# Patient Record
Sex: FEMALE | Race: WHITE | NOT HISPANIC OR LATINO | Employment: OTHER | URBAN - METROPOLITAN AREA
[De-identification: names, ages, dates, MRNs, and addresses within clinical notes are randomized per-mention and may not be internally consistent; named-entity substitution may affect disease eponyms.]

---

## 2017-01-10 ENCOUNTER — APPOINTMENT (OUTPATIENT)
Dept: OTHER | Facility: IMAGING CENTER | Age: 77
End: 2017-01-10

## 2017-01-10 PROCEDURE — 97530 THERAPEUTIC ACTIVITIES: CPT | Performed by: FAMILY MEDICINE

## 2017-01-25 ENCOUNTER — APPOINTMENT (OUTPATIENT)
Dept: OTHER | Facility: IMAGING CENTER | Age: 77
End: 2017-01-25

## 2017-01-25 PROCEDURE — 97530 THERAPEUTIC ACTIVITIES: CPT | Performed by: FAMILY MEDICINE

## 2017-02-16 ENCOUNTER — APPOINTMENT (OUTPATIENT)
Dept: OTHER | Facility: IMAGING CENTER | Age: 77
End: 2017-02-16

## 2017-02-16 PROCEDURE — 97530 THERAPEUTIC ACTIVITIES: CPT | Performed by: FAMILY MEDICINE

## 2017-03-03 ENCOUNTER — APPOINTMENT (OUTPATIENT)
Dept: OTHER | Facility: IMAGING CENTER | Age: 77
End: 2017-03-03

## 2017-03-03 PROCEDURE — 97530 THERAPEUTIC ACTIVITIES: CPT | Performed by: FAMILY MEDICINE

## 2017-03-17 ENCOUNTER — APPOINTMENT (OUTPATIENT)
Dept: OTHER | Facility: IMAGING CENTER | Age: 77
End: 2017-03-17

## 2017-03-17 PROCEDURE — 97530 THERAPEUTIC ACTIVITIES: CPT | Performed by: FAMILY MEDICINE

## 2017-08-18 ENCOUNTER — HOSPITAL ENCOUNTER (OUTPATIENT)
Dept: LAB | Facility: MEDICAL CENTER | Age: 77
End: 2017-08-18
Attending: INTERNAL MEDICINE
Payer: MEDICARE

## 2017-08-18 LAB
C3 SERPL-MCNC: 108 MG/DL (ref 87–200)
C4 SERPL-MCNC: 31 MG/DL (ref 19–52)

## 2017-08-18 PROCEDURE — 86160 COMPLEMENT ANTIGEN: CPT

## 2017-08-18 PROCEDURE — 86161 COMPLEMENT/FUNCTION ACTIVITY: CPT

## 2017-08-18 PROCEDURE — 86160 COMPLEMENT ANTIGEN: CPT | Mod: 91

## 2017-08-18 PROCEDURE — 36415 COLL VENOUS BLD VENIPUNCTURE: CPT

## 2017-08-20 LAB
C1INH FUNCTIONAL/C1INH TOTAL MFR SERPL: 99 %
C1INH SERPL-MCNC: 27 MG/DL (ref 21–39)

## 2017-08-28 LAB
MISCELLANEOUS LAB RESULT MISCLAB: NORMAL
MISCELLANEOUS LAB RESULT MISCLAB: NORMAL

## 2017-09-14 ENCOUNTER — APPOINTMENT (OUTPATIENT)
Dept: OTHER | Facility: IMAGING CENTER | Age: 77
End: 2017-09-14

## 2017-09-14 PROCEDURE — 97530 THERAPEUTIC ACTIVITIES: CPT | Performed by: FAMILY MEDICINE

## 2017-09-19 ENCOUNTER — APPOINTMENT (OUTPATIENT)
Dept: OTHER | Facility: IMAGING CENTER | Age: 77
End: 2017-09-19

## 2017-09-19 PROCEDURE — 97530 THERAPEUTIC ACTIVITIES: CPT | Performed by: FAMILY MEDICINE

## 2017-10-03 ENCOUNTER — APPOINTMENT (OUTPATIENT)
Dept: OTHER | Facility: IMAGING CENTER | Age: 77
End: 2017-10-03

## 2017-10-03 PROCEDURE — 97530 THERAPEUTIC ACTIVITIES: CPT | Performed by: FAMILY MEDICINE

## 2017-10-26 ENCOUNTER — APPOINTMENT (OUTPATIENT)
Dept: OTHER | Facility: IMAGING CENTER | Age: 77
End: 2017-10-26

## 2017-10-26 PROCEDURE — 97530 THERAPEUTIC ACTIVITIES: CPT | Performed by: FAMILY MEDICINE

## 2017-11-09 ENCOUNTER — APPOINTMENT (OUTPATIENT)
Dept: OTHER | Facility: IMAGING CENTER | Age: 77
End: 2017-11-09

## 2017-11-09 PROCEDURE — 97530 THERAPEUTIC ACTIVITIES: CPT | Performed by: FAMILY MEDICINE

## 2017-11-28 ENCOUNTER — APPOINTMENT (OUTPATIENT)
Dept: OTHER | Facility: IMAGING CENTER | Age: 77
End: 2017-11-28

## 2017-11-28 PROCEDURE — 97530 THERAPEUTIC ACTIVITIES: CPT | Performed by: FAMILY MEDICINE

## 2018-02-02 ENCOUNTER — APPOINTMENT (OUTPATIENT)
Dept: OTHER | Facility: IMAGING CENTER | Age: 78
End: 2018-02-02

## 2018-02-09 ENCOUNTER — APPOINTMENT (OUTPATIENT)
Dept: OTHER | Facility: IMAGING CENTER | Age: 78
End: 2018-02-09

## 2018-02-21 ENCOUNTER — APPOINTMENT (OUTPATIENT)
Dept: OTHER | Facility: IMAGING CENTER | Age: 78
End: 2018-02-21

## 2018-03-27 ENCOUNTER — APPOINTMENT (OUTPATIENT)
Dept: OTHER | Facility: IMAGING CENTER | Age: 78
End: 2018-03-27

## 2018-04-04 ENCOUNTER — APPOINTMENT (OUTPATIENT)
Dept: OTHER | Facility: IMAGING CENTER | Age: 78
End: 2018-04-04

## 2018-04-23 ENCOUNTER — APPOINTMENT (OUTPATIENT)
Dept: OTHER | Facility: IMAGING CENTER | Age: 78
End: 2018-04-23

## 2018-05-07 ENCOUNTER — APPOINTMENT (OUTPATIENT)
Dept: OTHER | Facility: IMAGING CENTER | Age: 78
End: 2018-05-07

## 2018-10-04 ENCOUNTER — APPOINTMENT (OUTPATIENT)
Dept: OTHER | Facility: IMAGING CENTER | Age: 78
End: 2018-10-04

## 2018-10-09 ENCOUNTER — HOSPITAL ENCOUNTER (OUTPATIENT)
Dept: LAB | Facility: MEDICAL CENTER | Age: 78
End: 2018-10-09
Attending: ORTHOPAEDIC SURGERY
Payer: MEDICARE

## 2018-11-28 ENCOUNTER — HOSPITAL ENCOUNTER (OUTPATIENT)
Facility: MEDICAL CENTER | Age: 78
End: 2018-11-28
Attending: ORTHOPAEDIC SURGERY
Payer: MEDICARE

## 2018-11-28 DIAGNOSIS — E83.49 OTHER DISORDERS OF MAGNESIUM METABOLISM: ICD-10-CM

## 2018-11-28 DIAGNOSIS — M81.0 OSTEOPOROSIS, POST-MENOPAUSAL: ICD-10-CM

## 2018-11-28 DIAGNOSIS — Z51.81 MEDICATION MONITORING ENCOUNTER: ICD-10-CM

## 2018-11-28 PROCEDURE — 83735 ASSAY OF MAGNESIUM: CPT

## 2018-11-28 PROCEDURE — 82340 ASSAY OF CALCIUM IN URINE: CPT

## 2018-11-30 LAB
CALCIUM 24H UR-MCNC: 9.3 MG/DL
CALCIUM 24H UR-MRATE: 184 MG/D
CALCIUM/CREAT 24H UR: 266 MG/G (ref 20–300)
COLLECT DURATION TIME SPEC: 24 HRS
CREAT 24H UR-MCNC: 35 MG/DL
CREAT 24H UR-MRATE: 691 MG/D (ref 500–1400)
MAGNESIUM 24H UR-MCNC: 5 MG/DL
MAGNESIUM 24H UR-MRATE: 99 MG/D (ref 12–199)
SPECIMEN VOL ?TM UR: 1975 ML

## 2019-02-25 ENCOUNTER — HOSPITAL ENCOUNTER (OUTPATIENT)
Dept: LAB | Facility: MEDICAL CENTER | Age: 79
End: 2019-02-25
Attending: INTERNAL MEDICINE
Payer: MEDICARE

## 2019-02-25 LAB — URATE SERPL-MCNC: 4.1 MG/DL (ref 1.9–8.2)

## 2019-02-25 PROCEDURE — 84550 ASSAY OF BLOOD/URIC ACID: CPT

## 2019-02-25 PROCEDURE — 36415 COLL VENOUS BLD VENIPUNCTURE: CPT

## 2019-11-08 ENCOUNTER — APPOINTMENT (OUTPATIENT)
Dept: MEDICAL GROUP | Facility: IMAGING CENTER | Age: 79
End: 2019-11-08

## 2019-11-11 ENCOUNTER — APPOINTMENT (OUTPATIENT)
Dept: MEDICAL GROUP | Facility: IMAGING CENTER | Age: 79
End: 2019-11-11

## 2019-11-14 ENCOUNTER — APPOINTMENT (OUTPATIENT)
Dept: MEDICAL GROUP | Facility: IMAGING CENTER | Age: 79
End: 2019-11-14

## 2019-11-18 ENCOUNTER — APPOINTMENT (OUTPATIENT)
Dept: MEDICAL GROUP | Facility: IMAGING CENTER | Age: 79
End: 2019-11-18

## 2019-11-21 ENCOUNTER — APPOINTMENT (OUTPATIENT)
Dept: MEDICAL GROUP | Facility: IMAGING CENTER | Age: 79
End: 2019-11-21

## 2019-11-27 ENCOUNTER — APPOINTMENT (OUTPATIENT)
Dept: MEDICAL GROUP | Facility: IMAGING CENTER | Age: 79
End: 2019-11-27

## 2020-01-03 ENCOUNTER — APPOINTMENT (OUTPATIENT)
Dept: MEDICAL GROUP | Facility: IMAGING CENTER | Age: 80
End: 2020-01-03

## 2020-01-09 ENCOUNTER — APPOINTMENT (OUTPATIENT)
Dept: MEDICAL GROUP | Facility: IMAGING CENTER | Age: 80
End: 2020-01-09

## 2020-07-26 ENCOUNTER — APPOINTMENT (OUTPATIENT)
Dept: RADIOLOGY | Facility: MEDICAL CENTER | Age: 80
End: 2020-07-26
Attending: EMERGENCY MEDICINE
Payer: MEDICARE

## 2020-07-26 ENCOUNTER — HOSPITAL ENCOUNTER (EMERGENCY)
Facility: MEDICAL CENTER | Age: 80
End: 2020-07-26
Attending: EMERGENCY MEDICINE
Payer: MEDICARE

## 2020-07-26 VITALS
RESPIRATION RATE: 18 BRPM | WEIGHT: 114.64 LBS | TEMPERATURE: 99.1 F | BODY MASS INDEX: 20.31 KG/M2 | DIASTOLIC BLOOD PRESSURE: 77 MMHG | HEIGHT: 63 IN | HEART RATE: 69 BPM | SYSTOLIC BLOOD PRESSURE: 135 MMHG | OXYGEN SATURATION: 94 %

## 2020-07-26 DIAGNOSIS — R53.1 WEAKNESS: ICD-10-CM

## 2020-07-26 LAB
ALBUMIN SERPL BCP-MCNC: 3.7 G/DL (ref 3.2–4.9)
ALBUMIN/GLOB SERPL: 1.4 G/DL
ALP SERPL-CCNC: 56 U/L (ref 30–99)
ALT SERPL-CCNC: 13 U/L (ref 2–50)
ANION GAP SERPL CALC-SCNC: 10 MMOL/L (ref 7–16)
APPEARANCE UR: CLEAR
AST SERPL-CCNC: 20 U/L (ref 12–45)
BASOPHILS # BLD AUTO: 0.3 % (ref 0–1.8)
BASOPHILS # BLD: 0.03 K/UL (ref 0–0.12)
BILIRUB SERPL-MCNC: 0.6 MG/DL (ref 0.1–1.5)
BILIRUB UR QL STRIP.AUTO: NEGATIVE
BUN SERPL-MCNC: 18 MG/DL (ref 8–22)
CALCIUM SERPL-MCNC: 8.9 MG/DL (ref 8.4–10.2)
CHLORIDE SERPL-SCNC: 105 MMOL/L (ref 96–112)
CO2 SERPL-SCNC: 24 MMOL/L (ref 20–33)
COLOR UR: YELLOW
COVID ORDER STATUS COVID19: NORMAL
CREAT SERPL-MCNC: 0.67 MG/DL (ref 0.5–1.4)
DIGOXIN SERPL-MCNC: 1.1 NG/ML (ref 0.8–2)
EKG IMPRESSION: NORMAL
EOSINOPHIL # BLD AUTO: 0.02 K/UL (ref 0–0.51)
EOSINOPHIL NFR BLD: 0.2 % (ref 0–6.9)
ERYTHROCYTE [DISTWIDTH] IN BLOOD BY AUTOMATED COUNT: 39.2 FL (ref 35.9–50)
GLOBULIN SER CALC-MCNC: 2.7 G/DL (ref 1.9–3.5)
GLUCOSE SERPL-MCNC: 105 MG/DL (ref 65–99)
GLUCOSE UR STRIP.AUTO-MCNC: NEGATIVE MG/DL
HCT VFR BLD AUTO: 42.4 % (ref 37–47)
HGB BLD-MCNC: 14.3 G/DL (ref 12–16)
IMM GRANULOCYTES # BLD AUTO: 0.03 K/UL (ref 0–0.11)
IMM GRANULOCYTES NFR BLD AUTO: 0.3 % (ref 0–0.9)
KETONES UR STRIP.AUTO-MCNC: NEGATIVE MG/DL
LEUKOCYTE ESTERASE UR QL STRIP.AUTO: NEGATIVE
LYMPHOCYTES # BLD AUTO: 0.86 K/UL (ref 1–4.8)
LYMPHOCYTES NFR BLD: 8.6 % (ref 22–41)
MCH RBC QN AUTO: 31.2 PG (ref 27–33)
MCHC RBC AUTO-ENTMCNC: 33.7 G/DL (ref 33.6–35)
MCV RBC AUTO: 92.4 FL (ref 81.4–97.8)
MICRO URNS: NORMAL
MONOCYTES # BLD AUTO: 0.89 K/UL (ref 0–0.85)
MONOCYTES NFR BLD AUTO: 8.9 % (ref 0–13.4)
NEUTROPHILS # BLD AUTO: 8.12 K/UL (ref 2–7.15)
NEUTROPHILS NFR BLD: 81.7 % (ref 44–72)
NITRITE UR QL STRIP.AUTO: NEGATIVE
NRBC # BLD AUTO: 0 K/UL
NRBC BLD-RTO: 0 /100 WBC
PH UR STRIP.AUTO: 6.5 [PH] (ref 5–8)
PLATELET # BLD AUTO: 214 K/UL (ref 164–446)
PMV BLD AUTO: 10.4 FL (ref 9–12.9)
POTASSIUM SERPL-SCNC: 4.7 MMOL/L (ref 3.6–5.5)
PROT SERPL-MCNC: 6.4 G/DL (ref 6–8.2)
PROT UR QL STRIP: NEGATIVE MG/DL
RBC # BLD AUTO: 4.59 M/UL (ref 4.2–5.4)
RBC UR QL AUTO: NEGATIVE
SODIUM SERPL-SCNC: 139 MMOL/L (ref 135–145)
SP GR UR STRIP.AUTO: 1.01
T4 FREE SERPL-MCNC: 1.21 NG/DL (ref 0.93–1.7)
TROPONIN T SERPL-MCNC: <6 NG/L (ref 6–19)
TSH SERPL DL<=0.005 MIU/L-ACNC: 0.77 UIU/ML (ref 0.38–5.33)
WBC # BLD AUTO: 10 K/UL (ref 4.8–10.8)

## 2020-07-26 PROCEDURE — 84484 ASSAY OF TROPONIN QUANT: CPT

## 2020-07-26 PROCEDURE — C9803 HOPD COVID-19 SPEC COLLECT: HCPCS | Performed by: EMERGENCY MEDICINE

## 2020-07-26 PROCEDURE — 85025 COMPLETE CBC W/AUTO DIFF WBC: CPT

## 2020-07-26 PROCEDURE — 93005 ELECTROCARDIOGRAM TRACING: CPT | Performed by: EMERGENCY MEDICINE

## 2020-07-26 PROCEDURE — 84443 ASSAY THYROID STIM HORMONE: CPT

## 2020-07-26 PROCEDURE — U0003 INFECTIOUS AGENT DETECTION BY NUCLEIC ACID (DNA OR RNA); SEVERE ACUTE RESPIRATORY SYNDROME CORONAVIRUS 2 (SARS-COV-2) (CORONAVIRUS DISEASE [COVID-19]), AMPLIFIED PROBE TECHNIQUE, MAKING USE OF HIGH THROUGHPUT TECHNOLOGIES AS DESCRIBED BY CMS-2020-01-R: HCPCS

## 2020-07-26 PROCEDURE — 36415 COLL VENOUS BLD VENIPUNCTURE: CPT

## 2020-07-26 PROCEDURE — 71045 X-RAY EXAM CHEST 1 VIEW: CPT

## 2020-07-26 PROCEDURE — 84439 ASSAY OF FREE THYROXINE: CPT

## 2020-07-26 PROCEDURE — 80162 ASSAY OF DIGOXIN TOTAL: CPT

## 2020-07-26 PROCEDURE — 81003 URINALYSIS AUTO W/O SCOPE: CPT

## 2020-07-26 PROCEDURE — 99284 EMERGENCY DEPT VISIT MOD MDM: CPT

## 2020-07-26 PROCEDURE — 80053 COMPREHEN METABOLIC PANEL: CPT

## 2020-07-26 RX ORDER — DILTIAZEM HYDROCHLORIDE 120 MG/1
120 CAPSULE, COATED, EXTENDED RELEASE ORAL EVERY EVENING
Status: SHIPPED | COMMUNITY
End: 2022-03-18

## 2020-07-26 RX ORDER — MESALAMINE 1.2 G/1
1.2 TABLET, DELAYED RELEASE ORAL
Status: ON HOLD | COMMUNITY
End: 2024-03-11

## 2020-07-26 RX ORDER — ALENDRONATE SODIUM 70 MG/1
70 TABLET ORAL
Status: SHIPPED | COMMUNITY
End: 2023-10-23

## 2020-07-26 RX ORDER — FOLIC ACID 0.8 MG
500 TABLET ORAL DAILY
Status: SHIPPED | COMMUNITY
End: 2021-11-22

## 2020-07-26 RX ORDER — CYANOCOBALAMIN (VITAMIN B-12) 1000 MCG
1000 TABLET ORAL DAILY
Status: ON HOLD | COMMUNITY
End: 2024-03-11

## 2020-07-26 RX ORDER — DIGOXIN 125 MCG
125 TABLET ORAL DAILY
Status: SHIPPED | COMMUNITY
End: 2022-03-18

## 2020-07-26 NOTE — ED NOTES
Med rec updated and complete  Allergies reviewed  Interviewed pt with  at bedside with permission from pt.  Pts  had a list of medications at bedside, went over list of medications and returned list of medications back to pts  at bedside.  Pt reports no antibiotics in the last 2 weeks

## 2020-07-26 NOTE — ED PROVIDER NOTES
ED Provider Note    CHIEF COMPLAINT  Chief Complaint   Patient presents with   • Tired     woke up today feeling not well    • Weakness     started today   • Chills     this afternoon    • Congestion     can feel it in her throat       HPI  Indy Patel is a 79 y.o. female who presents with generalized feelings of not feeling well.  She states that she woke up tired and not wanting to walk her dog which is unusual for her.  Has had a felling of generalized weakness.  She notes that she has a sore throat however reports that she had a recent endoscopy procedure for esophageal strictures.  This was done last Thursday, just a few days ago.  She has been able to swallow/eat/drink without difficulty following the procedure.  She does note negative COVID testing preoperatively.  No known fevers but has chills. No chest pain or trouble breathing.  No rashes or skin changes. No nausea, vomiting, diarrhea.  No dysuria or hematuria.  She notes starting digoxin for the first time a few weeks ago and changing dose of diltiazem by cardiology.      REVIEW OF SYSTEMS  See HPI for further details. All other systems are negative.     PAST MEDICAL HISTORY   has a past medical history of CATARACT, Colonic polyps, GERD (gastroesophageal reflux disease), Glucose intolerance, Heart burn, History of fundoplication (2001), Indigestion, IPMN (intraductal papillary mucinous neoplasm) (2007), Osteopenia, Other specified disorder of intestines, Pancreatitis chronic, Personal history of venous thrombosis and embolism (2001, 1991), Reactive hypoglycemia, S/P appendectomy (1991), S/P cholecystectomy (2004), S/P hysterectomy with oophorectomy (1990), and Superficial phlebitis of leg.    SOCIAL HISTORY  Social History     Tobacco Use   • Smoking status: Never Smoker   • Smokeless tobacco: Never Used   Substance and Sexual Activity   • Alcohol use: No     Comment: once a month   • Drug use: No   • Sexual activity: Not on file       SURGICAL  "HISTORY   has a past surgical history that includes gastroscopy with biopsy (4/25/08); egd w/endoscopic ultrasound (4/25/08); other orthopedic surgery; other abdominal surgery; gyn surgery; appendectomy (1991); egd w/endoscopic ultrasound (10/21/2009); gastroscopy with biopsy (10/21/2009); gastroscopy with biopsy (11/18/2010); egd w/endoscopic ultrasound (11/18/2010); cataract extraction with iol (11/2010); nissen fundoplication laparoscopic (2002); nissen fundoplication laparoscopic (2003); ercp w/sphincterotomy/papill. (6/7/2011); gastroscopy with biopsy (1/23/2012); and egd w/endoscopic ultrasound (1/23/2012).    CURRENT MEDICATIONS  Home Medications    **Home medications have not yet been reviewed for this encounter**         ALLERGIES  Allergies   Allergen Reactions   • Boniva [Ibandronate Sodium] Unspecified     Severe pain   • Ciprofloxacin    • Fosamax [Alendronate Sodium]    • Prolia [Denosumab]    • Reglan [Metoclopramide Hcl]    • Vicodin [Hydrocodone-Acetaminophen]        PHYSICAL EXAM  VITAL SIGNS: /78   Pulse 72   Temp 37.3 °C (99.1 °F) (Temporal)   Resp 18   Ht 1.6 m (5' 3\")   Wt 52 kg (114 lb 10.2 oz)   SpO2 94%   BMI 20.31 kg/m²   Pulse ox interpretation: I interpret this pulse ox as normal.  Constitutional: Alert in no apparent distress.  HENT: No signs of trauma, Bilateral external ears normal, Nose normal.   Eyes: Pupils are equal and reactive, Conjunctiva normal, Non-icteric.   Neck: Normal range of motion, No tenderness, Supple, No stridor.    Cardiovascular: Regular rate and rhythm.   Thorax & Lungs: Normal breath sounds, No respiratory distress, No wheezing, No chest tenderness.   Abdomen: Bowel sounds normal, Soft, No tenderness, No masses, No pulsatile masses. No peritoneal signs.  Skin: Warm, Dry, No erythema, No rash.   Extremities: Intact distal pulses, No edema, No tenderness, No cyanosis  Musculoskeletal: Good range of motion in all major joints. No tenderness to palpation " or major deformities noted.   Neurologic: Alert, Normal motor function and gait, Normal sensory function, No focal deficits noted.       DIAGNOSTIC STUDIES / PROCEDURES    EKG - Physician interpretation  Results for orders placed or performed during the hospital encounter of 20   EKG   Result Value Ref Range    Report       [facility] Emergency Dept.    Test Date:  2020  Pt Name:    EVER ROYAL               Department: [facility]  MRN:        [id number]                      [facility]  Gender:     Female                       Technician: GT  :        [id number]                   Requested By:ESPERANZA CARVAJAL  Order #:    [id number]                    Reading MD: ESPERANZA CARVAJAL MD    Measurements  Intervals                                Axis  Rate:       64                           P:          42  ME:         152                          QRS:        -48  QRSD:       176                          T:          44  QT:         412  QTc:        425    Interpretive Statements  SINUS RHYTHM  NONSPECIFIC IVCD WITH LAD  LEFT VENTRICULAR HYPERTROPHY  Compared to ECG 10/01/2013 04:15:38  Intraventricular conduction delay now present  Left ventricular hypertrophy now present  Left-axis deviation no longer present  Electronically Signed On 2020 17:19:20 PDT by ESPERANZA CARVAJAL MD           LABS  Labs Reviewed   CBC WITH DIFFERENTIAL - Abnormal; Notable for the following components:       Result Value    Neutrophils-Polys 81.70 (*)     Lymphocytes 8.60 (*)     Neutrophils (Absolute) 8.12 (*)     Lymphs (Absolute) 0.86 (*)     Monos (Absolute) 0.89 (*)     All other components within normal limits   COMP METABOLIC PANEL - Abnormal; Notable for the following components:    Glucose 105 (*)     All other components within normal limits   TROPONIN   URINALYSIS,CULTURE IF INDICATED   DIGOXIN   TSH   FREE THYROXINE   ESTIMATED GFR   COVID/SARS COV-2    Narrative:     Is patient being  admitted?->No  Expected turn around time?->Routine (In-House PCR up to 24  hours)   SARS-COV-2, PCR (IN-HOUSE)    Narrative:     Is patient being admitted?->No  Expected turn around time?->Routine (In-House PCR up to 24  hours)         RADIOLOGY  DX-CHEST-PORTABLE (1 VIEW)   Final Result      1.  There is no acute cardiopulmonary process.            COURSE & MEDICAL DECISION MAKING    Medications - No data to display    Pertinent Labs & Imaging studies reviewed. (See chart for details)  79 y.o. female presenting with generalized weakness since this morning.  No chest pain or trouble breathing.  No cough or fever.  Does have chills and a sore throat.  I do suspect however that the patient's sore throat is related to recent endoscopy procedure with balloon dilatation for esophageal strictures.  Patient is tolerating oral intake without difficulty.  No difficulty with speaking or breathing.  Normal voice.    Laboratory studies are unremarkable.  No leukocytosis.  No signs of sepsis or underlying infectious etiology.  Chest x-ray is unremarkable.  No urinary tract infection.  Normal thyroid function.  Digoxin is not supratherapeutic.  Negative troponin.  No signs of acute ischemia or arrhythmia.  All results were reviewed with patient.    No tachycardia.  No hypoxia or respiratory distress.  No chest pain or trouble breathing.  No evidence of anemia or metabolic abnormality to explain the patient's vague symptoms at this time.  Patient appears stable for discharge at this time.    Unclear etiology for patient's vague generalized weakness symptoms.  Has normal vital signs and is in no distress.  Recommending continued outpatient management with a primary care physician and strict return precautions for any worsening of her symptoms or development of any other concerning signs or symptoms.    The patient was instructed to follow-up with primary care physician for further management.  To return immediately for any  "worsening symptoms or development of any other concerning signs or symptoms. The patient verbalizes understanding in their own words.    /77   Pulse 69   Temp 37.3 °C (99.1 °F) (Temporal)   Resp 18   Ht 1.6 m (5' 3\")   Wt 52 kg (114 lb 10.2 oz)   SpO2 94%   BMI 20.31 kg/m²     The patient was referred to primary care where they will receive further BP management.      J Timothy Lombard, M.D.  [address] [facility] [id number] [phone]    Schedule an appointment as soon as possible for a visit       [facility], Emergency Dept  [address]  [facility] [address] [phone]    As needed, If symptoms worsen      FINAL IMPRESSION  1. Weakness            Electronically signed by: Keaton Lang M.D., 7/26/2020 3:57 PM    "

## 2020-07-26 NOTE — ED TRIAGE NOTES
Pt comes in c/o not feeling well today   Has congestion in there throat  Fever and chills, weakness and slight cough  Felt fine yesterday  Unable to do her usual things today due to not feeling well   Per pt her normal temp 96.8  Today its 99.1

## 2020-07-27 LAB
SARS-COV-2 RNA RESP QL NAA+PROBE: NOTDETECTED
SPECIMEN SOURCE: NORMAL

## 2020-07-27 NOTE — ED NOTES
Patient verbalized understanding to plan of care and discharge information, including information on how to care for COVID. Pt understands when and if to return to ER. Patient in stable condition. No signs of distress. Patient pain free. Patient ambulatory out of ED to personal vehicle with stable gait with family.

## 2020-07-27 NOTE — ED NOTES
Patient swabbed for covid and sent to lab. Patient reports light sensitivity. Lights turned down for comfort. Patient resting in bed. Patient occasionally self adjusts in bed. Bed is at lowest position and locked. Call light and preferred belongings in reach. Will continue to monitor.

## 2021-01-07 ENCOUNTER — APPOINTMENT (OUTPATIENT)
Dept: MEDICAL GROUP | Facility: IMAGING CENTER | Age: 81
End: 2021-01-07

## 2021-01-12 ENCOUNTER — APPOINTMENT (OUTPATIENT)
Dept: MEDICAL GROUP | Facility: IMAGING CENTER | Age: 81
End: 2021-01-12

## 2021-01-12 DIAGNOSIS — Z23 NEED FOR VACCINATION: ICD-10-CM

## 2021-01-15 ENCOUNTER — IMMUNIZATION (OUTPATIENT)
Dept: FAMILY PLANNING/WOMEN'S HEALTH CLINIC | Facility: IMMUNIZATION CENTER | Age: 81
End: 2021-01-15
Payer: MEDICARE

## 2021-01-15 ENCOUNTER — APPOINTMENT (OUTPATIENT)
Dept: MEDICAL GROUP | Facility: IMAGING CENTER | Age: 81
End: 2021-01-15

## 2021-01-15 DIAGNOSIS — Z23 ENCOUNTER FOR VACCINATION: Primary | ICD-10-CM

## 2021-01-15 PROCEDURE — 0001A PFIZER SARS-COV-2 VACCINE: CPT

## 2021-01-15 PROCEDURE — 91300 PFIZER SARS-COV-2 VACCINE: CPT

## 2021-01-18 ENCOUNTER — APPOINTMENT (OUTPATIENT)
Dept: MEDICAL GROUP | Facility: IMAGING CENTER | Age: 81
End: 2021-01-18

## 2021-02-04 ENCOUNTER — IMMUNIZATION (OUTPATIENT)
Dept: FAMILY PLANNING/WOMEN'S HEALTH CLINIC | Facility: IMMUNIZATION CENTER | Age: 81
End: 2021-02-04
Attending: INTERNAL MEDICINE
Payer: MEDICARE

## 2021-02-04 DIAGNOSIS — Z23 ENCOUNTER FOR VACCINATION: Primary | ICD-10-CM

## 2021-02-04 PROCEDURE — 0002A PFIZER SARS-COV-2 VACCINE: CPT

## 2021-02-04 PROCEDURE — 91300 PFIZER SARS-COV-2 VACCINE: CPT

## 2021-02-09 ENCOUNTER — APPOINTMENT (OUTPATIENT)
Dept: MEDICAL GROUP | Facility: IMAGING CENTER | Age: 81
End: 2021-02-09
Payer: MEDICARE

## 2021-02-11 ENCOUNTER — APPOINTMENT (OUTPATIENT)
Dept: MEDICAL GROUP | Facility: IMAGING CENTER | Age: 81
End: 2021-02-11

## 2021-02-18 ENCOUNTER — APPOINTMENT (OUTPATIENT)
Dept: MEDICAL GROUP | Facility: IMAGING CENTER | Age: 81
End: 2021-02-18

## 2021-02-19 ENCOUNTER — APPOINTMENT (OUTPATIENT)
Dept: MEDICAL GROUP | Facility: IMAGING CENTER | Age: 81
End: 2021-02-19

## 2021-02-22 ENCOUNTER — APPOINTMENT (OUTPATIENT)
Dept: MEDICAL GROUP | Facility: IMAGING CENTER | Age: 81
End: 2021-02-22

## 2021-02-25 ENCOUNTER — APPOINTMENT (OUTPATIENT)
Dept: MEDICAL GROUP | Facility: IMAGING CENTER | Age: 81
End: 2021-02-25

## 2021-03-04 ENCOUNTER — APPOINTMENT (OUTPATIENT)
Dept: MEDICAL GROUP | Facility: IMAGING CENTER | Age: 81
End: 2021-03-04

## 2021-03-26 ENCOUNTER — APPOINTMENT (OUTPATIENT)
Dept: MEDICAL GROUP | Facility: IMAGING CENTER | Age: 81
End: 2021-03-26

## 2021-04-12 ENCOUNTER — APPOINTMENT (OUTPATIENT)
Dept: MEDICAL GROUP | Facility: IMAGING CENTER | Age: 81
End: 2021-04-12

## 2021-04-16 ENCOUNTER — TELEPHONE (OUTPATIENT)
Dept: OPHTHALMOLOGY | Facility: MEDICAL CENTER | Age: 81
End: 2021-04-16

## 2021-06-03 ENCOUNTER — OFFICE VISIT (OUTPATIENT)
Dept: OPHTHALMOLOGY | Facility: MEDICAL CENTER | Age: 81
End: 2021-06-03
Payer: MEDICARE

## 2021-06-03 DIAGNOSIS — G45.9 TIA (TRANSIENT ISCHEMIC ATTACK): ICD-10-CM

## 2021-06-03 DIAGNOSIS — H53.9 TRANSIENT VISION DISTURBANCE OF BOTH EYES: ICD-10-CM

## 2021-06-03 DIAGNOSIS — Z96.1 PSEUDOPHAKIA OF BOTH EYES: ICD-10-CM

## 2021-06-03 PROCEDURE — 92250 FUNDUS PHOTOGRAPHY W/I&R: CPT | Performed by: OPHTHALMOLOGY

## 2021-06-03 PROCEDURE — 99205 OFFICE O/P NEW HI 60 MIN: CPT | Mod: 25 | Performed by: OPHTHALMOLOGY

## 2021-06-03 PROCEDURE — 92083 EXTENDED VISUAL FIELD XM: CPT | Performed by: OPHTHALMOLOGY

## 2021-06-03 RX ORDER — VERAPAMIL HYDROCHLORIDE 120 MG/1
120 TABLET, FILM COATED ORAL 2 TIMES DAILY
COMMUNITY
End: 2023-10-23

## 2021-06-03 ASSESSMENT — VISUAL ACUITY
OS_CC: J1+
OS_SC: 20/20
OD_CC: J1+
METHOD: SNELLEN - LINEAR
OD_SC: 20/20-2

## 2021-06-03 ASSESSMENT — REFRACTION
OD_AXIS: 179
OS_SPHERE: +0.25
OD_CYLINDER: +0.75
OD_SPHERE: -0.75
OS_CYLINDER: +0.25
OS_AXIS: 175

## 2021-06-03 ASSESSMENT — CUP TO DISC RATIO
OS_RATIO: 0.2
OD_RATIO: 0.3

## 2021-06-03 ASSESSMENT — EXTERNAL EXAM - RIGHT EYE: OD_EXAM: NORMAL

## 2021-06-03 ASSESSMENT — REFRACTION_WEARINGRX
OS_SPHERE: +2.50
SPECS_TYPE: SVL
OS_CYLINDER: SPHERE
OD_SPHERE: +2.50
OD_CYLINDER: SPHERE

## 2021-06-03 ASSESSMENT — REFRACTION_MANIFEST
OS_CYLINDER: +0.50
OD_CYLINDER: +0.75
OS_SPHERE: +0.25
OS_AXIS: 173
METHOD_AUTOREFRACTION: 1
OD_AXIS: 001
OD_SPHERE: -0.75

## 2021-06-03 ASSESSMENT — TONOMETRY
OD_IOP_MMHG: 15
OS_IOP_MMHG: 18

## 2021-06-03 ASSESSMENT — EXTERNAL EXAM - LEFT EYE: OS_EXAM: NORMAL

## 2021-06-03 ASSESSMENT — CONF VISUAL FIELD
OS_NORMAL: 1
OD_NORMAL: 1

## 2021-06-03 ASSESSMENT — SLIT LAMP EXAM - LIDS
COMMENTS: NORMAL
COMMENTS: NORMAL

## 2021-06-03 NOTE — PROCEDURES
Discs sharp, no segmental atrophy. Artifact seen off of [employer] OS, most david shadow from PVD

## 2021-06-03 NOTE — PROGRESS NOTES
"Peds/Neuro Ophthalmology:   Zachary Spangler M.D.    Date & Time note created:    6/3/2021   10:55 AM     Referring MD / APRN:  J Timothy Lombard, M.D., No att. providers found    Patient ID:  Name:             Indy Patel   YOB: 1940  Age:                 80 y.o.  female   MRN:               [id number]    Chief Complaint/Reason for Visit:     Other (spots in vision)      History of Present Illness:    Indy Patel is a 80 y.o. female   Pt referred for TIA but C/O black spots with orange rings lasting a few seconds a couple of months ago but gone now.Pt thought she was having hypoglycemia attack.No headaches or double vision.Pt feels vision really good near and far and only wears glasses to read. Few months ago saw solid black circles with orange surround lasted a few seconds. Three episodes. Three episodes was in both eyes. Lasted a few seconds. First two times walking dog then came into the house. Third time was out in the backyard. The third episode felt was hypoglycemic. Gets weak and hungry and cant think. Takes verapamil, takes digoxin. Three episode of Afib, heart beating fast with pressure in the neck. Last episode about a month ago. Takes 81 mg ASA now for two months. Dr Lombard in [place] cardiologist and primary care. Last two week test saw episodes and last was 13 seconds. No scans of the head.       Review of Systems:  Review of Systems   Eyes:        Spots in vision   All other systems reviewed and are negative.      Past Medical History:   Past Medical History:   Diagnosis Date   • CATARACT     surgically corrected left   • Colonic polyps    • GERD (gastroesophageal reflux disease)    • Glucose intolerance     \"reactive hypoglycemia\"   • Heart burn     gerd   • Heart murmur    • History of fundoplication 2001   • Indigestion    • IPMN (intraductal papillary mucinous neoplasm) 2007    dx @ [facility] / Dr. Palmer doubts   • Osteopenia    • Other specified " disorder of intestines     diarrhea, steatorrhea   • Pancreatitis chronic     ?   • Personal history of venous thrombosis and embolism 2001, 1991    leg   • Reactive hypoglycemia    • S/P appendectomy 1991   • S/P cholecystectomy 2004   • S/P hysterectomy with oophorectomy 1990   • Superficial phlebitis of leg        Past Surgical History:  Past Surgical History:   Procedure Laterality Date   • GASTROSCOPY WITH BIOPSY  1/23/2012    Performed by [facility] K[facility] at [facility]   • EGD W/ENDOSCOPIC ULTRASOUND  1/23/2012    Performed by KIM KIM at [facility]   • ERCP W/SPHINCTEROTOMY/PAPILL.  6/7/2011    Performed by KIM KIM at [facility]   • GASTROSCOPY WITH BIOPSY  11/18/2010    Performed by KIM KIM at [facility]   • EGD W/ENDOSCOPIC ULTRASOUND  11/18/2010    Performed by KIM KIM at [facility]   • CATARACT EXTRACTION WITH IOL  11/2010    left   • EGD W/ENDOSCOPIC ULTRASOUND  10/21/2009    Performed by KIM KIM at [facility]   • GASTROSCOPY WITH BIOPSY  10/21/2009    Performed by KIM KIM at [facility]   • GASTROSCOPY WITH BIOPSY  4/25/08    Performed by KIM KIM at [facility]   • EGD W/ENDOSCOPIC ULTRASOUND  4/25/08    Performed by KIM KIM at [facility]   • NISSEN FUNDOPLICATION LAPAROSCOPIC  2003    redo   • NISSEN FUNDOPLICATION LAPAROSCOPIC  2002   • APPENDECTOMY  1991   • GYN SURGERY      hysterectomy, bilateral salpingo-oophorectomies   • OTHER ABDOMINAL SURGERY      cholecystectomy, Nissen fundoplication with Toupet reoperation, appendectomy   • OTHER ORTHOPEDIC SURGERY      ankle surgery x3       Current Outpatient Medications:  Current Outpatient Medications   Medication Sig Dispense Refill   • verapamil (ISOPTIN) 120 MG Tab Take 120 mg by mouth 3 times a day.     • alendronate (FOSAMAX) 70 MG Tab Take 70 mg by mouth every 7  days. On Monday     • digoxin (LANOXIN) 125 MCG Tab Take 125 mcg by mouth every day.     • mesalamine (LIALDA) 1.2 GM Tablet Delayed Response Take 1.2 g by mouth every morning with breakfast.     • Cholecalciferol (VITAMIN D3) 125 MCG (5000 UT) Cap Take 5,000 Units by mouth every day. (Patient not taking: Reported on 6/3/2021)     • Magnesium 500 MG Cap Take 500 mg by mouth every day. (Patient not taking: Reported on 6/3/2021)     • Vitamin E 180 MG Cap Take 180 mg by mouth every day. (Patient not taking: Reported on 6/3/2021)     • DILTIAZem CD (CARDIZEM CD) 120 MG CAPSULE SR 24 HR Take 120 mg by mouth every evening. (Patient not taking: Reported on 6/3/2021)     • Cyanocobalamin (B-12) 1000 MCG Tab Take 1,000 mcg by mouth every day. (Patient not taking: Reported on 6/3/2021)     • Probiotic Product (PROBIOTIC DAILY PO) Take 1 Cap by mouth every day. (Patient not taking: Reported on 6/3/2021)       No current facility-administered medications for this visit.       Allergies:  Allergies   Allergen Reactions   • Boniva [Ibandronate Sodium] Unspecified     Severe pain   • Ciprofloxacin Hives   • Prolia [Denosumab]      Severe pain   • Reglan [Metoclopramide Hcl] Shortness of Breath   • Vicodin [Hydrocodone-Acetaminophen] Vomiting and Nausea       Family History:  Family History   Problem Relation Age of Onset   • Diabetes Other    • Heart Disease Other    • Hypertension Other    • Cancer Other    • Glasses Mother    • Glasses Father    • Heart Disease Father        Social History:  Social History     Socioeconomic History   • Marital status:      Spouse name: Not on file   • Number of children: Not on file   • Years of education: Not on file   • Highest education level: Not on file   Occupational History   • Not on file   Tobacco Use   • Smoking status: Never Smoker   • Smokeless tobacco: Never Used   Substance and Sexual Activity   • Alcohol use: No     Comment: once a month   • Drug use: No   • Sexual  activity: Not on file   Other Topics Concern   • Not on file   Social History Narrative    retired     Social Determinants of Health     Financial Resource Strain:    • Difficulty of Paying Living Expenses:    Food Insecurity:    • Worried About Running Out of Food in the Last Year:    • Ran Out of Food in the Last Year:    Transportation Needs:    • Lack of Transportation (Medical):    • Lack of Transportation (Non-Medical):    Physical Activity:    • Days of Exercise per Week:    • Minutes of Exercise per Session:    Stress:    • Feeling of Stress :    Social Connections:    • Frequency of Communication with Friends and Family:    • Frequency of Social Gatherings with Friends and Family:    • Attends Buddhist Services:    • Active Member of Clubs or Organizations:    • Attends Club or Organization Meetings:    • Marital Status:    Intimate Partner Violence:    • Fear of Current or Ex-Partner:    • Emotionally Abused:    • Physically Abused:    • Sexually Abused:           Physical Exam:  Physical Exam    Oriented x 3  Weight/BMI: There is no height or weight on file to calculate BMI.  There were no vitals taken for this visit.    Base Eye Exam     Visual Acuity (Snellen - Linear)       Right Left    Dist sc 20/20-2 20/20    Near cc J1+ J1+          Tonometry ( care, 7:48 AM)       Right Left    Pressure 15 18          Pupils       Pupils    Right PERRL    Left PERRL          Visual Fields       Right Left     Full Full          Extraocular Movement       Right Left     Full, Ortho Full, Ortho          Neuro/Psych     Oriented x3: Yes    Mood/Affect: Normal          Dilation     Both eyes: Tropicamide (MYDRIACYL) 1% ophthalmic solution, Phenylephrine (NEOSYNEPHRINE) ophthalmic solution 2.5%, Cyclopentolate (CYCLOGYL) 1% ophthalmic solution @ 10:46 AM            Additional Tests     Color       Right Left    Ishihara 9/9 9/9          Stereo     Fly: +    Animals: 2/3    Circles: 3/9            Slit Lamp and Fundus  Exam     External Exam       Right Left    External Normal Normal          Slit Lamp Exam       Right Left    Lids/Lashes Normal Normal    Conjunctiva/Sclera White and quiet White and quiet    Cornea Clear Clear    Anterior Chamber Deep and quiet Deep and quiet    Iris Round and reactive Round and reactive    Lens Posterior chamber intraocular lens Posterior chamber intraocular lens    Vitreous Normal Normal          Fundus Exam       Right Left    Disc Normal Normal    C/D Ratio 0.3 0.2    Macula Normal Normal    Vessels Normal Normal    Periphery Normal Normal            Refraction     Wearing Rx       Sphere Cylinder    Right +2.50 Sphere    Left +2.50 Sphere    Age: 1yr    Type: SVL          Manifest Refraction (Auto)       Sphere Cylinder Axis    Right -0.75 +0.75 001    Left +0.25 +0.50 173          Cycloplegic Refraction (Auto)       Sphere Cylinder Axis    Right -0.75 +0.75 179    Left +0.25 +0.25 175                Pertinent Lab/Test/Imaging Review:      Assessment and Plan:     Transient vision disturbance of both eyes  6/3/2021 - Episode of transient vision change described as 3 circular black balls with coloration lasting only seconds. One episode associated with hypoglycemia. Although these could be presyncopal episodes, concern would be for a posterior circulation TIA. Has a history of A-Fib, but more recent Holter no significant episodes and according to patient aslo had carotid studies that were unremarkable. Started an ASA per day and no episodes since. Thus to be complete recommend that Dr Lombard precede with Cardiac Echo to rule out valvular disease or right to left shunt. Recommended continuing on the ASA and if episodes return would need to consider MRI/ MRA looking for other changes or posterior circulation disease and refer to stroke neurologist.     Pseudophakia of both eyes  6/3/2021 - IOL in place, no significant PCO    Greater than 60 minute were spent examining the patient, reviewing  records from referring providers including MRI images if available and records within the EPIC system, counselling the patient and family regarding the examination findings, diagnostic testing preformed, recommendations for management, prognosis, further testing and referrals as appropriate and follow up. This in addition to time spent interpreting separate billable tests done during the visit.       Zachary Spangler M.D.

## 2021-06-29 ENCOUNTER — DOCUMENTATION (OUTPATIENT)
Dept: OPHTHALMOLOGY | Facility: MEDICAL CENTER | Age: 81
End: 2021-06-29

## 2021-06-29 DIAGNOSIS — H53.9 TRANSIENT VISION DISTURBANCE OF BOTH EYES: ICD-10-CM

## 2021-06-29 NOTE — ASSESSMENT & PLAN NOTE
6/3/2021 - Episode of transient vision change described as 3 circular black balls with coloration lasting only seconds. One episode associated with hypoglycemia. Although these could be presyncopal episodes, concern would be for a posterior circulation TIA. Has a history of A-Fib, but more recent Holter no significant episodes and according to patient aslo had carotid studies that were unremarkable. Started an ASA per day and no episodes since. Thus to be complete recommend that Dr Lombard precede with Cardiac Echo to rule out valvular disease or right to left shunt. Recommended continuing on the ASA and if episodes return would need to consider MRI/ MRA looking for other changes or posterior circulation disease and refer to stroke neurologist.   6/29/2021 - She called this morning stating that she had been outside, and when she went back into the house, she had an episode with the dark spots, she tested her blood sugar, and it was low (58).  She took 2 glucose tabs and it resolved the issue.  Advised to let her primary care physician know.

## 2021-07-09 ENCOUNTER — OFFICE VISIT (OUTPATIENT)
Dept: MEDICAL GROUP | Facility: IMAGING CENTER | Age: 81
End: 2021-07-09

## 2021-07-20 ENCOUNTER — HOSPITAL ENCOUNTER (EMERGENCY)
Facility: MEDICAL CENTER | Age: 81
End: 2021-07-21
Attending: EMERGENCY MEDICINE
Payer: MEDICARE

## 2021-07-20 DIAGNOSIS — R19.7 DIARRHEA, UNSPECIFIED TYPE: ICD-10-CM

## 2021-07-20 DIAGNOSIS — F43.21 SITUATIONAL DEPRESSION: ICD-10-CM

## 2021-07-20 DIAGNOSIS — R42 VERTIGO: ICD-10-CM

## 2021-07-20 LAB
ALBUMIN SERPL BCP-MCNC: 4 G/DL (ref 3.2–4.9)
ALBUMIN/GLOB SERPL: 1.7 G/DL
ALP SERPL-CCNC: 51 U/L (ref 30–99)
ALT SERPL-CCNC: 15 U/L (ref 2–50)
ANION GAP SERPL CALC-SCNC: 10 MMOL/L (ref 7–16)
AST SERPL-CCNC: 20 U/L (ref 12–45)
BASOPHILS # BLD AUTO: 0.4 % (ref 0–1.8)
BASOPHILS # BLD: 0.03 K/UL (ref 0–0.12)
BILIRUB SERPL-MCNC: 0.6 MG/DL (ref 0.1–1.5)
BUN SERPL-MCNC: 16 MG/DL (ref 8–22)
CALCIUM SERPL-MCNC: 8.6 MG/DL (ref 8.4–10.2)
CHLORIDE SERPL-SCNC: 96 MMOL/L (ref 96–112)
CO2 SERPL-SCNC: 25 MMOL/L (ref 20–33)
CREAT SERPL-MCNC: 0.61 MG/DL (ref 0.5–1.4)
EOSINOPHIL # BLD AUTO: 0.02 K/UL (ref 0–0.51)
EOSINOPHIL NFR BLD: 0.3 % (ref 0–6.9)
ERYTHROCYTE [DISTWIDTH] IN BLOOD BY AUTOMATED COUNT: 37.7 FL (ref 35.9–50)
GLOBULIN SER CALC-MCNC: 2.3 G/DL (ref 1.9–3.5)
GLUCOSE SERPL-MCNC: 127 MG/DL (ref 65–99)
HCT VFR BLD AUTO: 39.3 % (ref 37–47)
HGB BLD-MCNC: 13.8 G/DL (ref 12–16)
IMM GRANULOCYTES # BLD AUTO: 0.03 K/UL (ref 0–0.11)
IMM GRANULOCYTES NFR BLD AUTO: 0.4 % (ref 0–0.9)
LIPASE SERPL-CCNC: 129 U/L (ref 7–58)
LYMPHOCYTES # BLD AUTO: 0.63 K/UL (ref 1–4.8)
LYMPHOCYTES NFR BLD: 9.3 % (ref 22–41)
MCH RBC QN AUTO: 32.5 PG (ref 27–33)
MCHC RBC AUTO-ENTMCNC: 35.1 G/DL (ref 33.6–35)
MCV RBC AUTO: 92.5 FL (ref 81.4–97.8)
MONOCYTES # BLD AUTO: 0.3 K/UL (ref 0–0.85)
MONOCYTES NFR BLD AUTO: 4.4 % (ref 0–13.4)
NEUTROPHILS # BLD AUTO: 5.78 K/UL (ref 2–7.15)
NEUTROPHILS NFR BLD: 85.2 % (ref 44–72)
NRBC # BLD AUTO: 0 K/UL
NRBC BLD-RTO: 0 /100 WBC
PLATELET # BLD AUTO: 207 K/UL (ref 164–446)
PMV BLD AUTO: 10.2 FL (ref 9–12.9)
POTASSIUM SERPL-SCNC: 4.3 MMOL/L (ref 3.6–5.5)
PROT SERPL-MCNC: 6.3 G/DL (ref 6–8.2)
RBC # BLD AUTO: 4.25 M/UL (ref 4.2–5.4)
SODIUM SERPL-SCNC: 131 MMOL/L (ref 135–145)
WBC # BLD AUTO: 6.8 K/UL (ref 4.8–10.8)

## 2021-07-20 PROCEDURE — 99285 EMERGENCY DEPT VISIT HI MDM: CPT

## 2021-07-20 PROCEDURE — 85025 COMPLETE CBC W/AUTO DIFF WBC: CPT

## 2021-07-20 PROCEDURE — 700111 HCHG RX REV CODE 636 W/ 250 OVERRIDE (IP): Performed by: EMERGENCY MEDICINE

## 2021-07-20 PROCEDURE — 700102 HCHG RX REV CODE 250 W/ 637 OVERRIDE(OP): Performed by: EMERGENCY MEDICINE

## 2021-07-20 PROCEDURE — 96374 THER/PROPH/DIAG INJ IV PUSH: CPT

## 2021-07-20 PROCEDURE — 83690 ASSAY OF LIPASE: CPT

## 2021-07-20 PROCEDURE — 96375 TX/PRO/DX INJ NEW DRUG ADDON: CPT

## 2021-07-20 PROCEDURE — A9270 NON-COVERED ITEM OR SERVICE: HCPCS | Performed by: EMERGENCY MEDICINE

## 2021-07-20 PROCEDURE — 36415 COLL VENOUS BLD VENIPUNCTURE: CPT

## 2021-07-20 PROCEDURE — 80053 COMPREHEN METABOLIC PANEL: CPT

## 2021-07-20 PROCEDURE — 700105 HCHG RX REV CODE 258: Performed by: EMERGENCY MEDICINE

## 2021-07-20 RX ORDER — LORAZEPAM 2 MG/ML
0.5 INJECTION INTRAMUSCULAR ONCE
Status: COMPLETED | OUTPATIENT
Start: 2021-07-20 | End: 2021-07-20

## 2021-07-20 RX ORDER — SODIUM CHLORIDE 9 MG/ML
1000 INJECTION, SOLUTION INTRAVENOUS ONCE
Status: COMPLETED | OUTPATIENT
Start: 2021-07-20 | End: 2021-07-21

## 2021-07-20 RX ORDER — MECLIZINE HYDROCHLORIDE 25 MG/1
50 TABLET ORAL ONCE
Status: COMPLETED | OUTPATIENT
Start: 2021-07-20 | End: 2021-07-20

## 2021-07-20 RX ORDER — ONDANSETRON 2 MG/ML
4 INJECTION INTRAMUSCULAR; INTRAVENOUS ONCE
Status: COMPLETED | OUTPATIENT
Start: 2021-07-20 | End: 2021-07-20

## 2021-07-20 RX ADMIN — LORAZEPAM 0.5 MG: 2 INJECTION INTRAMUSCULAR; INTRAVENOUS at 22:45

## 2021-07-20 RX ADMIN — MECLIZINE HYDROCHLORIDE 50 MG: 25 TABLET ORAL at 22:44

## 2021-07-20 RX ADMIN — ONDANSETRON 4 MG: 2 INJECTION INTRAMUSCULAR; INTRAVENOUS at 21:39

## 2021-07-20 RX ADMIN — SODIUM CHLORIDE 1000 ML: 9 INJECTION, SOLUTION INTRAVENOUS at 21:39

## 2021-07-20 ASSESSMENT — FIBROSIS 4 INDEX: FIB4 SCORE: 2.1

## 2021-07-21 VITALS
OXYGEN SATURATION: 90 % | TEMPERATURE: 98.1 F | DIASTOLIC BLOOD PRESSURE: 82 MMHG | RESPIRATION RATE: 55 BRPM | HEART RATE: 68 BPM | WEIGHT: 106.7 LBS | SYSTOLIC BLOOD PRESSURE: 144 MMHG | HEIGHT: 63 IN | BODY MASS INDEX: 18.91 KG/M2

## 2021-07-21 RX ORDER — MECLIZINE HYDROCHLORIDE 25 MG/1
25 TABLET ORAL 4 TIMES DAILY
Qty: 40 TABLET | Refills: 0 | Status: SHIPPED | OUTPATIENT
Start: 2021-07-21 | End: 2021-11-22

## 2021-07-21 NOTE — DISCHARGE INSTRUCTIONS
Make sure you are drinking plenty of fluids and getting sleep.  Follow-up with your primary care provider this week for recheck.  Take the Antivert as needed for the dizziness.  Return to the ER if any problems or worsening.  Otherwise follow-up with your primary care provider this week for recheck.

## 2021-07-21 NOTE — ED NOTES
Pt provided with discharge paper work and follow up education. Pt educated on new prescription. Pt ambulated out of ER without complication.

## 2021-07-21 NOTE — ED PROVIDER NOTES
"CHIEF COMPLAINT  Chief Complaint   Patient presents with   • Diarrhea     one episode of diarrhea yesterday after breakfast and then again this morning.   • Dizziness     after diarrhea episode this morning   • Nausea     all day       HPI  Indy Patel is a 80 y.o. female who presents tonight with her daughter with a chief complaint of nausea and extreme dizziness since this morning.  She is also had 2 episodes of diarrhea since yesterday.  She denies any abdominal pain.  She has had no melena, hematochezia, fever, chills, productive cough or chest pain.  She has been depressed for her daughter secondary to the loss of her  to months ago after 62 years of marriage.  She states every time she attempts to get up she is very dizzy.    REVIEW OF SYSTEMS  See HPI for further details. All other system reviews are negative.    PAST MEDICAL HISTORY  Past Medical History:   Diagnosis Date   • CATARACT     surgically corrected left   • Colonic polyps    • GERD (gastroesophageal reflux disease)    • Glucose intolerance     \"reactive hypoglycemia\"   • Heart burn     gerd   • Heart murmur    • History of fundoplication 2001   • Indigestion    • IPMN (intraductal papillary mucinous neoplasm) 2007    dx @ [facility] / Dr. Palmer doubts   • Osteopenia    • Other specified disorder of intestines     diarrhea, steatorrhea   • Pancreatitis chronic     ?   • Personal history of venous thrombosis and embolism 2001, 1991    leg   • Reactive hypoglycemia    • S/P appendectomy 1991   • S/P cholecystectomy 2004   • S/P hysterectomy with oophorectomy 1990   • Superficial phlebitis of leg        FAMILY HISTORY  Family History   Problem Relation Age of Onset   • Diabetes Other    • Heart Disease Other    • Hypertension Other    • Cancer Other    • Glasses Mother    • Glasses Father    • Heart Disease Father        SOCIAL HISTORY  Social History     Socioeconomic History   • Marital status:      Spouse name: Not on file "   • Number of children: Not on file   • Years of education: Not on file   • Highest education level: Not on file   Occupational History   • Not on file   Tobacco Use   • Smoking status: Never Smoker   • Smokeless tobacco: Never Used   Substance and Sexual Activity   • Alcohol use: No     Comment: once a month   • Drug use: No   • Sexual activity: Not on file   Other Topics Concern   • Not on file   Social History Narrative    retired     Social Determinants of Health     Financial Resource Strain:    • Difficulty of Paying Living Expenses:    Food Insecurity:    • Worried About Running Out of Food in the Last Year:    • Ran Out of Food in the Last Year:    Transportation Needs:    • Lack of Transportation (Medical):    • Lack of Transportation (Non-Medical):    Physical Activity:    • Days of Exercise per Week:    • Minutes of Exercise per Session:    Stress:    • Feeling of Stress :    Social Connections:    • Frequency of Communication with Friends and Family:    • Frequency of Social Gatherings with Friends and Family:    • Attends [employer] Services:    • Active Member of Clubs or Organizations:    • Attends Club or Organization Meetings:    • Marital Status:    Intimate Partner Violence:    • Fear of Current or Ex-Partner:    • Emotionally Abused:    • Physically Abused:    • Sexually Abused:        SURGICAL HISTORY  Past Surgical History:   Procedure Laterality Date   • GASTROSCOPY WITH BIOPSY  1/23/2012    Performed by KIM KIM at [facility]   • EGD W/ENDOSCOPIC ULTRASOUND  1/23/2012    Performed by KIM KIM at [facility]   • ERCP W/SPHINCTEROTOMY/PAPILL.  6/7/2011    Performed by KIM KIM at [facility]   • GASTROSCOPY WITH BIOPSY  11/18/2010    Performed by KIM KIM at [facility]   • EGD W/ENDOSCOPIC ULTRASOUND  11/18/2010    Performed by KIM KIM at [facility]   • CATARACT EXTRACTION WITH IOL  11/2010     "left   • EGD W/ENDOSCOPIC ULTRASOUND  10/21/2009    Performed by KIM KIM at SUR[facility]ERY [facility]   • GASTROSCOPY WITH BIOPSY  10/21/2009    Performed by KIM KIM at SURGERY [facility]   • GASTROSCOPY WITH BIOPSY  4/25/08    Performed by KIM KIM at SURGERY [facility]   • EGD W/ENDOSCOPIC ULTRASOUND  4/25/08    Performed by KIM KIM at [facility]   • NISSEN FUNDOPLICATION LAPAROSCOPIC  2003    redo   • NISSEN FUNDOPLICATION LAPAROSCOPIC  2002   • APPENDECTOMY  1991   • GYN SURGERY      hysterectomy, bilateral salpingo-oophorectomies   • OTHER ABDOMINAL SURGERY      cholecystectomy, Nissen fundoplication with Toupet reoperation, appendectomy   • OTHER ORTHOPEDIC SURGERY      ankle surgery x3       CURRENT MEDICATIONS  See nurses notes    ALLERGIES  Allergies   Allergen Reactions   • Boniva [Ibandronate Sodium] Unspecified     Severe pain   • Ciprofloxacin Hives   • Prolia [Denosumab]      Severe pain   • Reglan [Metoclopramide Hcl] Shortness of Breath   • Vicodin [Hydrocodone-Acetaminophen] Vomiting and Nausea       PHYSICAL EXAM  VITAL SIGNS: /73   Pulse 64   Temp 36.7 °C (98.1 °F) (Temporal)   Resp 17   Ht 1.6 m (5' 3\")   Wt 48.4 kg (106 lb 11.2 oz)   SpO2 94%   BMI 18.90 kg/m²     Constitutional: Patient is a thin pale elderly female in mild distress from her vertigo.  HENT: Normocephalic. Oropharynx moist without erythema or exudates, nose normal with no mucosal edema or drainage.   Eyes: PERRL, EOMI  Neck: Supple    Cardiovascular: Normal heart rate and rhythm. No murmur  Thorax & Lungs: Clear and equal breath sounds with good excursion. No respiratory distress, no rhonchi, wheezing or rales.  Abdomen: Bowel sounds normal in all four quadrants. Soft,nontender, no rebound , guarding, palpable masses.   Skin: Warm, Dry, Pale  Back: No cervical, thoracic, or lumbosacral tenderness. No CVA tenderness.   Extremities: Peripheral pulses 4/4 No " edema, No tenderness  Musculoskeletal: Normal range of motion in all major joints. No tenderness to palpation or major deformities noted.   Neurologic: Alert & oriented x 3, Normal motor function, Normal sensory function, No lateralizing or focal deficits noted. DTR's 4/4 bilaterally.  Psychiatric: Affect flat, mildly depressed..     EKG  Results for orders placed or performed during the hospital encounter of 07/20/21   CBC WITH DIFFERENTIAL   Result Value Ref Range    WBC 6.8 4.8 - 10.8 K/uL    RBC 4.25 4.20 - 5.40 M/uL    Hemoglobin 13.8 12.0 - 16.0 g/dL    Hematocrit 39.3 37.0 - 47.0 %    MCV 92.5 81.4 - 97.8 fL    MCH 32.5 27.0 - 33.0 pg    MCHC 35.1 (H) 33.6 - 35.0 g/dL    RDW 37.7 35.9 - 50.0 fL    Platelet Count 207 164 - 446 K/uL    MPV 10.2 9.0 - 12.9 fL    Neutrophils-Polys 85.20 (H) 44.00 - 72.00 %    Lymphocytes 9.30 (L) 22.00 - 41.00 %    Monocytes 4.40 0.00 - 13.40 %    Eosinophils 0.30 0.00 - 6.90 %    Basophils 0.40 0.00 - 1.80 %    Immature Granulocytes 0.40 0.00 - 0.90 %    Nucleated RBC 0.00 /100 WBC    Neutrophils (Absolute) 5.78 2.00 - 7.15 K/uL    Lymphs (Absolute) 0.63 (L) 1.00 - 4.80 K/uL    Monos (Absolute) 0.30 0.00 - 0.85 K/uL    Eos (Absolute) 0.02 0.00 - 0.51 K/uL    Baso (Absolute) 0.03 0.00 - 0.12 K/uL    Immature Granulocytes (abs) 0.03 0.00 - 0.11 K/uL    NRBC (Absolute) 0.00 K/uL   COMP METABOLIC PANEL   Result Value Ref Range    Sodium 131 (L) 135 - 145 mmol/L    Potassium 4.3 3.6 - 5.5 mmol/L    Chloride 96 96 - 112 mmol/L    Co2 25 20 - 33 mmol/L    Anion Gap 10.0 7.0 - 16.0    Glucose 127 (H) 65 - 99 mg/dL    Bun 16 8 - 22 mg/dL    Creatinine 0.61 0.50 - 1.40 mg/dL    Calcium 8.6 8.4 - 10.2 mg/dL    AST(SGOT) 20 12 - 45 U/L    ALT(SGPT) 15 2 - 50 U/L    Alkaline Phosphatase 51 30 - 99 U/L    Total Bilirubin 0.6 0.1 - 1.5 mg/dL    Albumin 4.0 3.2 - 4.9 g/dL    Total Protein 6.3 6.0 - 8.2 g/dL    Globulin 2.3 1.9 - 3.5 g/dL    A-G Ratio 1.7 g/dL   LIPASE   Result Value Ref Range     Lipase 129 (H) 7 - 58 U/L   ESTIMATED GFR   Result Value Ref Range    GFR If African American >60 >60 mL/min/1.73 m 2    GFR If Non African American >60 >60 mL/min/1.73 m 2       RADIOLOGY/PROCEDURES  No orders to display         COURSE & MEDICAL DECISION MAKING  Pertinent Labs & Imaging studies reviewed. (See chart for details)  Patient received an IV with saline, p.o. Antivert was given and she was markedly improved upon recheck.  Laboratories were unremarkable.  Twelve-lead EKG shows sinus rhythm with no acute changes.  Chest x-ray shows no acute cardiopulmonary disease.  We attempted to ambulate the patient as she stated she was feeling better but she became somewhat shaky.  I am giving her some tea and crackers to see if this will make her feel better since she did not have dinner tonight.  Sooner she is feeling better she will be discharged.  She was given a prescription for Antivert to take 4 times daily as needed.  She is to follow-up with her primary care provider this week for recheck.  Return if any problems or worsening.  She is currently on trazodone that she takes at night to sleep and I have suggested that she continue to follow-up with her primary care doctor regarding her situational depression secondary to her recent loss of her .  She is discharged in the care of her daughter.        FINAL IMPRESSION  1.  Acute vertigo  2.  Diarrhea   3.  Situational depression         Electronically signed by: Tosha Orellana D.O., 7/21/2021 12:24 JENNIFER Provider Note

## 2021-09-28 ENCOUNTER — OFFICE VISIT (OUTPATIENT)
Dept: OPHTHALMOLOGY | Facility: MEDICAL CENTER | Age: 81
End: 2021-09-28
Payer: MEDICARE

## 2021-09-28 DIAGNOSIS — Z96.1 PSEUDOPHAKIA OF BOTH EYES: ICD-10-CM

## 2021-09-28 DIAGNOSIS — H53.9 TRANSIENT VISION DISTURBANCE OF BOTH EYES: ICD-10-CM

## 2021-09-28 DIAGNOSIS — H52.213 IRREGULAR ASTIGMATISM OF BOTH EYES: ICD-10-CM

## 2021-09-28 PROCEDURE — 92250 FUNDUS PHOTOGRAPHY W/I&R: CPT | Performed by: OPHTHALMOLOGY

## 2021-09-28 PROCEDURE — 92015 DETERMINE REFRACTIVE STATE: CPT | Performed by: OPHTHALMOLOGY

## 2021-09-28 PROCEDURE — 99214 OFFICE O/P EST MOD 30 MIN: CPT | Mod: 25 | Performed by: OPHTHALMOLOGY

## 2021-09-28 RX ORDER — LORAZEPAM 0.5 MG/1
0.5 TABLET ORAL
COMMUNITY
Start: 2021-05-22 | End: 2021-11-22

## 2021-09-28 RX ORDER — TRAZODONE HYDROCHLORIDE 50 MG/1
50 TABLET ORAL
COMMUNITY
Start: 2021-07-23 | End: 2021-11-22

## 2021-09-28 ASSESSMENT — REFRACTION_MANIFEST
OS_AXIS: 004
OD_CYLINDER: +1.00
OD_AXIS: 180
OS_SPHERE: +0.25
OS_CYLINDER: +0.50
OD_SPHERE: -0.75

## 2021-09-28 ASSESSMENT — VISUAL ACUITY
OD_PH_CC: 20/25
OS_SC: 20/40
OS_PH_CC: 20/20
OD_SC: 20/25
OD_SC+: -2
OS_PH_CC+: -2
METHOD: SNELLEN - LINEAR

## 2021-09-28 ASSESSMENT — REFRACTION
OD_AXIS: 002
OS_CYLINDER: +0.50
OD_CYLINDER: +1.00
OS_SPHERE: +0.25
OD_SPHERE: -0.75
OS_AXIS: 180

## 2021-09-28 ASSESSMENT — SLIT LAMP EXAM - LIDS
COMMENTS: NORMAL
COMMENTS: NORMAL

## 2021-09-28 ASSESSMENT — EXTERNAL EXAM - LEFT EYE: OS_EXAM: NORMAL

## 2021-09-28 ASSESSMENT — CONF VISUAL FIELD
OS_NORMAL: 1
OD_NORMAL: 1

## 2021-09-28 ASSESSMENT — TONOMETRY
IOP_METHOD: I-CARE
OD_IOP_MMHG: 12
OS_IOP_MMHG: 13

## 2021-09-28 ASSESSMENT — EXTERNAL EXAM - RIGHT EYE: OD_EXAM: NORMAL

## 2021-09-28 ASSESSMENT — CUP TO DISC RATIO
OS_RATIO: 0.2
OD_RATIO: 0.3

## 2021-09-28 NOTE — PROGRESS NOTES
"Peds/Neuro Ophthalmology:   Zachary Spangler M.D.    Date & Time note created:    9/28/2021   12:33 PM     Referring MD / APRN:  J Timothy Lombard, M.D., No att. providers found    Patient ID:  Name:             Indy Patel   YOB: 1940  Age:                 81 y.o.  female   MRN:               [id number]    Chief Complaint/Reason for Visit:     Other (3 month F/u for Transient vision disturbance of both eyes)      History of Present Illness:    Indy Patel is a 81 y.o. female   Pt is here for a 3 month F/u for Transient vision disturbance of both eyes. Pt states vision is stable OU. Pt denies pain or discomfort OU.       Review of Systems:  Review of Systems   Eyes:        Transient vision disturbance of both eyes   All other systems reviewed and are negative.      Past Medical History:   Past Medical History:   Diagnosis Date   • CATARACT     surgically corrected left   • Colonic polyps    • GERD (gastroesophageal reflux disease)    • Glucose intolerance     \"reactive hypoglycemia\"   • Heart burn     gerd   • Heart murmur    • History of fundoplication 2001   • Indigestion    • IPMN (intraductal papillary mucinous neoplasm) 2007    dx @ [facility] / Dr. Kim doubts   • Osteopenia    • Other specified disorder of intestines     diarrhea, steatorrhea   • Pancreatitis chronic     ?   • Personal history of venous thrombosis and embolism 2001, 1991    leg   • Reactive hypoglycemia    • S/P appendectomy 1991   • S/P cholecystectomy 2004   • S/P hysterectomy with oophorectomy 1990   • Superficial phlebitis of leg        Past Surgical History:  Past Surgical History:   Procedure Laterality Date   • GASTROSCOPY WITH BIOPSY  1/23/2012    Performed by KIM KIM at SURGERY [facility]   • EGD W/ENDOSCOPIC ULTRASOUND  1/23/2012    Performed by KIM KIM at [facility]   • ERCP W/SPHINCTEROTOMY/PAPILL.  6/7/2011    Performed by KIM KIM at SURGERY " [facility]S   • GASTROSCOPY WITH BIOPSY  11/18/2010    Performed by KIM KIM at SURGERY [facility]   • EGD W/ENDOSCOPIC ULTRASOUND  11/18/2010    Performed by KIM KIM at SURGERY [facility]   • CATARACT EXTRACTION WITH IOL  11/2010    left   • EGD W/ENDOSCOPIC ULTRASOUND  10/21/2009    Performed by KIM KIM at SURGERY [facility]   • GASTROSCOPY WITH BIOPSY  10/21/2009    Performed by KIM KIM at [facility]   • GASTROSCOPY WITH BIOPSY  4/25/08    Performed by KIM KIM at SURGERY [facility]   • EGD W/ENDOSCOPIC ULTRASOUND  4/25/08    Performed by KIM KIM at SURGERY [facility]   • NISSEN FUNDOPLICATION LAPAROSCOPIC  2003    redo   • NISSEN FUNDOPLICATION LAPAROSCOPIC  2002   • APPENDECTOMY  1991   • GYN SURGERY      hysterectomy, bilateral salpingo-oophorectomies   • OTHER ABDOMINAL SURGERY      cholecystectomy, Nissen fundoplication with Toupet reoperation, appendectomy   • OTHER ORTHOPEDIC SURGERY      ankle surgery x3       Current Outpatient Medications:  Current Outpatient Medications   Medication Sig Dispense Refill   • Calcium Carbonate-Vitamin D (CALCIUM-VITAMIN D) 600-125 MG-UNIT Tab Take  by mouth.     • LORazepam (ATIVAN) 0.5 MG Tab Take 0.5 mg by mouth.     • traZODone (DESYREL) 50 MG Tab Take 50 mg by mouth.     • verapamil (ISOPTIN) 120 MG Tab Take 120 mg by mouth 3 times a day.     • alendronate (FOSAMAX) 70 MG Tab Take 70 mg by mouth every 7 days. On Monday     • digoxin (LANOXIN) 125 MCG Tab Take 125 mcg by mouth every day.     • Vitamin E 180 MG Cap Take 180 mg by mouth every day.     • mesalamine (LIALDA) 1.2 GM Tablet Delayed Response Take 1.2 g by mouth every morning with breakfast.     • Cyanocobalamin (B-12) 1000 MCG Tab Take 1,000 mcg by mouth every day.     • Probiotic Product (PROBIOTIC DAILY PO) Take 1 Capsule by mouth every day.     • meclizine (ANTIVERT) 25 MG Tab Take 1 tablet by mouth 4 times a  day. As needed for dizziness. (Patient not taking: Reported on 9/28/2021) 40 tablet 0   • Magnesium 500 MG Cap Take 500 mg by mouth every day. (Patient not taking: Reported on 6/3/2021)     • DILTIAZem CD (CARDIZEM CD) 120 MG CAPSULE SR 24 HR Take 120 mg by mouth every evening. (Patient not taking: Reported on 6/3/2021)       No current facility-administered medications for this visit.       Allergies:  Allergies   Allergen Reactions   • Amitriptyline Unspecified     Dry mouth tremor  Imipramine     Dry mouth tremor  Imipramine     Dry mouth tremor  Imipramine        • Boniva [Ibandronate Sodium] Unspecified     Severe pain   • Carvedilol Vomiting   • Ciprofloxacin Hives   • Gabapentin Unspecified     ??? Side effect  ??? Side effect     • Gluten Meal Unspecified   • Metformin Unspecified     Blood glucose dropped lower  Blood glucose dropped lower  Blood glucose dropped lower     • Metoclopramide Hcl Unspecified     Reglan  Reglan     • Metoprolol Vomiting   • Ondansetron Unspecified     Possible atrial fibrillation   • Oxycodone-Acetaminophen Unspecified and Vomiting   • Paroxetine Hcl Unspecified     Nervous insomnia  Nervous insomnia     • Prolia [Denosumab]      Severe pain   • Reglan [Metoclopramide Hcl] Shortness of Breath   • Vicodin [Hydrocodone-Acetaminophen] Vomiting and Nausea       Family History:  Family History   Problem Relation Age of Onset   • Diabetes Other    • Heart Disease Other    • Hypertension Other    • Cancer Other    • Glasses Mother    • Glasses Father    • Heart Disease Father        Social History:  Social History     Socioeconomic History   • Marital status:      Spouse name: Not on file   • Number of children: Not on file   • Years of education: Not on file   • Highest education level: Not on file   Occupational History   • Not on file   Tobacco Use   • Smoking status: Never Smoker   • Smokeless tobacco: Never Used   Substance and Sexual Activity   • Alcohol use: No      Comment: once a month   • Drug use: No   • Sexual activity: Not on file   Other Topics Concern   • Not on file   Social History Narrative    Retired     Social Determinants of Health     Financial Resource Strain:    • Difficulty of Paying Living Expenses:    Food Insecurity:    • Worried About Running Out of Food in the Last Year:    • Ran Out of Food in the Last Year:    Transportation Needs:    • Lack of Transportation (Medical):    • Lack of Transportation (Non-Medical):    Physical Activity:    • Days of Exercise per Week:    • Minutes of Exercise per Session:    Stress:    • Feeling of Stress :    Social Connections:    • Frequency of Communication with Friends and Family:    • Frequency of Social Gatherings with Friends and Family:    • Attends [employer] Services:    • Active Member of Clubs or Organizations:    • Attends Club or Organization Meetings:    • Marital Status:    Intimate Partner Violence:    • Fear of Current or Ex-Partner:    • Emotionally Abused:    • Physically Abused:    • Sexually Abused:           Physical Exam:  Physical Exam    Oriented x 3  Weight/BMI: There is no height or weight on file to calculate BMI.  There were no vitals taken for this visit.    Base Eye Exam     Visual Acuity (Snellen - Linear)       Right Left    Dist sc 20/25 -2 20/40    Dist ph cc 20/25 20/20 -2          Tonometry (I-care, 10:15 AM)       Right Left    Pressure 12 13          Pupils       Pupils    Right PERRL    Left PERRL          Visual Fields       Right Left     Full Full          Neuro/Psych     Oriented x3: Yes    Mood/Affect: Normal            Slit Lamp and Fundus Exam     External Exam       Right Left    External Normal Normal          Slit Lamp Exam       Right Left    Lids/Lashes Normal Normal    Conjunctiva/Sclera White and quiet White and quiet    Cornea Clear Clear    Anterior Chamber Deep and quiet Deep and quiet    Iris Round and reactive Round and reactive    Lens Posterior chamber  intraocular lens Posterior chamber intraocular lens, Posterior capsular opacification    Vitreous Normal Normal          Fundus Exam       Right Left    Disc Normal Normal    C/D Ratio 0.3 0.2    Macula Normal Normal    Vessels Normal Normal    Periphery Normal Normal            Refraction     Manifest Refraction       Sphere Cylinder Axis    Right -0.75 +1.00 180    Left +0.25 +0.50 004          Cycloplegic Refraction (Auto)       Sphere Cylinder Axis    Right -0.75 +1.00 002    Left +0.25 +0.50 180          Final Rx       Sphere Cylinder Axis Add    Right -0.75 +1.00 002 +3.00    Left +0.25 +0.50 180 +3.00                Pertinent Lab/Test/Imaging Review:      Assessment and Plan:     Transient vision disturbance of both eyes  6/3/2021 - Episode of transient vision change described as 3 circular black balls with coloration lasting only seconds. One episode associated with hypoglycemia. Although these could be presyncopal episodes, concern would be for a posterior circulation TIA. Has a history of A-Fib, but more recent Holter no significant episodes and according to patient aslo had carotid studies that were unremarkable. Started an ASA per day and no episodes since. Thus to be complete recommend that Dr Lombard precede with Cardiac Echo to rule out valvular disease or right to left shunt. Recommended continuing on the ASA and if episodes return would need to consider MRI/ MRA looking for other changes or posterior circulation disease and refer to stroke neurologist.   6/29/2021 - She called this morning stating that she had been outside, and when she went back into the house, she had an episode with the dark spots, she tested her blood sugar, and it was low (58).  She took 2 glucose tabs and it resolved the issue.  Advised to let her primary care physician know.   9/28/2021 - no episodes of vision spots since last visit. According to patint no tests since June. Retinal vasculature normal, no areas of old plaques.      Pseudophakia of both eyes  6/3/2021 - IOL in place, no significant PCO  9/28/2021 - IOL in place. Developing some PCO temporally in OS, but not in central area at this time.     Irregular astigmatism of both eyes  9/28/2021 - Some residual astigmatism post CE. Will give glasses rx        Zachary Spangler M.D.

## 2021-09-28 NOTE — ASSESSMENT & PLAN NOTE
6/3/2021 - Episode of transient vision change described as 3 circular black balls with coloration lasting only seconds. One episode associated with hypoglycemia. Although these could be presyncopal episodes, concern would be for a posterior circulation TIA. Has a history of A-Fib, but more recent Holter no significant episodes and according to patient aslo had carotid studies that were unremarkable. Started an ASA per day and no episodes since. Thus to be complete recommend that Dr Lombard precede with Cardiac Echo to rule out valvular disease or right to left shunt. Recommended continuing on the ASA and if episodes return would need to consider MRI/ MRA looking for other changes or posterior circulation disease and refer to stroke neurologist.   6/29/2021 - She called this morning stating that she had been outside, and when she went back into the house, she had an episode with the dark spots, she tested her blood sugar, and it was low (58).  She took 2 glucose tabs and it resolved the issue.  Advised to let her primary care physician know.   9/28/2021 - no episodes of vision spots since last visit. According to patint no tests since June. Retinal vasculature normal, no areas of old plaques.

## 2021-09-28 NOTE — ASSESSMENT & PLAN NOTE
6/3/2021 - IOL in place, no significant PCO  9/28/2021 - IOL in place. Developing some PCO temporally in OS, but not in central area at this time.

## 2021-11-22 ENCOUNTER — HOSPITAL ENCOUNTER (EMERGENCY)
Facility: MEDICAL CENTER | Age: 81
End: 2021-11-22
Attending: EMERGENCY MEDICINE
Payer: MEDICARE

## 2021-11-22 ENCOUNTER — APPOINTMENT (OUTPATIENT)
Dept: RADIOLOGY | Facility: MEDICAL CENTER | Age: 81
End: 2021-11-22
Attending: EMERGENCY MEDICINE
Payer: MEDICARE

## 2021-11-22 VITALS
WEIGHT: 103.17 LBS | OXYGEN SATURATION: 97 % | HEIGHT: 63 IN | SYSTOLIC BLOOD PRESSURE: 141 MMHG | DIASTOLIC BLOOD PRESSURE: 89 MMHG | RESPIRATION RATE: 16 BRPM | TEMPERATURE: 97.3 F | BODY MASS INDEX: 18.28 KG/M2 | HEART RATE: 65 BPM

## 2021-11-22 DIAGNOSIS — M25.512 ACUTE PAIN OF LEFT SHOULDER: ICD-10-CM

## 2021-11-22 LAB
ALBUMIN SERPL BCP-MCNC: 4.1 G/DL (ref 3.2–4.9)
ALBUMIN/GLOB SERPL: 1.5 G/DL
ALP SERPL-CCNC: 57 U/L (ref 30–99)
ALT SERPL-CCNC: 20 U/L (ref 2–50)
ANION GAP SERPL CALC-SCNC: 8 MMOL/L (ref 7–16)
AST SERPL-CCNC: 23 U/L (ref 12–45)
BASOPHILS # BLD AUTO: 0.8 % (ref 0–1.8)
BASOPHILS # BLD: 0.04 K/UL (ref 0–0.12)
BILIRUB SERPL-MCNC: 0.4 MG/DL (ref 0.1–1.5)
BUN SERPL-MCNC: 17 MG/DL (ref 8–22)
CALCIUM SERPL-MCNC: 9.1 MG/DL (ref 8.4–10.2)
CHLORIDE SERPL-SCNC: 102 MMOL/L (ref 96–112)
CO2 SERPL-SCNC: 30 MMOL/L (ref 20–33)
CREAT SERPL-MCNC: 0.66 MG/DL (ref 0.5–1.4)
EKG IMPRESSION: NORMAL
EOSINOPHIL # BLD AUTO: 0.06 K/UL (ref 0–0.51)
EOSINOPHIL NFR BLD: 1.2 % (ref 0–6.9)
ERYTHROCYTE [DISTWIDTH] IN BLOOD BY AUTOMATED COUNT: 39.1 FL (ref 35.9–50)
GLOBULIN SER CALC-MCNC: 2.8 G/DL (ref 1.9–3.5)
GLUCOSE SERPL-MCNC: 92 MG/DL (ref 65–99)
HCT VFR BLD AUTO: 43.4 % (ref 37–47)
HGB BLD-MCNC: 14.8 G/DL (ref 12–16)
IMM GRANULOCYTES # BLD AUTO: 0.01 K/UL (ref 0–0.11)
IMM GRANULOCYTES NFR BLD AUTO: 0.2 % (ref 0–0.9)
LYMPHOCYTES # BLD AUTO: 0.88 K/UL (ref 1–4.8)
LYMPHOCYTES NFR BLD: 17.3 % (ref 22–41)
MCH RBC QN AUTO: 32.2 PG (ref 27–33)
MCHC RBC AUTO-ENTMCNC: 34.1 G/DL (ref 33.6–35)
MCV RBC AUTO: 94.3 FL (ref 81.4–97.8)
MONOCYTES # BLD AUTO: 0.38 K/UL (ref 0–0.85)
MONOCYTES NFR BLD AUTO: 7.5 % (ref 0–13.4)
NEUTROPHILS # BLD AUTO: 3.73 K/UL (ref 2–7.15)
NEUTROPHILS NFR BLD: 73 % (ref 44–72)
NRBC # BLD AUTO: 0 K/UL
NRBC BLD-RTO: 0 /100 WBC
PLATELET # BLD AUTO: 233 K/UL (ref 164–446)
PMV BLD AUTO: 9.9 FL (ref 9–12.9)
POTASSIUM SERPL-SCNC: 4 MMOL/L (ref 3.6–5.5)
PROT SERPL-MCNC: 6.9 G/DL (ref 6–8.2)
RBC # BLD AUTO: 4.6 M/UL (ref 4.2–5.4)
SODIUM SERPL-SCNC: 140 MMOL/L (ref 135–145)
TROPONIN T SERPL-MCNC: <6 NG/L (ref 6–19)
TSH SERPL DL<=0.005 MIU/L-ACNC: 1.2 UIU/ML (ref 0.38–5.33)
WBC # BLD AUTO: 5.1 K/UL (ref 4.8–10.8)

## 2021-11-22 PROCEDURE — 93005 ELECTROCARDIOGRAM TRACING: CPT

## 2021-11-22 PROCEDURE — 80053 COMPREHEN METABOLIC PANEL: CPT

## 2021-11-22 PROCEDURE — 99284 EMERGENCY DEPT VISIT MOD MDM: CPT

## 2021-11-22 PROCEDURE — 71046 X-RAY EXAM CHEST 2 VIEWS: CPT

## 2021-11-22 PROCEDURE — 84484 ASSAY OF TROPONIN QUANT: CPT

## 2021-11-22 PROCEDURE — 84443 ASSAY THYROID STIM HORMONE: CPT

## 2021-11-22 PROCEDURE — 93005 ELECTROCARDIOGRAM TRACING: CPT | Performed by: EMERGENCY MEDICINE

## 2021-11-22 PROCEDURE — 85025 COMPLETE CBC W/AUTO DIFF WBC: CPT

## 2021-11-22 RX ORDER — TRAMADOL HYDROCHLORIDE 50 MG/1
50 TABLET ORAL EVERY 4 HOURS PRN
Status: ON HOLD | COMMUNITY
End: 2024-03-11

## 2021-11-22 ASSESSMENT — FIBROSIS 4 INDEX: FIB4 SCORE: 2.05

## 2021-11-22 NOTE — ED TRIAGE NOTES
"81 yr old female to triage  Chief Complaint   Patient presents with   • Shoulder Pain     Patient report of left shoulder pain on Saturday night and Sunday morning then notice her blood pressure was high yesterday.  Patient denies having any pain, does not feel lighthead or dizzy.      /89   Pulse 65   Temp 36.3 °C (97.3 °F) (Temporal)   Resp 16   Ht 1.6 m (5' 3\")   Wt 46.8 kg (103 lb 2.8 oz)   SpO2 97%   BMI 18.28 kg/m²     Has this patient been vaccinated for COVID Yes Pfizer  If not, would they like to be vaccinated while in the ER if eligible?  N/A  Would the patient like to speak with the ERP about the possibility of receiving the COVID vaccine today before making a decision? N/A    "

## 2021-11-23 NOTE — ED PROVIDER NOTES
ED Provider Note    CHIEF COMPLAINT  Chief Complaint   Patient presents with   • Shoulder Pain     Patient report of left shoulder pain on Saturday night and Sunday morning then notice her blood pressure was high yesterday.  Patient denies having any pain, does not feel lighthead or dizzy.        HPI  Indy Patel is a 81 y.o. female who presents with pain to the left upper chest in the supraclavicular area near the sternoclavicular joint region. She awoke with this pain over the past 2 days on Saturday and Sunday.  It lasted minutes at a time. No diaphoresis or vomiting.  No cough or trouble breathing. Denies prior history of similar symptoms in the past.    She states that she contacted her primary care provider who instructed her to come to the emergency department for further evaluation. She is currently asymptomatic. She states that she may have had some slight discomfort while in the waiting room. No active discomfort at this time.    The put patient is on verapamil and digoxin chronically. Notes that she had A. fib in the past. She is not on chronic anticoagulation. Denies known cardiovascular disease history.     Incidentally, she notes that she has had unintentional weight loss over the past 6 or 7 months after the  death of her .      REVIEW OF SYSTEMS  See HPI for further details. All other systems are negative.     PAST MEDICAL HISTORY   has a past medical history of CATARACT, Colonic polyps, GERD (gastroesophageal reflux disease), Glucose intolerance, Heart burn, Heart murmur, History of fundoplication (2001), Indigestion, IPMN (intraductal papillary mucinous neoplasm) (2007), Osteopenia, Other specified disorder of intestines, Pancreatitis chronic, Personal history of venous thrombosis and embolism (2001, 1991), Reactive hypoglycemia, S/P appendectomy (1991), S/P cholecystectomy (2004), S/P hysterectomy with oophorectomy (1990), and Superficial phlebitis of leg.    SOCIAL  HISTORY  Social History     Tobacco Use   • Smoking status: Never Smoker   • Smokeless tobacco: Never Used   Vaping Use   • Vaping Use: Not on file   Substance and Sexual Activity   • Alcohol use: No     Comment: once a month   • Drug use: No   • Sexual activity: Not on file       SURGICAL HISTORY   has a past surgical history that includes gastroscopy with biopsy (4/25/08); egd w/endoscopic ultrasound (4/25/08); other orthopedic surgery; other abdominal surgery; gyn surgery; appendectomy (1991); egd w/endoscopic ultrasound (10/21/2009); gastroscopy with biopsy (10/21/2009); gastroscopy with biopsy (11/18/2010); egd w/endoscopic ultrasound (11/18/2010); cataract extraction with iol (11/2010); nissen fundoplication laparoscopic (2002); nissen fundoplication laparoscopic (2003); ercp w/sphincterotomy/papill. (6/7/2011); gastroscopy with biopsy (1/23/2012); and egd w/endoscopic ultrasound (1/23/2012).    CURRENT MEDICATIONS  Home Medications     Reviewed by Laura Griffith R.N. (Registered Nurse) on 11/22/21 at 1450  Med List Status: Partial   Medication Last Dose Status   alendronate (FOSAMAX) 70 MG Tab 11/22/2021 Active   Calcium Carbonate-Vitamin D (CALCIUM-VITAMIN D) 600-125 MG-UNIT Tab 11/22/2021 Active   Cyanocobalamin (B-12) 1000 MCG Tab 11/22/2021 Active   digoxin (LANOXIN) 125 MCG Tab 11/21/2021 Active   DILTIAZem CD (CARDIZEM CD) 120 MG CAPSULE SR 24 HR  Active   mesalamine (LIALDA) 1.2 GM Tablet Delayed Response 11/21/2021 Active   Probiotic Product (PROBIOTIC DAILY PO) 11/22/2021 Active   traMADol (ULTRAM) 50 MG Tab  Active   verapamil (ISOPTIN) 120 MG Tab 11/22/2021 Active   Vitamin E 180 MG Cap 11/22/2021 Active                ALLERGIES  Allergies   Allergen Reactions   • Amitriptyline Unspecified     Dry mouth tremor  Imipramine     Dry mouth tremor  Imipramine     Dry mouth tremor  Imipramine        • Boniva [Ibandronate Sodium] Unspecified     Severe pain   • Carvedilol Vomiting   • Ciprofloxacin Hives  "  • Gabapentin Unspecified     ??? Side effect  ??? Side effect     • Gluten Meal Unspecified   • Metformin Unspecified     Blood glucose dropped lower  Blood glucose dropped lower  Blood glucose dropped lower     • Metoclopramide Hcl Unspecified     Reglan  Reglan     • Metoprolol Vomiting   • Ondansetron Unspecified     Possible atrial fibrillation   • Oxycodone-Acetaminophen Unspecified and Vomiting   • Paroxetine Hcl Unspecified     Nervous insomnia  Nervous insomnia     • Prolia [Denosumab]      Severe pain   • Reglan [Metoclopramide Hcl] Shortness of Breath   • Vicodin [Hydrocodone-Acetaminophen] Vomiting and Nausea       PHYSICAL EXAM  VITAL SIGNS: /89   Pulse 65   Temp 36.3 °C (97.3 °F) (Temporal)   Resp 16   Ht 1.6 m (5' 3\")   Wt 46.8 kg (103 lb 2.8 oz)   SpO2 97%   BMI 18.28 kg/m²   Pulse ox interpretation: I interpret this pulse ox as normal.  Constitutional: Alert in no apparent distress.  HENT: No signs of trauma, Bilateral external ears normal, Nose normal.   Eyes: Pupils are equal and reactive, Conjunctiva normal, Non-icteric.   Neck: Normal range of motion, Supple, No stridor.   Lymphatic: No lymphadenopathy noted.   Cardiovascular: Regular rate and rhythm.   Thorax & Lungs: Normal breath sounds, No respiratory distress, No wheezing, No chest tenderness.   Abdomen: Soft, No tenderness  Skin: Warm, Dry, No erythema, No rash.   Extremities: Intact distal pulses, No edema, No cyanosis  Musculoskeletal: Good range of motion in all major joints. No major deformities noted.   Neurologic: Alert, No focal deficits noted.       DIAGNOSTIC STUDIES / PROCEDURES    EKG - Physician interpretation  Results for orders placed or performed during the hospital encounter of 11/22/21   EKG   Result Value Ref Range    Report       [facility]    Test Date:  2021-11-22  Pt Name:    EVER ROYAL               Department: [facility]  MRN:        [id number]                      " Room:  Gender:     Female                       Technician: DUY  :        [id number]                   Requested By:ER TRIAGE PROTOCOL  Order #:    [id number]                    Reading MD: ESPERANZA CARVAJAL MD    Measurements  Intervals                                Axis  Rate:       63                           P:          63  MA:         160                          QRS:        -28  QRSD:       92                           T:  QT:         504  QTc:        517    Interpretive Statements  SINUS RHYTHM  PROBABLE LEFT ATRIAL ABNORMALITY  BORDERLINE LEFT AXIS DEVIATION  PROLONGED QT INTERVAL  Compared to ECG 2020 16:33:38  Prolonged QT interval now present  Electronically Signed On 2021 17:10:42 PST by ESPERANZA CARVAJAL MD           LABS  Labs Reviewed   CBC WITH DIFFERENTIAL - Abnormal; Notable for the following components:       Result Value    Neutrophils-Polys 73.00 (*)     Lymphocytes 17.30 (*)     Lymphs (Absolute) 0.88 (*)     All other components within normal limits   COMP METABOLIC PANEL   TROPONIN   TSH WITH REFLEX TO FT4   ESTIMATED GFR         RADIOLOGY  DX-CHEST-2 VIEWS   Final Result      No acute cardiopulmonary abnormality.            COURSE & MEDICAL DECISION MAKING    Medications - No data to display    Pertinent Labs & Imaging studies reviewed. (See chart for details)  81 y.o. female presenting with transient discomfort in the supraclavicular region of the left shoulder/neck chest area near the sternoclavicular region of the left chest.  No associated vomiting or diaphoresis.  It occurred while at rest.  No history of recent trauma.  Normal range of motion of bilateral upper extremities without difficulty.  No focal neurologic deficits.  No difficulty with swallowing.  Normal speech.  No shortness of breath symptoms.  She is currently asymptomatic.    Clear breath sounds bilaterally.  EKG was performed upon arrival which was unremarkable for ischemic changes.  She did have a prolonged QT.  No new  "medications.    Though this discomfort seems very unlikely to be an ACS equivalent, chest pain work-up was performed which was reassuring.  She does have a heart score of 3 given age and risk factors.  Troponin is negative.    Chest x-ray does not show a pulmonary abnormality, especially in the left apex region, to explain her symptoms today.  No palpable lymph nodes along this region.  No redness or swelling.  Thyroid function is normal.    Overall, the work-up today is rather reassuring.  I believe it is highly unlikely that this is related to any cardiac anomaly.  No obvious signs of left upper extremity pain or decreased range of motion.  Pain does not appear to be reproducible.  No obvious signs of musculoskeletal cause either.  Again, no palpable mass or lymph nodes.  No skin changes.  No obvious signs of a soft tissue abnormality at this time.  Patient states that she typically has low blood pressure and her pressure was low but higher than usual here today.  I am hesitant to aggressively manage her pressure at 141/89 and there is risk for overtreating her blood pressure and resulting in hypotension.  I recommended that she continue monitoring her blood pressure at home and following up with her primary care physician.    I spoke with the patient's primary care physician on the phone ([phone]) to give him an update regarding the test results from today.  He was informed of the results and does not have further recommendations at this time.  He will follow up with the patient on an outpatient basis.    The patient was instructed to follow-up with primary care physician for further management.  To return immediately for any worsening symptoms or development of any other concerning signs or symptoms. The patient verbalizes understanding in their own words.    /89   Pulse 65   Temp 36.3 °C (97.3 °F) (Temporal)   Resp 16   Ht 1.6 m (5' 3\")   Wt 46.8 kg (103 lb 2.8 oz)   SpO2 97%   BMI 18.28 kg/m² "     The patient was referred to primary care where they will receive further BP management.      J Timothy Lombard, M.D.  [address] [facility] [address] [phone]    Schedule an appointment as soon as possible for a visit       [facility], Emergency Dept  [address]  [facility] [id number] [phone]    As needed, If symptoms worsen      FINAL IMPRESSION  1. Acute pain of left shoulder            Electronically signed by: Keaton Lang M.D., 11/22/2021 5:09 PM

## 2021-11-23 NOTE — ED NOTES
Pt seen by ERP. ERP discussed results with pt.     Pt left before I could go over discharge paperwork.

## 2021-11-23 NOTE — ED NOTES
Pt ambulates back to room. Pt states she had left shoulder pain on Saturday and then she had high blood pressure yesterday and today.     Call light within reach.

## 2022-03-14 ENCOUNTER — OFFICE VISIT (OUTPATIENT)
Dept: OPHTHALMOLOGY | Facility: MEDICAL CENTER | Age: 82
End: 2022-03-14
Payer: MEDICARE

## 2022-03-14 DIAGNOSIS — H53.9 TRANSIENT VISION DISTURBANCE OF BOTH EYES: ICD-10-CM

## 2022-03-14 PROCEDURE — 92014 COMPRE OPH EXAM EST PT 1/>: CPT | Mod: 25 | Performed by: OPHTHALMOLOGY

## 2022-03-14 PROCEDURE — 92250 FUNDUS PHOTOGRAPHY W/I&R: CPT | Performed by: OPHTHALMOLOGY

## 2022-03-14 RX ORDER — CHOLESTYRAMINE 4 G/9G
POWDER, FOR SUSPENSION ORAL
COMMUNITY
Start: 2021-10-08 | End: 2024-01-04

## 2022-03-14 ASSESSMENT — VISUAL ACUITY
OD_SC: 20/20
OS_SC: 20/25
METHOD: SNELLEN - LINEAR

## 2022-03-14 ASSESSMENT — TONOMETRY
IOP_METHOD: I-CARE
OS_IOP_MMHG: 15
OD_IOP_MMHG: 14

## 2022-03-14 ASSESSMENT — CUP TO DISC RATIO
OD_RATIO: 0.3
OS_RATIO: 0.2

## 2022-03-14 ASSESSMENT — CONF VISUAL FIELD
OS_NORMAL: 1
OD_NORMAL: 1

## 2022-03-14 ASSESSMENT — REFRACTION_MANIFEST
OD_SPHERE: -1.25
OD_CYLINDER: +1.00
OS_SPHERE: +0.00
METHOD_AUTOREFRACTION: 1
OS_CYLINDER: +0.50
OS_AXIS: 174
OD_AXIS: 175

## 2022-03-14 ASSESSMENT — SLIT LAMP EXAM - LIDS
COMMENTS: NORMAL
COMMENTS: NORMAL

## 2022-03-14 ASSESSMENT — EXTERNAL EXAM - RIGHT EYE: OD_EXAM: NORMAL

## 2022-03-14 ASSESSMENT — EXTERNAL EXAM - LEFT EYE: OS_EXAM: NORMAL

## 2022-03-14 NOTE — ASSESSMENT & PLAN NOTE
6/3/2021 - Episode of transient vision change described as 3 circular black balls with coloration lasting only seconds. One episode associated with hypoglycemia. Although these could be presyncopal episodes, concern would be for a posterior circulation TIA. Has a history of A-Fib, but more recent Holter no significant episodes and according to patient aslo had carotid studies that were unremarkable. Started an ASA per day and no episodes since. Thus to be complete recommend that Dr Lombard precede with Cardiac Echo to rule out valvular disease or right to left shunt. Recommended continuing on the ASA and if episodes return would need to consider MRI/ MRA looking for other changes or posterior circulation disease and refer to stroke neurologist.   6/29/2021 - She called this morning stating that she had been outside, and when she went back into the house, she had an episode with the dark spots, she tested her blood sugar, and it was low (58).  She took 2 glucose tabs and it resolved the issue.  Advised to let her primary care physician know.   9/28/2021 - no episodes of vision spots since last visit. According to patint no tests since June. Retinal vasculature normal, no areas of old plaques.   3/14/2022 - Recent episode of the central blob in both eyes, was present with the eye closed with some coloration surround. Lasted about a minute and occurred when finished walking outside. Then had lunch was sitting down and when got up to stand around 4 pm felt dizzy and her blood pressure was low. Recent 24 hr Holter by Dr Lombard at [facility] demonstrated paroxysmal SVT. Exam demonstrates tortuous retinal vasculature, but no plaques and normal optic nerve OCT at 89 OD and 93 OS. Thus concern is that she had a posterior circulation TIA. She states that the ASA was discontinued because of bruising. Her only bp med is verapamil which she takes at breakfast and dinner. Thus will send urgent referral to stroke neurology,  order MRI with cole, MRA head and neck. Will send note as well to Dr Lombard. Disucssed that if these return and become more frequent to go to ER

## 2022-03-14 NOTE — PROGRESS NOTES
"Peds/Neuro Ophthalmology:   Zachary Spangler M.D.    Date & Time note created:    3/14/2022   2:57 PM     Referring MD / APRN:  J Timothy Lombard, M.D., No att. providers found    Patient ID:  Name:             Indy Patel   YOB: 1940  Age:                 81 y.o.  female   MRN:               [id number]    Chief Complaint/Reason for Visit:     Other (New Transient vision disturbance of both eyes x 3 days ago )      History of Present Illness:    Indy Patel is a 81 y.o. female   Pt is here for new Transient vision disturbance of both eyes that happened 3 days ago. Pt states vision is back to normal in both eye after she saw black spots in the center of vision. Pt states the black spots where in the center and the did not move was only able to see off to the sides. This episode lasted only one minute. Pt denies pain or headache after or during episode. Pt check her blood pressure after this happened and it was really low.       Review of Systems:  Review of Systems   Eyes:        Transient vision disturbance of both eyes   All other systems reviewed and are negative.      Past Medical History:   Past Medical History:   Diagnosis Date   • CATARACT     surgically corrected left   • Colonic polyps    • GERD (gastroesophageal reflux disease)    • Glucose intolerance     \"reactive hypoglycemia\"   • Heart burn     gerd   • Heart murmur    • History of fundoplication 2001   • Indigestion    • IPMN (intraductal papillary mucinous neoplasm) 2007    dx @ [facility] / Dr. Palmer doubts   • Osteopenia    • Other specified disorder of intestines     diarrhea, steatorrhea   • Pancreatitis chronic     ?   • Personal history of venous thrombosis and embolism 2001, 1991    leg   • Reactive hypoglycemia    • S/P appendectomy 1991   • S/P cholecystectomy 2004   • S/P hysterectomy with oophorectomy 1990   • Superficial phlebitis of leg        Past Surgical History:  Past Surgical History: "   Procedure Laterality Date   • GASTROSCOPY WITH BIOPSY  1/23/2012    Performed by KIM KIM at [facility]   • EGD W/ENDOSCOPIC ULTRASOUND  1/23/2012    Performed by KIM KIM at [facility]   • ERCP W/SPHINCTEROTOMY/PAPILL.  6/7/2011    Performed by KIM KIM at [facility]   • GASTROSCOPY WITH BIOPSY  11/18/2010    Performed by KIM KIM at [facility]   • EGD W/ENDOSCOPIC ULTRASOUND  11/18/2010    Performed by KIM KMI at [facility]   • CATARACT EXTRACTION WITH IOL  11/2010    left   • EGD W/ENDOSCOPIC ULTRASOUND  10/21/2009    Performed by KIM KIM at [facility]   • GASTROSCOPY WITH BIOPSY  10/21/2009    Performed by KIM KIM at [facility]   • GASTROSCOPY WITH BIOPSY  4/25/08    Performed by KIM KIM at [facility]   • EGD W/ENDOSCOPIC ULTRASOUND  4/25/08    Performed by KIM KIM at [facility]   • NISSEN FUNDOPLICATION LAPAROSCOPIC  2003    redo   • NISSEN FUNDOPLICATION LAPAROSCOPIC  2002   • APPENDECTOMY  1991   • GYN SURGERY      hysterectomy, bilateral salpingo-oophorectomies   • OTHER ABDOMINAL SURGERY      cholecystectomy, Nissen fundoplication with Toupet reoperation, appendectomy   • OTHER ORTHOPEDIC SURGERY      ankle surgery x3       Current Outpatient Medications:  Current Outpatient Medications   Medication Sig Dispense Refill   • cholestyramine (QUESTRAN) 4 g packet PUT 1 SCOOP IN AT LEAST 4 TO 6 OUNCES OF WATER OR JUICE, MIX THEN DRINK DAILY     • Calcium Carbonate-Vitamin D (CALCIUM-VITAMIN D) 600-125 MG-UNIT Tab Take 1 Tablet by mouth every day.     • verapamil (ISOPTIN) 120 MG Tab Take 120 mg by mouth every day.     • alendronate (FOSAMAX) 70 MG Tab Take 70 mg by mouth every 7 days. On Monday     • Vitamin E 180 MG Cap Take 180 mg by mouth every day.     • mesalamine (LIALDA) 1.2 GM Tablet Delayed Response Take 1.2 g  by mouth every morning with breakfast.     • Cyanocobalamin (B-12) 1000 MCG Tab Take 1,000 mcg by mouth every day.     • Probiotic Product (PROBIOTIC DAILY PO) Take 1 Capsule by mouth every day.     • traMADol (ULTRAM) 50 MG Tab Take 50 mg by mouth every four hours as needed. (Patient not taking: Reported on 3/14/2022)     • digoxin (LANOXIN) 125 MCG Tab Take 125 mcg by mouth every day. (Patient not taking: Reported on 3/14/2022)     • DILTIAZem CD (CARDIZEM CD) 120 MG CAPSULE SR 24 HR Take 120 mg by mouth every evening. (Patient not taking: No sig reported)       No current facility-administered medications for this visit.       Allergies:  Allergies   Allergen Reactions   • Amitriptyline Unspecified     Dry mouth tremor  Imipramine     Dry mouth tremor  Imipramine     Dry mouth tremor  Imipramine        • Boniva [Ibandronate Sodium] Unspecified     Severe pain   • Carvedilol Vomiting   • Ciprofloxacin Hives   • Gabapentin Unspecified     ??? Side effect  ??? Side effect     • Gluten Meal Unspecified   • Metformin Unspecified     Blood glucose dropped lower  Blood glucose dropped lower  Blood glucose dropped lower     • Metoclopramide Hcl Unspecified     Reglan  Reglan     • Metoprolol Vomiting   • Ondansetron Unspecified     Possible atrial fibrillation   • Oxycodone-Acetaminophen Unspecified and Vomiting   • Paroxetine Hcl Unspecified     Nervous insomnia  Nervous insomnia     • Prolia [Denosumab]      Severe pain   • Reglan [Metoclopramide Hcl] Shortness of Breath   • Vicodin [Hydrocodone-Acetaminophen] Vomiting and Nausea       Family History:  Family History   Problem Relation Age of Onset   • Diabetes Other    • Heart Disease Other    • Hypertension Other    • Cancer Other    • Glasses Mother    • Glasses Father    • Heart Disease Father        Social History:  Social History     Socioeconomic History   • Marital status:      Spouse name: Not on file   • Number of children: Not on file   • Years of  education: Not on file   • Highest education level: Not on file   Occupational History   • Not on file   Tobacco Use   • Smoking status: Never Smoker   • Smokeless tobacco: Never Used   Vaping Use   • Vaping Use: Not on file   Substance and Sexual Activity   • Alcohol use: No     Comment: once a month   • Drug use: No   • Sexual activity: Not on file   Other Topics Concern   • Not on file   Social History Narrative    Retired     Social Determinants of Health     Financial Resource Strain: Not on file   Food Insecurity: Not on file   Transportation Needs: Not on file   Physical Activity: Not on file   Stress: Not on file   Social Connections: Not on file   Intimate Partner Violence: Not on file   Housing Stability: Not on file          Physical Exam:  Physical Exam    Oriented x 3  Weight/BMI: There is no height or weight on file to calculate BMI.  There were no vitals taken for this visit.    Base Eye Exam     Visual Acuity (Snellen - Linear)       Right Left    Dist sc 20/20 20/25    Dist ph sc NI NI          Tonometry (I-care, 1:13 PM)       Right Left    Pressure 14 15          Pupils       Pupils    Right PERRL    Left PERRL          Visual Fields       Right Left     Full Full          Neuro/Psych     Oriented x3: Yes    Mood/Affect: Normal          Dilation     able to view wihtout dilation             Slit Lamp and Fundus Exam     External Exam       Right Left    External Normal Normal          Slit Lamp Exam       Right Left    Lids/Lashes Normal Normal    Conjunctiva/Sclera White and quiet White and quiet    Cornea Clear Clear    Anterior Chamber Deep and quiet Deep and quiet    Iris Round and reactive Round and reactive    Lens Posterior chamber intraocular lens Posterior chamber intraocular lens, Posterior capsular opacification    Vitreous Normal Normal          Fundus Exam       Right Left    Disc Normal Normal    C/D Ratio 0.3 0.2    Macula Normal Normal    Vessels Normal Normal    Periphery Normal  Normal            Refraction     Manifest Refraction (Auto)       Sphere Cylinder Axis    Right -1.25 +1.00 175    Left +0.00 +0.50 174                Pertinent Lab/Test/Imaging Review:      Assessment and Plan:     Transient vision disturbance of both eyes  6/3/2021 - Episode of transient vision change described as 3 circular black balls with coloration lasting only seconds. One episode associated with hypoglycemia. Although these could be presyncopal episodes, concern would be for a posterior circulation TIA. Has a history of A-Fib, but more recent Holter no significant episodes and according to patient aslo had carotid studies that were unremarkable. Started an ASA per day and no episodes since. Thus to be complete recommend that Dr Lombard precede with Cardiac Echo to rule out valvular disease or right to left shunt. Recommended continuing on the ASA and if episodes return would need to consider MRI/ MRA looking for other changes or posterior circulation disease and refer to stroke neurologist.   6/29/2021 - She called this morning stating that she had been outside, and when she went back into the house, she had an episode with the dark spots, she tested her blood sugar, and it was low (58).  She took 2 glucose tabs and it resolved the issue.  Advised to let her primary care physician know.   9/28/2021 - no episodes of vision spots since last visit. According to patint no tests since June. Retinal vasculature normal, no areas of old plaques.   3/14/2022 - Recent episode of the central blob in both eyes, was present with the eye closed with some coloration surround. Lasted about a minute and occurred when finished walking outside. Then had lunch was sitting down and when got up to stand around 4 pm felt dizzy and her blood pressure was low. Recent 24 hr Holter by Dr Lombard at [facility] demonstrated paroxysmal SVT. Exam demonstrates tortuous retinal vasculature, but no plaques and normal optic nerve OCT at 89  OD and 93 OS. Thus concern is that she had a posterior circulation TIA. She states that the ASA was discontinued because of bruising. Her only bp med is verapamil which she takes at breakfast and dinner. Thus will send urgent referral to stroke neurology, order MRI with cole, MRA head and neck. Will send note as well to Dr Lombard. Disucssed that if these return and become more frequent to go to [facility] Spangler M.D.

## 2022-03-15 DIAGNOSIS — I63.333 CEREBROVASCULAR ACCIDENT (CVA) DUE TO BILATERAL THROMBOSIS OF POSTERIOR CEREBRAL ARTERIES (HCC): ICD-10-CM

## 2022-03-17 ENCOUNTER — HOSPITAL ENCOUNTER (OUTPATIENT)
Dept: LAB | Facility: MEDICAL CENTER | Age: 82
End: 2022-03-17
Attending: OPHTHALMOLOGY
Payer: MEDICARE

## 2022-03-17 DIAGNOSIS — H53.9 TRANSIENT VISION DISTURBANCE OF BOTH EYES: ICD-10-CM

## 2022-03-17 LAB
ANION GAP SERPL CALC-SCNC: 8 MMOL/L (ref 7–16)
BUN SERPL-MCNC: 15 MG/DL (ref 8–22)
CALCIUM SERPL-MCNC: 9 MG/DL (ref 8.5–10.5)
CHLORIDE SERPL-SCNC: 105 MMOL/L (ref 96–112)
CO2 SERPL-SCNC: 29 MMOL/L (ref 20–33)
CREAT SERPL-MCNC: 0.7 MG/DL (ref 0.5–1.4)
CRP SERPL HS-MCNC: <0.3 MG/DL (ref 0–0.75)
ERYTHROCYTE [SEDIMENTATION RATE] IN BLOOD BY WESTERGREN METHOD: 5 MM/HOUR (ref 0–25)
GFR SERPLBLD CREATININE-BSD FMLA CKD-EPI: 86 ML/MIN/1.73 M 2
GLUCOSE SERPL-MCNC: 85 MG/DL (ref 65–99)
POTASSIUM SERPL-SCNC: 4.8 MMOL/L (ref 3.6–5.5)
SODIUM SERPL-SCNC: 142 MMOL/L (ref 135–145)

## 2022-03-17 PROCEDURE — 86140 C-REACTIVE PROTEIN: CPT

## 2022-03-17 PROCEDURE — 85652 RBC SED RATE AUTOMATED: CPT

## 2022-03-17 PROCEDURE — 80048 BASIC METABOLIC PNL TOTAL CA: CPT

## 2022-03-17 PROCEDURE — 36415 COLL VENOUS BLD VENIPUNCTURE: CPT

## 2022-03-18 ENCOUNTER — OFFICE VISIT (OUTPATIENT)
Dept: NEUROLOGY | Facility: MEDICAL CENTER | Age: 82
End: 2022-03-18
Attending: PSYCHIATRY & NEUROLOGY
Payer: MEDICARE

## 2022-03-18 VITALS
SYSTOLIC BLOOD PRESSURE: 120 MMHG | TEMPERATURE: 97.2 F | WEIGHT: 102.95 LBS | HEIGHT: 63 IN | HEART RATE: 64 BPM | DIASTOLIC BLOOD PRESSURE: 70 MMHG | OXYGEN SATURATION: 93 % | BODY MASS INDEX: 18.24 KG/M2

## 2022-03-18 DIAGNOSIS — H53.9 TRANSIENT VISION DISTURBANCE OF BOTH EYES: ICD-10-CM

## 2022-03-18 PROBLEM — G43.109 MIGRAINE WITH AURA AND WITHOUT STATUS MIGRAINOSUS, NOT INTRACTABLE: Status: ACTIVE | Noted: 2019-09-12

## 2022-03-18 PROBLEM — H81.11 BENIGN PAROXYSMAL POSITIONAL VERTIGO OF RIGHT EAR: Status: ACTIVE | Noted: 2022-03-18

## 2022-03-18 PROBLEM — E16.1 REACTIVE HYPOGLYCEMIA: Status: ACTIVE | Noted: 2017-08-09

## 2022-03-18 PROBLEM — K57.92 DIVERTICULITIS: Status: ACTIVE | Noted: 2018-11-02

## 2022-03-18 PROBLEM — Z23 NEED FOR VACCINATION FOR H FLU TYPE B: Status: ACTIVE | Noted: 2018-10-11

## 2022-03-18 PROBLEM — R11.2 INTRACTABLE NAUSEA AND VOMITING: Status: ACTIVE | Noted: 2018-10-02

## 2022-03-18 PROBLEM — I34.0 NON-RHEUMATIC MITRAL REGURGITATION: Status: ACTIVE | Noted: 2019-08-09

## 2022-03-18 PROBLEM — M85.89 OSTEOPENIA OF MULTIPLE SITES: Status: ACTIVE | Noted: 2017-04-13

## 2022-03-18 PROBLEM — K11.5: Status: ACTIVE | Noted: 2020-08-14

## 2022-03-18 PROBLEM — G25.0 ESSENTIAL TREMOR: Status: ACTIVE | Noted: 2019-03-14

## 2022-03-18 PROBLEM — Z90.49 S/P CHOLECYSTECTOMY: Status: ACTIVE | Noted: 2018-02-06

## 2022-03-18 PROBLEM — I10 ESSENTIAL HYPERTENSION: Status: ACTIVE | Noted: 2020-02-19

## 2022-03-18 PROBLEM — I48.0 PAROXYSMAL ATRIAL FIBRILLATION (HCC): Status: ACTIVE | Noted: 2018-10-02

## 2022-03-18 PROCEDURE — 99212 OFFICE O/P EST SF 10 MIN: CPT | Performed by: PSYCHIATRY & NEUROLOGY

## 2022-03-18 PROCEDURE — 99204 OFFICE O/P NEW MOD 45 MIN: CPT | Performed by: PSYCHIATRY & NEUROLOGY

## 2022-03-18 RX ORDER — ACETAMINOPHEN 500 MG
TABLET ORAL DAILY
COMMUNITY
End: 2023-10-23

## 2022-03-18 RX ORDER — LORAZEPAM 0.5 MG/1
TABLET ORAL
COMMUNITY
Start: 2022-01-25 | End: 2023-10-23

## 2022-03-18 ASSESSMENT — PATIENT HEALTH QUESTIONNAIRE - PHQ9: CLINICAL INTERPRETATION OF PHQ2 SCORE: 0

## 2022-03-18 ASSESSMENT — FIBROSIS 4 INDEX: FIB4 SCORE: 1.81

## 2022-03-18 NOTE — ASSESSMENT & PLAN NOTE
6/3/2021 - Episode of transient vision change described as 3 circular black balls with coloration lasting only seconds. One episode associated with hypoglycemia. Although these could be presyncopal episodes, concern would be for a posterior circulation TIA. Has a history of A-Fib, but more recent Holter no significant episodes and according to patient aslo had carotid studies that were unremarkable. Started an ASA per day and no episodes since. Thus to be complete recommend that Dr Lombard precede with Cardiac Echo to rule out valvular disease or right to left shunt. Recommended continuing on the ASA and if episodes return would need to consider MRI/ MRA looking for other changes or posterior circulation disease and refer to stroke neurologist.   6/29/2021 - She called this morning stating that she had been outside, and when she went back into the house, she had an episode with the dark spots, she tested her blood sugar, and it was low (58).  She took 2 glucose tabs and it resolved the issue.  Advised to let her primary care physician know.   9/28/2021 - no episodes of vision spots since last visit. According to patint no tests since June. Retinal vasculature normal, no areas of old plaques.   3/14/2022 - Recent episode of the central blob in both eyes, was present with the eye closed with some coloration surround. Lasted about a minute and occurred when finished walking outside. Then had lunch was sitting down and when got up to stand around 4 pm felt dizzy and her blood pressure was low. Recent 24 hr Holter by Dr Lombard at [facility] demonstrated paroxysmal SVT. Exam demonstrates tortuous retinal vasculature, but no plaques and normal optic nerve OCT at 89 OD and 93 OS. Thus concern is that she had a posterior circulation TIA. She states that the ASA was discontinued because of bruising. Her only bp med is verapamil which she takes at breakfast and dinner. Thus will send urgent referral to stroke neurology,  order MRI with cole, MRA head and neck. Will send note as well to Dr Lombard. Disucssed that if these return and become more frequent to go to ER  3/18/2022- ESR 5 CRP <0.3

## 2022-03-18 NOTE — PROGRESS NOTES
"[facility] NEUROLOGY  GENERAL NEUROLOGY  NEW PATIENT VISIT    Referral source: Zachary Spangler MD    CC: \"transienet vision disturbance of both eyes\"    HISTORY OF ILLNESS:  Indy Patel is a 81 y.o. woman with a history most notable for migraine, cataracts, HTN, and AF (not recently).  Today, she was accompanied by a friend (Skylar), and she provided the following history:    2021:  Indy first developed visual symptoms about 1 year ago.  She entered the house from the back yard.  She \"saw this thing\" in her visual field.\"  She saw three black spots.  The disturbance was present when each eye was closed independently.  The disturbance lasted 45-60 seconds.  Afterward her vision returned to normal.  There was no associated headaches.    6/2021:  There was a second episode essentially identical to the first.    3/11/2022:  There was a third event essentially identical to the first two.    Both the first and the third event occurred immediately after Indy stepped inside her house (after being outside).  She doesn't recall where she was/what she was doing at the time of the second event.    The following is a summary of headache symptoms, presented in my standard format:    Family History:   Age at onset: 39 (menopause)  Location: normal headaches: frontal, migraines: occipital  Radiation: no  Frequency: 3/year  Duration: normal: \"not long,\" migraines: uncertain (probalby not long)  Headache Days/Month: 0-1  Quality: \"dull\"  Intensity: 4/10  Aura: visual (kaleidoscope)  Photophobia/Phonophobia/Nausea/Vomiting: no/no/no/no  Provoked by Physical Activity?:   Triggers: no  Associated Symptoms:   Autonomic Signs (such as ptosis, miosis, conjunctival injection, rhinorrhea, increased lacrimation):   Head Trauma:   Association with Menses: menopausal  ED Visits:   Hospitalizations:   Missed Work Days:   Sleep: 6-7 hours/night  Caffeine Intake: none  Hydration: tries to keep well-hydrated  Nutrition: skips lunch " "when she isn't hungry  Exercise:   Analgesic Overuse:     Current Medication Regimen:  - acetaminophen: effective    Medications Tried: Response  Preventive:  -     Abortive:  -     Medications Not Tried:  -     MEDICAL AND SURGICAL HISTORY:  Past Medical History:   Diagnosis Date   • CATARACT     surgically corrected left   • Colonic polyps    • GERD (gastroesophageal reflux disease)    • Glucose intolerance     \"reactive hypoglycemia\"   • Heart burn     gerd   • Heart murmur    • History of fundoplication 2001   • Indigestion    • IPMN (intraductal papillary mucinous neoplasm) 2007    dx @ [facility] / Dr. Kim doubts   • Osteopenia    • Other specified disorder of intestines     diarrhea, steatorrhea   • Pancreatitis chronic     ?   • Personal history of venous thrombosis and embolism 2001, 1991    leg   • Reactive hypoglycemia    • S/P appendectomy 1991   • S/P cholecystectomy 2004   • S/P hysterectomy with oophorectomy 1990   • Superficial phlebitis of leg      Past Surgical History:   Procedure Laterality Date   • GASTROSCOPY WITH BIOPSY  1/23/2012    Performed by KIM KIM at [facility]   • EGD W/ENDOSCOPIC ULTRASOUND  1/23/2012    Performed by KIM KIM at [facility]   • ERCP W/SPHINCTEROTOMY/PAPILL.  6/7/2011    Performed by KIM KIM at [facility]   • GASTROSCOPY WITH BIOPSY  11/18/2010    Performed by KIM KIM at [facility]   • EGD W/ENDOSCOPIC ULTRASOUND  11/18/2010    Performed by KIM KIM at [facility]   • CATARACT EXTRACTION WITH IOL  11/2010    left   • EGD W/ENDOSCOPIC ULTRASOUND  10/21/2009    Performed by KIM KIM at [facility]   • GASTROSCOPY WITH BIOPSY  10/21/2009    Performed by KIM KIM at [facility]   • GASTROSCOPY WITH BIOPSY  4/25/08    Performed by KIM KIM at [facility]   • EGD W/ENDOSCOPIC ULTRASOUND  4/25/08    Performed " by KIM KIM at [facility]   • NISSEN FUNDOPLICATION LAPAROSCOPIC  2003    redo   • NISSEN FUNDOPLICATION LAPAROSCOPIC  2002   • APPENDECTOMY  1991   • GYN SURGERY      hysterectomy, bilateral salpingo-oophorectomies   • OTHER ABDOMINAL SURGERY      cholecystectomy, Nissen fundoplication with Toupet reoperation, appendectomy   • OTHER ORTHOPEDIC SURGERY      ankle surgery x3     MEDICATIONS:  Current Outpatient Medications   Medication Sig   • Probiotic, Lactobacillus, Cap Take  by mouth every day.   • LORazepam (ATIVAN) 0.5 MG Tab TAKE 1 TABLET BY MOUTH EVERY 6 HOURS AS NEEDED FOR ANXIETY 30 DAY SUPPLY**LAST SEEN 03/22/01**   • cholestyramine (QUESTRAN) 4 g packet PUT 1 SCOOP IN AT LEAST 4 TO 6 OUNCES OF WATER OR JUICE, MIX THEN DRINK DAILY   • traMADol (ULTRAM) 50 MG Tab Take 50 mg by mouth every four hours as needed.   • Calcium Carbonate-Vitamin D (CALCIUM-VITAMIN D) 600-125 MG-UNIT Tab Take 1 Tablet by mouth every day.   • verapamil (ISOPTIN) 120 MG Tab Take 120 mg by mouth 2 times a day.   • alendronate (FOSAMAX) 70 MG Tab Take 70 mg by mouth every 7 days. On Monday   • Vitamin E 180 MG Cap Take 180 mg by mouth every day.   • mesalamine (LIALDA) 1.2 GM Tablet Delayed Response Take 1.2 g by mouth every morning with breakfast.   • Cyanocobalamin (B-12) 1000 MCG Tab Take 1,000 mcg by mouth every day.   • Probiotic Product (PROBIOTIC DAILY PO) Take 1 Capsule by mouth every day.     SOCIAL HISTORY:  Social History     Tobacco Use   • Smoking status: Never Smoker   • Smokeless tobacco: Never Used   Substance Use Topics   • Alcohol use: No     Comment: once a month     Social History     Social History Narrative    Retired     FAMILY HISTORY:  Family History   Problem Relation Age of Onset   • Diabetes Other    • Heart Disease Other    • Hypertension Other    • Cancer Other    • Glasses Mother    • Glasses Father    • Heart Disease Father      REVIEW OF SYSTEMS:  A ROS was completed.  Pertinent  positives and negatives were included in the HPI, above.  All other systems were reviewed and are negative.    PHYSICAL EXAM:  General/Medical:  - NAD  - hair, skin, nails, and joints were normal    Neuro:  MENTAL STATUS: awake and alert; no deficits of speech or language; oriented to person, place, and time; affect was appropriate to situation; pleasant, cooperative    CRANIAL NERVES:    II: acuity: J2-1/J5-1, fields: intact to confrontation, pupils: 3/3 to 2/2 without a relative afferent pupillary defect, discs: sharp    III/IV/VI: versions: intact without nystagmus    V: facial sensation: symmetric to light touch    VII: facial expression: symmetric    VIII: hearing: intact to finger rub    IX/X: palate: elevates symmetrically    XI: shoulder shrug: symmetric    XII: tongue: midline    MOTOR:  - bulk: normal throughout  - tone: normal in the upper extremities  Upper Extremity Strength  (R/L)    5/5   Elbow flexion 5/5   Elbow extension 5/5   Shoulder abduction 5/5     Lower Extremity Strength  (R/L)   Hip flexion 5/5   Knee extension 5/5   Knee flexion 5/5   Ankle plantarflexion 5/5   Ankle dorsiflexion 5/5     - can walk on toes and heels  - pronator drift: absent  - abnormal movements: none    SENSATION:  - light touch: grossly intact over the upper and lower extremities  - vibration (R/L, seconds): NT/NT at the great toes  - pinprick: NT  - proprioception: NT  - Romberg: absent    COORDINATION:  - finger to nose: normal, no ataxia on exam  - finger tapping: rapid and accurate, bilaterally    REFLEXES:  Reflex Right Left   BR 2+ 2+   Biceps 2+ 2+   Triceps 2+ 2+   Patellae 2+ 2+   Achilles 2+ 2+   Toes down down     GAIT:  - narrow base and normal  - heel-raised/toe-raised gait: intact/intact  - tandem gait: intact    REVIEW OF IMAGING STUDIES:  No brain imaging available for review.    REVIEW OF LABORATORY STUDIES:  3/17/2022:  - BMP: WNL    ASSESSMENT:  Indy Patel is a 81 y.o. woman with  "possible migraine aura and a history otherwise notable for migraine, cataracts, HTN, and AF (not recently).  Differential diagnosis includes stroke/TIA (seems unlikely given the brief nature of symptoms and the inter-episode similarity), seizure (seems unlikely given absence of seizure history and no other symptoms), and migraine (probably most likely given history of symptoms and brief nature of events).  Still, given Indy's age and cardiovascular risk factors (history of AF, HTN), I agree with additional workup including MRI brain and vessel imaging of the head and neck.  Indy will have completed these studies on Monday.  If imaging provides another explanation I will reach out to her.    PLAN:  Likely Migraine Aura:  - agree with additional workup including MRI brain and vessel imaging of the head and neck    Follow-Up:  - No follow-ups on file.    Signed: Mark Strauss M.D.    BILLING DOCUMENTATION:   I spent 55 minutes reviewing the medical record, interviewing and examining the patient, discussing my impression (see \"assessment\" above), and coordinating care.  "

## 2022-03-19 ENCOUNTER — HOSPITAL ENCOUNTER (OUTPATIENT)
Dept: RADIOLOGY | Facility: MEDICAL CENTER | Age: 82
End: 2022-03-19
Attending: OPHTHALMOLOGY
Payer: MEDICARE

## 2022-03-19 DIAGNOSIS — H53.9 TRANSIENT VISION DISTURBANCE OF BOTH EYES: ICD-10-CM

## 2022-03-19 PROCEDURE — 70544 MR ANGIOGRAPHY HEAD W/O DYE: CPT | Mod: MG

## 2022-03-19 PROCEDURE — 70547 MR ANGIOGRAPHY NECK W/O DYE: CPT | Mod: ME

## 2022-03-21 ENCOUNTER — DOCUMENTATION (OUTPATIENT)
Dept: OPHTHALMOLOGY | Facility: MEDICAL CENTER | Age: 82
End: 2022-03-21
Payer: MEDICARE

## 2022-03-21 ENCOUNTER — HOSPITAL ENCOUNTER (OUTPATIENT)
Dept: RADIOLOGY | Facility: MEDICAL CENTER | Age: 82
End: 2022-03-21
Attending: OPHTHALMOLOGY
Payer: MEDICARE

## 2022-03-21 DIAGNOSIS — H53.9 TRANSIENT VISION DISTURBANCE OF BOTH EYES: ICD-10-CM

## 2022-03-21 DIAGNOSIS — I63.333 CEREBROVASCULAR ACCIDENT (CVA) DUE TO BILATERAL THROMBOSIS OF POSTERIOR CEREBRAL ARTERIES (HCC): ICD-10-CM

## 2022-03-21 PROCEDURE — A9576 INJ PROHANCE MULTIPACK: HCPCS | Performed by: OPHTHALMOLOGY

## 2022-03-21 PROCEDURE — 700117 HCHG RX CONTRAST REV CODE 255: Performed by: OPHTHALMOLOGY

## 2022-03-21 PROCEDURE — 70553 MRI BRAIN STEM W/O & W/DYE: CPT | Mod: MG

## 2022-03-21 RX ADMIN — GADOTERIDOL 10 ML: 279.3 INJECTION, SOLUTION INTRAVENOUS at 14:17

## 2022-03-21 NOTE — PROGRESS NOTES
6/3/2021 - Episode of transient vision change described as 3 circular black balls with coloration lasting only seconds. One episode associated with hypoglycemia. Although these could be presyncopal episodes, concern would be for a posterior circulation TIA. Has a history of A-Fib, but more recent Holter no significant episodes and according to patient aslo had carotid studies that were unremarkable. Started an ASA per day and no episodes since. Thus to be complete recommend that Dr Lombard precede with Cardiac Echo to rule out valvular disease or right to left shunt. Recommended continuing on the ASA and if episodes return would need to consider MRI/ MRA looking for other changes or posterior circulation disease and refer to stroke neurologist.   6/29/2021 - She called this morning stating that she had been outside, and when she went back into the house, she had an episode with the dark spots, she tested her blood sugar, and it was low (58).  She took 2 glucose tabs and it resolved the issue.  Advised to let her primary care physician know.   9/28/2021 - no episodes of vision spots since last visit. According to patint no tests since June. Retinal vasculature normal, no areas of old plaques.   3/14/2022 - Recent episode of the central blob in both eyes, was present with the eye closed with some coloration surround. Lasted about a minute and occurred when finished walking outside. Then had lunch was sitting down and when got up to stand around 4 pm felt dizzy and her blood pressure was low. Recent 24 hr Holter by Dr Lombard at [facility] demonstrated paroxysmal SVT. Exam demonstrates tortuous retinal vasculature, but no plaques and normal optic nerve OCT at 89 OD and 93 OS. Thus concern is that she had a posterior circulation TIA. She states that the ASA was discontinued because of bruising. Her only bp med is verapamil which she takes at breakfast and dinner. Thus will send urgent referral to stroke neurology,  order MRI with cole, MRA head and neck. Will send note as well to Dr Lombard. Disucssed that if these return and become more frequent to go to ER  3/18/2022- ESR 5 CRP <0.3  3/21/2022 - MRA head and neck normal

## 2022-03-21 NOTE — ASSESSMENT & PLAN NOTE
6/3/2021 - Episode of transient vision change described as 3 circular black balls with coloration lasting only seconds. One episode associated with hypoglycemia. Although these could be presyncopal episodes, concern would be for a posterior circulation TIA. Has a history of A-Fib, but more recent Holter no significant episodes and according to patient aslo had carotid studies that were unremarkable. Started an ASA per day and no episodes since. Thus to be complete recommend that Dr Lombard precede with Cardiac Echo to rule out valvular disease or right to left shunt. Recommended continuing on the ASA and if episodes return would need to consider MRI/ MRA looking for other changes or posterior circulation disease and refer to stroke neurologist.   6/29/2021 - She called this morning stating that she had been outside, and when she went back into the house, she had an episode with the dark spots, she tested her blood sugar, and it was low (58).  She took 2 glucose tabs and it resolved the issue.  Advised to let her primary care physician know.   9/28/2021 - no episodes of vision spots since last visit. According to patint no tests since June. Retinal vasculature normal, no areas of old plaques.   3/14/2022 - Recent episode of the central blob in both eyes, was present with the eye closed with some coloration surround. Lasted about a minute and occurred when finished walking outside. Then had lunch was sitting down and when got up to stand around 4 pm felt dizzy and her blood pressure was low. Recent 24 hr Holter by Dr Lombard at [facility] demonstrated paroxysmal SVT. Exam demonstrates tortuous retinal vasculature, but no plaques and normal optic nerve OCT at 89 OD and 93 OS. Thus concern is that she had a posterior circulation TIA. She states that the ASA was discontinued because of bruising. Her only bp med is verapamil which she takes at breakfast and dinner. Thus will send urgent referral to stroke neurology,  order MRI with cole, MRA head and neck. Will send note as well to Dr Lombard. Disucssed that if these return and become more frequent to go to ER  3/18/2022- ESR 5 CRP <0.3  3/21/2022 - MRA head and neck normal

## 2022-03-21 NOTE — ASSESSMENT & PLAN NOTE
6/3/2021 - Episode of transient vision change described as 3 circular black balls with coloration lasting only seconds. One episode associated with hypoglycemia. Although these could be presyncopal episodes, concern would be for a posterior circulation TIA. Has a history of A-Fib, but more recent Holter no significant episodes and according to patient aslo had carotid studies that were unremarkable. Started an ASA per day and no episodes since. Thus to be complete recommend that Dr Lombard precede with Cardiac Echo to rule out valvular disease or right to left shunt. Recommended continuing on the ASA and if episodes return would need to consider MRI/ MRA looking for other changes or posterior circulation disease and refer to stroke neurologist.   6/29/2021 - She called this morning stating that she had been outside, and when she went back into the house, she had an episode with the dark spots, she tested her blood sugar, and it was low (58).  She took 2 glucose tabs and it resolved the issue.  Advised to let her primary care physician know.   9/28/2021 - no episodes of vision spots since last visit. According to patint no tests since June. Retinal vasculature normal, no areas of old plaques.   3/14/2022 - Recent episode of the central blob in both eyes, was present with the eye closed with some coloration surround. Lasted about a minute and occurred when finished walking outside. Then had lunch was sitting down and when got up to stand around 4 pm felt dizzy and her blood pressure was low. Recent 24 hr Holter by Dr Lombard at [facility] demonstrated paroxysmal SVT. Exam demonstrates tortuous retinal vasculature, but no plaques and normal optic nerve OCT at 89 OD and 93 OS. Thus concern is that she had a posterior circulation TIA. She states that the ASA was discontinued because of bruising. Her only bp med is verapamil which she takes at breakfast and dinner. Thus will send urgent referral to stroke neurology,  order MRI with cole, MRA head and neck. Will send note as well to Dr Lombard. Disucssed that if these return and become more frequent to go to ER  3/18/2022- ESR 5 CRP <0.3  3/21/2022 - MRA head and neck normal. MRI no acute stroke

## 2022-04-11 ENCOUNTER — APPOINTMENT (OUTPATIENT)
Dept: MEDICAL GROUP | Facility: IMAGING CENTER | Age: 82
End: 2022-04-11

## 2022-04-14 ENCOUNTER — HOSPITAL ENCOUNTER (OUTPATIENT)
Dept: LAB | Facility: MEDICAL CENTER | Age: 82
End: 2022-04-14
Attending: INTERNAL MEDICINE
Payer: MEDICARE

## 2022-04-14 LAB
ALBUMIN SERPL BCP-MCNC: 4.3 G/DL (ref 3.2–4.9)
ALBUMIN/GLOB SERPL: 1.7 G/DL
ALP SERPL-CCNC: 57 U/L (ref 30–99)
ALT SERPL-CCNC: 24 U/L (ref 2–50)
ANION GAP SERPL CALC-SCNC: 11 MMOL/L (ref 7–16)
AST SERPL-CCNC: 28 U/L (ref 12–45)
BILIRUB SERPL-MCNC: 0.5 MG/DL (ref 0.1–1.5)
BUN SERPL-MCNC: 14 MG/DL (ref 8–22)
CALCIUM SERPL-MCNC: 9.6 MG/DL (ref 8.5–10.5)
CHLORIDE SERPL-SCNC: 103 MMOL/L (ref 96–112)
CO2 SERPL-SCNC: 24 MMOL/L (ref 20–33)
CREAT SERPL-MCNC: 0.8 MG/DL (ref 0.5–1.4)
GFR SERPLBLD CREATININE-BSD FMLA CKD-EPI: 73 ML/MIN/1.73 M 2
GLOBULIN SER CALC-MCNC: 2.5 G/DL (ref 1.9–3.5)
GLUCOSE SERPL-MCNC: 131 MG/DL (ref 65–99)
POTASSIUM SERPL-SCNC: 4.4 MMOL/L (ref 3.6–5.5)
PROT SERPL-MCNC: 6.8 G/DL (ref 6–8.2)
SODIUM SERPL-SCNC: 138 MMOL/L (ref 135–145)

## 2022-04-14 PROCEDURE — 36415 COLL VENOUS BLD VENIPUNCTURE: CPT | Mod: GY

## 2022-04-14 PROCEDURE — 80053 COMPREHEN METABOLIC PANEL: CPT | Mod: GY

## 2022-04-15 ENCOUNTER — HOSPITAL ENCOUNTER (OUTPATIENT)
Dept: RADIOLOGY | Facility: MEDICAL CENTER | Age: 82
End: 2022-04-15
Attending: INTERNAL MEDICINE
Payer: MEDICARE

## 2022-04-15 DIAGNOSIS — R05.9 COUGH: ICD-10-CM

## 2022-04-15 DIAGNOSIS — Z11.59 SCREENING FOR VIRAL DISEASE: ICD-10-CM

## 2022-04-15 PROCEDURE — 71046 X-RAY EXAM CHEST 2 VIEWS: CPT

## 2022-04-16 ENCOUNTER — HOSPITAL ENCOUNTER (OUTPATIENT)
Dept: LAB | Facility: MEDICAL CENTER | Age: 82
End: 2022-04-16
Attending: INTERNAL MEDICINE
Payer: MEDICARE

## 2022-04-16 LAB
BASOPHILS # BLD AUTO: 0.8 % (ref 0–1.8)
BASOPHILS # BLD: 0.04 K/UL (ref 0–0.12)
EOSINOPHIL # BLD AUTO: 0.08 K/UL (ref 0–0.51)
EOSINOPHIL NFR BLD: 1.7 % (ref 0–6.9)
ERYTHROCYTE [DISTWIDTH] IN BLOOD BY AUTOMATED COUNT: 40.1 FL (ref 35.9–50)
ERYTHROCYTE [SEDIMENTATION RATE] IN BLOOD BY WESTERGREN METHOD: 7 MM/HOUR (ref 0–25)
HCT VFR BLD AUTO: 45.5 % (ref 37–47)
HGB BLD-MCNC: 14.9 G/DL (ref 12–16)
IMM GRANULOCYTES # BLD AUTO: 0.02 K/UL (ref 0–0.11)
IMM GRANULOCYTES NFR BLD AUTO: 0.4 % (ref 0–0.9)
LYMPHOCYTES # BLD AUTO: 0.92 K/UL (ref 1–4.8)
LYMPHOCYTES NFR BLD: 19.3 % (ref 22–41)
MCH RBC QN AUTO: 31.2 PG (ref 27–33)
MCHC RBC AUTO-ENTMCNC: 32.7 G/DL (ref 33.6–35)
MCV RBC AUTO: 95.4 FL (ref 81.4–97.8)
MONOCYTES # BLD AUTO: 0.36 K/UL (ref 0–0.85)
MONOCYTES NFR BLD AUTO: 7.6 % (ref 0–13.4)
NEUTROPHILS # BLD AUTO: 3.34 K/UL (ref 2–7.15)
NEUTROPHILS NFR BLD: 70.2 % (ref 44–72)
NRBC # BLD AUTO: 0 K/UL
NRBC BLD-RTO: 0 /100 WBC
PLATELET # BLD AUTO: 281 K/UL (ref 164–446)
PMV BLD AUTO: 10.5 FL (ref 9–12.9)
RBC # BLD AUTO: 4.77 M/UL (ref 4.2–5.4)
WBC # BLD AUTO: 4.8 K/UL (ref 4.8–10.8)

## 2022-04-16 PROCEDURE — 85025 COMPLETE CBC W/AUTO DIFF WBC: CPT

## 2022-04-16 PROCEDURE — 87040 BLOOD CULTURE FOR BACTERIA: CPT

## 2022-04-16 PROCEDURE — 36415 COLL VENOUS BLD VENIPUNCTURE: CPT

## 2022-04-16 PROCEDURE — 85652 RBC SED RATE AUTOMATED: CPT

## 2022-04-18 ENCOUNTER — APPOINTMENT (OUTPATIENT)
Dept: MEDICAL GROUP | Facility: IMAGING CENTER | Age: 82
End: 2022-04-18

## 2022-04-22 LAB
BACTERIA BLD CULT: NORMAL
SIGNIFICANT IND 70042: NORMAL
SITE SITE: NORMAL
SOURCE SOURCE: NORMAL

## 2022-05-09 ENCOUNTER — APPOINTMENT (OUTPATIENT)
Dept: MEDICAL GROUP | Facility: IMAGING CENTER | Age: 82
End: 2022-05-09

## 2022-11-09 ENCOUNTER — PATIENT MESSAGE (OUTPATIENT)
Dept: HEALTH INFORMATION MANAGEMENT | Facility: OTHER | Age: 82
End: 2022-11-09

## 2023-06-27 ENCOUNTER — APPOINTMENT (OUTPATIENT)
Dept: MEDICAL GROUP | Facility: LAB | Age: 83
End: 2023-06-27
Payer: MEDICARE

## 2023-10-23 ENCOUNTER — OFFICE VISIT (OUTPATIENT)
Dept: MEDICAL GROUP | Facility: LAB | Age: 83
End: 2023-10-23
Payer: MEDICARE

## 2023-10-23 VITALS
WEIGHT: 108 LBS | RESPIRATION RATE: 12 BRPM | HEIGHT: 63 IN | DIASTOLIC BLOOD PRESSURE: 72 MMHG | HEART RATE: 66 BPM | BODY MASS INDEX: 19.14 KG/M2 | TEMPERATURE: 97 F | SYSTOLIC BLOOD PRESSURE: 124 MMHG | OXYGEN SATURATION: 97 %

## 2023-10-23 DIAGNOSIS — Z12.31 ENCOUNTER FOR SCREENING MAMMOGRAM FOR MALIGNANT NEOPLASM OF BREAST: ICD-10-CM

## 2023-10-23 DIAGNOSIS — M81.0 AGE-RELATED OSTEOPOROSIS WITHOUT CURRENT PATHOLOGICAL FRACTURE: ICD-10-CM

## 2023-10-23 DIAGNOSIS — M25.462 PAIN AND SWELLING OF LEFT KNEE: ICD-10-CM

## 2023-10-23 DIAGNOSIS — E16.1 REACTIVE HYPOGLYCEMIA: ICD-10-CM

## 2023-10-23 DIAGNOSIS — K86.1 CHRONIC PANCREATITIS, UNSPECIFIED PANCREATITIS TYPE (HCC): ICD-10-CM

## 2023-10-23 DIAGNOSIS — C44.320 SQUAMOUS CELL CARCINOMA, FACE: ICD-10-CM

## 2023-10-23 DIAGNOSIS — I10 ESSENTIAL HYPERTENSION: ICD-10-CM

## 2023-10-23 DIAGNOSIS — M25.562 PAIN AND SWELLING OF LEFT KNEE: ICD-10-CM

## 2023-10-23 PROBLEM — R11.2 INTRACTABLE NAUSEA AND VOMITING: Status: RESOLVED | Noted: 2018-10-02 | Resolved: 2023-10-23

## 2023-10-23 PROBLEM — Z86.73 HISTORY OF TIA (TRANSIENT ISCHEMIC ATTACK) AND STROKE: Status: ACTIVE | Noted: 2022-03-21

## 2023-10-23 PROCEDURE — 3078F DIAST BP <80 MM HG: CPT | Performed by: FAMILY MEDICINE

## 2023-10-23 PROCEDURE — 3074F SYST BP LT 130 MM HG: CPT | Performed by: FAMILY MEDICINE

## 2023-10-23 PROCEDURE — 99214 OFFICE O/P EST MOD 30 MIN: CPT | Performed by: FAMILY MEDICINE

## 2023-10-23 RX ORDER — ASPIRIN 81 MG/1
81 TABLET ORAL DAILY
COMMUNITY
End: 2024-01-26

## 2023-10-23 ASSESSMENT — FIBROSIS 4 INDEX: FIB4 SCORE: 1.71

## 2023-10-23 ASSESSMENT — PATIENT HEALTH QUESTIONNAIRE - PHQ9: CLINICAL INTERPRETATION OF PHQ2 SCORE: 0

## 2023-10-23 NOTE — PROGRESS NOTES
Chief Complaint   Patient presents with    Establish Care         Indy Patel is a 83 y.o. female here to establish care and for evaluation and management of:        HPI:    New to our system and clinic.   Previously seeing [facility] for cardio and primary care. Her PCP is going to retire from the PCP aspect, but probably still do cardio.      Large past medical history.  History of gastric bypass in the past.  Also history of chronic pancreatitis, chronic diarrhea, PVCs, hypercholesterolemia, essential hypertension, essential tremor.   Has some increased blood sugars after eating.  Recent A1c was only 5.3.      CHOLESTEROL   100 - 200 mg/dL 181    TRIGLYCERIDE   0 - 150 mg/dL 91    HDL   45 - 100 mg/dL 81    LDL CALCULATED   See Comment mg/dL 82    NON-HDL CHOLESTEROL   See Comment mg/dL      Reactive hypoglycemia. Was thought to be insulinoma but this was negative. Was seen by [facility]. Carries glucose tabs and using freestyle maday for monitoring.   Usually after carb load but not always.   Has maday set up to alarm at 60.     Using fosamax for 4 years, then stopped, for about a year.     D3, and KR2 supplements.     Left knee pain:   Playing pickle ball regularly.   About a year ago started having medial knee pain. Had PRP and Visco injection with Dr Solano about 3 months ago.  Had more swelling, then drained. There was a thought about allergy to the visco. Then the swelling would keep returning.     They did send joint fluid for eval at [facility]. Results arent back yet.     H/o paroxysmal atrial fib. Converts on its own. Has some eliquis at home. Uses baby asa daily. Does have cardio access. Episodes are very infrequent.         Allergies   Allergen Reactions    Metoclopramide Shortness of Breath     Other reaction(s): Other (See Comments), Other (See Comments), Other (See Comments), Other (Specify with Comments), Unspecified  Memory loss  Reglan  Memory loss  Memory  loss  Reglan  Reglan  Reglan    Amitriptyline Unspecified     Dry mouth tremor  Imipramine     Dry mouth tremor  Imipramine     Dry mouth tremor  Imipramine       Other reaction(s): Other (See Comments), Other (Specify with Comments), Unspecified  Dry mouth tremor  Imipramine     Dry mouth tremor  Imipramine     Dry mouth tremor  Imipramine     Dry mouth tremor  Imipramine     Dry mouth tremor  Imipramine     Dry mouth tremor  Imipramine       Boniva [Ibandronate Sodium] Unspecified     Severe pain    Carvedilol Vomiting    Ciprofloxacin Hives and Unspecified     Other reaction(s): Other (See Comments)  Not sure why it is here    Denosumab      Severe pain  Other reaction(s): Other (See Comments), Other (Specify with Comments)  Severe prolonged myalgias  Severe pain throughout body.  Severe prolonged myalgias  Severe pain    Gabapentin Unspecified     ??? Side effect  ??? Side effect    Other reaction(s): Other (See Comments), Unspecified  ??? Side effect  ??? Side effect  ??? Side effect  ??? Side effect    Gluten Meal Unspecified    Hydrocodone-Acetaminophen Vomiting and Nausea    Ibandronic Acid      Other reaction(s): Unspecified  Severe pain  Other reaction(s): Other (See Comments), Other (Specify with Comments)  Myalgia  Myalgia  Myalgia      Metformin Unspecified     Blood glucose dropped lower  Blood glucose dropped lower  Blood glucose dropped lower    Other reaction(s): Other (See Comments), Other (See Comments), Other (Specify with Comments), Unspecified  Blood glucose dropped lower  Blood glucose dropped lower  Blood glucose dropped lower  Blood glucose dropped lower  Blood glucose dropped lowerBlood glucose dropped lower    Metoclopramide Hcl Unspecified     Reglan  Reglan      Metoprolol Vomiting    Ondansetron Unspecified     Possible atrial fibrillation  Other reaction(s): Arrhythmia  Possible atrial fibrillation  Other reaction(s): Unspecified  Possible atrial fibrillation       Oxycodone-Acetaminophen Unspecified and Vomiting     Other reaction(s): Dizziness  Other reaction(s): Other (See Comments), Unspecified    Paroxetine Hcl Unspecified     Nervous insomnia  Nervous insomnia    Other reaction(s): Other (See Comments), Unspecified  Nervous insomnia  Nervous insomnia  Nervous insomnia  Nervous insomnia    Reglan [Metoclopramide Hcl] Shortness of Breath    Vicodin [Hydrocodone-Acetaminophen] Vomiting and Nausea       Current medicines (including changes today)  Current Outpatient Medications   Medication Sig Dispense Refill    verapamil SR (CALAN SR) 120 MG CR tablet Take 120 mg by mouth 2 (two) times a day.      Continuous Blood Gluc Sensor (FREESTYLE AYUSH 2 SENSOR) [facility] [place] USE EVERY 2 WEEKS. USE TO MONITOR GLUCOSE CONTINUOUSLY REPLACE SENSOR EVERY 14 DAYS      Continuous Blood Gluc  (FREESTYLE AYUSH 2 READER) Device USE TO MONITOR GLUCOSE CONTINOUSLY      aspirin 81 MG EC tablet Take 81 mg by mouth every day.      Bimatoprost 0.03 % Solution APPLY 1 SOLUTION TOPICALLY ONTO EACH EYELID AT BEDTIME      Hyoscyamine Sulfate SL 0.125 MG SL Tab Place 0.125 mg under the tongue.      LORazepam (ATIVAN) 0.5 MG Tab Take 1 Tablet by mouth every 6 hours as needed.      traZODone (DESYREL) 50 MG Tab Take 1 Tablet by mouth at bedtime.      cholestyramine (QUESTRAN) 4 g packet PUT 1 SCOOP IN AT LEAST 4 TO 6 OUNCES OF WATER OR JUICE, MIX THEN DRINK DAILY      traMADol (ULTRAM) 50 MG Tab Take 50 mg by mouth every four hours as needed.      Calcium Carbonate-Vitamin D (CALCIUM-VITAMIN D) 600-125 MG-UNIT Tab Take 1 Tablet by mouth every day.      alendronate (FOSAMAX) 70 MG Tab Take 70 mg by mouth every 7 days. On Monday      Vitamin E 180 MG Cap Take 180 mg by mouth every day.      mesalamine (LIALDA) 1.2 GM Tablet Delayed Response Take 1.2 g by mouth every morning with breakfast.      Cyanocobalamin (B-12) 1000 MCG Tab Take 1,000 mcg by mouth every day.      Probiotic Product (PROBIOTIC DAILY PO)  Take 1 Capsule by mouth every day.       No current facility-administered medications for this visit.     She  has a past medical history of CATARACT, Colonic polyps, GERD (gastroesophageal reflux disease), Glucose intolerance, Heart burn, Heart murmur, History of fundoplication (2001), Indigestion, IPMN (intraductal papillary mucinous neoplasm) (2007), IPMN (intraductal papillary mucinous neoplasm) (8/18/2015), Migraine, Osteopenia, Other specified disorder of intestines, Pancreatitis chronic, Personal history of venous thrombosis and embolism (2001, 1991), Reactive hypoglycemia, S/P appendectomy (1991), S/P cholecystectomy (2004), S/P hysterectomy with oophorectomy (1990), and Superficial phlebitis of leg.    She has no past medical history of CAD (coronary artery disease), COPD, Liver disease, Psychiatric disorder, or Seizure disorder (HCC).  She  has a past surgical history that includes gastroscopy with biopsy (4/25/08); egd w/endoscopic ultrasound (4/25/08); other orthopedic surgery; other abdominal surgery; gyn surgery; appendectomy (1991); egd w/endoscopic ultrasound (10/21/2009); gastroscopy with biopsy (10/21/2009); gastroscopy with biopsy (11/18/2010); egd w/endoscopic ultrasound (11/18/2010); cataract extraction with iol (11/2010); nissen fundoplication laparoscopic (2002); nissen fundoplication laparoscopic (2003); ercp w/sphincterotomy/papill. (6/7/2011); gastroscopy with biopsy (1/23/2012); and egd w/endoscopic ultrasound (1/23/2012).  Social History     Tobacco Use    Smoking status: Never    Smokeless tobacco: Never   Substance Use Topics    Alcohol use: No     Comment: once a month    Drug use: No     Social History     Social History Narrative    Retired     Family History   Problem Relation Age of Onset    Glasses Mother     Cancer Father         lung, smoker    Glasses Father     Heart Disease Father     Diabetes Paternal Aunt     Cancer Paternal Grandmother         breast cancer    Diabetes  "Paternal Grandfather     Diabetes Other     Heart Disease Other     Hypertension Other     Cancer Other      Family Status   Relation Name Status    OTHER  (Not Specified)    OTHER  (Not Specified)    OTHER  (Not Specified)    OTHER  (Not Specified)    Mo      Fa           ROS  No fever or chills.  No nausea or vomiting.  No chest pain or palpitations.  No cough or SOB.  No pain with urination or hematuria.  No black or bloody stools.  All other systems reviewed and are negative. See above.      Objective:     /72 (BP Location: Left arm, Patient Position: Sitting, BP Cuff Size: Adult)   Pulse 66   Temp 36.1 °C (97 °F)   Resp 12   Ht 1.588 m (5' 2.5\")   Wt 49 kg (108 lb)   SpO2 97%  Body mass index is 19.44 kg/m².  Physical Exam:      Well developed, well nourished.  Alert, oriented in no acute distress.  Psych: Eye contact is good, speech goal directed, affect calm  Eyes: conjunctiva non-injected, sclera non-icteric.  Ears: Pinna normal. TM pearly gray.   Nose: Nares are patent.  Normal mucosa  Mouth: Oral mucous membranes pink and moist with no lesions.  Neck Supple.  No adenopathy or masses in the neck or supraclavicular regions. No thyromegaly  Lungs: clear to auscultation bilaterally with good excursion. No wheezes or rhonchi  CV: regular rate and rhythm. No murmur  Abdomen: soft, nontender, no masses or organomegaly.  No rebound or gaurding  Ext: no edema, color normal, vascularity normal, temperature normal      Assessment and Plan:   The following treatment plan was discussed    1. Age-related osteoporosis without current pathological fracture  She was on Fosamax for 4 years.  This was then stopped.  Her last bone density was .  She would be due next April.  Depending upon what her FRAX score is and what her bone density is out right we would consider restarting a bisphosphonate or even switching over to more of a bone building agent depending upon the results.    2. Essential " hypertension  This is fairly well controlled.  She should continue her current medications as is.    3. Chronic pancreatitis, unspecified pancreatitis type (HCC)  Reviewed the history of this.  She really does not have a lot of issues overall.  Every now and then she uses the tramadol for pain but has not been admitted or had any issues with this otherwise.    4. Squamous cell carcinoma, face  Remotely in the past.  Keeps an eye on skin issues now.    5. Encounter for screening mammogram for malignant neoplasm of breast  We did discuss her last mammogram being 2018.  She thinks she had one done more recently than this so we can check with [facility] but otherwise we will get 1 scheduled for her.  - MA-SCREENING MAMMO BILAT W/TOMOSYNTHESIS W/CAD; Future    6. Reactive hypoglycemia  Discussed reactive hypoglycemia.  She is managing this fairly well.  A1c has been good but this may be because of an average of highs and lows.  We did discuss trying to stick more towards fats and proteins versus carb rich foods to keep this stable.    7.  Left knee swelling  I do not have a high suspicion of infection given how well she is doing in general.  This is likely just a reaction to the PRP as well as Visco that she had done a couple months ago.  This should continue to resolve with time as long as she does not continue to get these injections.  She did have some joint fluid sent off for evaluation and she will let us know what the results of this are and of asked her to have the clinic send us the results via fax as well.  We did discuss some rehab exercises per tickly quad sets to get her quad turned back on so that she is more safe plan pickleball and can reduce risk for falls.    Records requested.      Followup: Return in about 6 months (around 4/23/2024) for follow up meds.

## 2023-10-23 NOTE — LETTER
[facility]  Saige Patel M.D.  [id number]  [place] [address]  Fax: [phone]   Authorization for Release/Disclosure of   Protected Health Information   Name: INDY ROYAL : 1940 SSN: [id number]   Address: [address] [place] [address]  [place] [address] Phone:    [phone] (home)    I authorize the entity listed below to release/disclose the PHI below to:   [facility]/Saige Patel M.D. and Saige Patel M.D.   Provider or Entity Name:  [facility]    Address   City, [facility]   Phone:      Fax:[phone]     Reason for request: continuity of care   Information to be released:    [  ] LAST COLONOSCOPY,  including any PATH REPORT and follow-up  [  ] LAST FIT/COLOGUARD RESULT [XX  ] LAST DEXA  [XX  ] LAST MAMMOGRAM  [  ] LAST PAP  [  ] LAST LABS [  ] RETINA EXAM REPORT  [  ] IMMUNIZATION RECORDS  [  ] Release all info      [  ] Check here and initial the line next to each item to release ALL health information INCLUDING  _____ Care and treatment for drug and / or alcohol abuse  _____ HIV testing, infection status, or AIDS  _____ Genetic Testing    DATES OF SERVICE OR TIME PERIOD TO BE DISCLOSED: _____________  I understand and acknowledge that:  * This Authorization may be revoked at any time by you in writing, except if your health information has already been used or disclosed.  * Your health information that will be used or disclosed as a result of you signing this authorization could be re-disclosed by the recipient. If this occurs, your re-disclosed health information may no longer be protected by State or Federal laws.  * You may refuse to sign this Authorization. Your refusal will not affect your ability to obtain treatment.  * This Authorization becomes effective upon signing and will  on (date) __________.      If no date is indicated, this Authorization will  one (1) year from the signature date.    Name: Indy Yancey  Jorge  Signature: Date:   10/23/2023

## 2023-10-24 ENCOUNTER — HOSPITAL ENCOUNTER (OUTPATIENT)
Dept: RADIOLOGY | Facility: MEDICAL CENTER | Age: 83
End: 2023-10-24
Attending: FAMILY MEDICINE
Payer: MEDICARE

## 2023-10-24 DIAGNOSIS — Z12.31 ENCOUNTER FOR SCREENING MAMMOGRAM FOR MALIGNANT NEOPLASM OF BREAST: ICD-10-CM

## 2023-10-24 PROCEDURE — 77063 BREAST TOMOSYNTHESIS BI: CPT

## 2023-11-03 ENCOUNTER — HOSPITAL ENCOUNTER (OUTPATIENT)
Dept: RADIOLOGY | Facility: MEDICAL CENTER | Age: 83
End: 2023-11-03
Payer: MEDICARE

## 2023-11-29 ENCOUNTER — PATIENT MESSAGE (OUTPATIENT)
Dept: HEALTH INFORMATION MANAGEMENT | Facility: OTHER | Age: 83
End: 2023-11-29

## 2024-01-04 ENCOUNTER — OFFICE VISIT (OUTPATIENT)
Dept: MEDICAL GROUP | Facility: LAB | Age: 84
End: 2024-01-04
Payer: MEDICARE

## 2024-01-04 ENCOUNTER — HOSPITAL ENCOUNTER (OUTPATIENT)
Dept: LAB | Facility: MEDICAL CENTER | Age: 84
End: 2024-01-04
Attending: INTERNAL MEDICINE
Payer: MEDICARE

## 2024-01-04 VITALS
SYSTOLIC BLOOD PRESSURE: 118 MMHG | TEMPERATURE: 98.5 F | HEIGHT: 62 IN | OXYGEN SATURATION: 94 % | BODY MASS INDEX: 20.43 KG/M2 | DIASTOLIC BLOOD PRESSURE: 74 MMHG | HEART RATE: 67 BPM | WEIGHT: 111 LBS | RESPIRATION RATE: 16 BRPM

## 2024-01-04 DIAGNOSIS — R42 VERTIGO: ICD-10-CM

## 2024-01-04 DIAGNOSIS — T14.8XXA BRUISING: ICD-10-CM

## 2024-01-04 DIAGNOSIS — I48.0 PAROXYSMAL ATRIAL FIBRILLATION (HCC): ICD-10-CM

## 2024-01-04 DIAGNOSIS — E78.5 DYSLIPIDEMIA: ICD-10-CM

## 2024-01-04 DIAGNOSIS — R19.7 DIARRHEA, UNSPECIFIED TYPE: ICD-10-CM

## 2024-01-04 LAB
BASOPHILS # BLD AUTO: 0.8 % (ref 0–1.8)
BASOPHILS # BLD: 0.04 K/UL (ref 0–0.12)
EOSINOPHIL # BLD AUTO: 0.1 K/UL (ref 0–0.51)
EOSINOPHIL NFR BLD: 2 % (ref 0–6.9)
ERYTHROCYTE [DISTWIDTH] IN BLOOD BY AUTOMATED COUNT: 38.6 FL (ref 35.9–50)
HCT VFR BLD AUTO: 43.9 % (ref 37–47)
HGB BLD-MCNC: 15 G/DL (ref 12–16)
IMM GRANULOCYTES # BLD AUTO: 0.01 K/UL (ref 0–0.11)
IMM GRANULOCYTES NFR BLD AUTO: 0.2 % (ref 0–0.9)
LYMPHOCYTES # BLD AUTO: 1.04 K/UL (ref 1–4.8)
LYMPHOCYTES NFR BLD: 21 % (ref 22–41)
MCH RBC QN AUTO: 31.9 PG (ref 27–33)
MCHC RBC AUTO-ENTMCNC: 34.2 G/DL (ref 32.2–35.5)
MCV RBC AUTO: 93.4 FL (ref 81.4–97.8)
MONOCYTES # BLD AUTO: 0.42 K/UL (ref 0–0.85)
MONOCYTES NFR BLD AUTO: 8.5 % (ref 0–13.4)
NEUTROPHILS # BLD AUTO: 3.35 K/UL (ref 1.82–7.42)
NEUTROPHILS NFR BLD: 67.5 % (ref 44–72)
NRBC # BLD AUTO: 0 K/UL
NRBC BLD-RTO: 0 /100 WBC (ref 0–0.2)
PLATELET # BLD AUTO: 236 K/UL (ref 164–446)
PMV BLD AUTO: 11 FL (ref 9–12.9)
RBC # BLD AUTO: 4.7 M/UL (ref 4.2–5.4)
WBC # BLD AUTO: 5 K/UL (ref 4.8–10.8)

## 2024-01-04 PROCEDURE — 99214 OFFICE O/P EST MOD 30 MIN: CPT | Performed by: INTERNAL MEDICINE

## 2024-01-04 PROCEDURE — 3078F DIAST BP <80 MM HG: CPT | Performed by: INTERNAL MEDICINE

## 2024-01-04 PROCEDURE — 36415 COLL VENOUS BLD VENIPUNCTURE: CPT

## 2024-01-04 PROCEDURE — 3074F SYST BP LT 130 MM HG: CPT | Performed by: INTERNAL MEDICINE

## 2024-01-04 PROCEDURE — 80053 COMPREHEN METABOLIC PANEL: CPT

## 2024-01-04 PROCEDURE — 85025 COMPLETE CBC W/AUTO DIFF WBC: CPT

## 2024-01-04 RX ORDER — CHOLESTYRAMINE 4 G/9G
4 POWDER, FOR SUSPENSION ORAL DAILY
Qty: 348.6 G | Refills: 0 | Status: SHIPPED | OUTPATIENT
Start: 2024-01-04 | End: 2024-04-03

## 2024-01-04 RX ORDER — NITROGLYCERIN 0.4 MG/1
0.4 TABLET SUBLINGUAL PRN
Qty: 25 TABLET | Refills: 0 | Status: SHIPPED | OUTPATIENT
Start: 2024-01-04 | End: 2024-01-09

## 2024-01-04 RX ORDER — MECLIZINE HCL 12.5 MG/1
12.5 TABLET ORAL 3 TIMES DAILY PRN
Qty: 30 TABLET | Refills: 0 | Status: ON HOLD | OUTPATIENT
Start: 2024-01-04 | End: 2024-03-11

## 2024-01-04 ASSESSMENT — FIBROSIS 4 INDEX: FIB4 SCORE: 1.71

## 2024-01-05 LAB
ALBUMIN SERPL BCP-MCNC: 4.3 G/DL (ref 3.2–4.9)
ALBUMIN/GLOB SERPL: 1.8 G/DL
ALP SERPL-CCNC: 59 U/L (ref 30–99)
ALT SERPL-CCNC: 16 U/L (ref 2–50)
ANION GAP SERPL CALC-SCNC: 13 MMOL/L (ref 7–16)
AST SERPL-CCNC: 19 U/L (ref 12–45)
BILIRUB SERPL-MCNC: 0.5 MG/DL (ref 0.1–1.5)
BUN SERPL-MCNC: 18 MG/DL (ref 8–22)
CALCIUM ALBUM COR SERPL-MCNC: 9.6 MG/DL (ref 8.5–10.5)
CALCIUM SERPL-MCNC: 9.8 MG/DL (ref 8.5–10.5)
CHLORIDE SERPL-SCNC: 100 MMOL/L (ref 96–112)
CO2 SERPL-SCNC: 27 MMOL/L (ref 20–33)
CREAT SERPL-MCNC: 0.67 MG/DL (ref 0.5–1.4)
GFR SERPLBLD CREATININE-BSD FMLA CKD-EPI: 86 ML/MIN/1.73 M 2
GLOBULIN SER CALC-MCNC: 2.4 G/DL (ref 1.9–3.5)
GLUCOSE SERPL-MCNC: 78 MG/DL (ref 65–99)
POTASSIUM SERPL-SCNC: 4.3 MMOL/L (ref 3.6–5.5)
PROT SERPL-MCNC: 6.7 G/DL (ref 6–8.2)
SODIUM SERPL-SCNC: 140 MMOL/L (ref 135–145)

## 2024-01-05 NOTE — PROGRESS NOTES
CC: Indy Patel is a 83 y.o. female is suffering from   Chief Complaint   Patient presents with    Bleeding/Bruising     Getting worse         SUBJECTIVE:  1. Paroxysmal atrial fibrillation (HCC)  Indy is here for follow-up, has been followed previously in [facility] now followed by Dr. Nicci Best.  Patient apparently has had problems with bruising recently, had talked to her doctor who is recommended she stop aspirin    2. Dyslipidemia  History of dyslipidemia currently stable    3. Bruising  History of bruising specifically left lower lip also posterior right leg    4. Vertigo  History of vertigo on meclizine periodically    5. Diarrhea, unspecified type  Continue with cholestyramine as previously prescribed        Past social, family, history:   Social History     Tobacco Use    Smoking status: Never    Smokeless tobacco: Never   Substance Use Topics    Alcohol use: No     Comment: once a month    Drug use: No         MEDICATIONS:    Current Outpatient Medications:     nitroglycerin (NITROSTAT) 0.4 MG SL Tab, Place 1 Tablet under the tongue as needed for Chest Pain (q 5 min x 3 then 911) for up to 5 days., Disp: 25 Tablet, Rfl: 0    meclizine (ANTIVERT) 12.5 MG Tab, Take 1 Tablet by mouth 3 times a day as needed for Dizziness., Disp: 30 Tablet, Rfl: 0    cholestyramine (QUESTRAN) 4 GM/DOSE powder, Take 4 g by mouth every day for 90 days., Disp: 348.6 g, Rfl: 0    verapamil SR (CALAN SR) 120 MG CR tablet, Take 120 mg by mouth 2 (two) times a day., Disp: , Rfl:     traZODone (DESYREL) 50 MG Tab, Take 1 Tablet by mouth at bedtime., Disp: , Rfl:     Continuous Blood Gluc Sensor (FREESTYLE AYUSH 2 SENSOR) [facility] [place], USE EVERY 2 WEEKS. USE TO MONITOR GLUCOSE CONTINUOUSLY REPLACE SENSOR EVERY 14 DAYS, Disp: , Rfl:     Continuous Blood Gluc  (FREESTYLE AYUSH 2 READER) Device, USE TO MONITOR GLUCOSE CONTINOUSLY, Disp: , Rfl:     aspirin 81 MG EC tablet, Take 81 mg by mouth every day.,  Disp: , Rfl:     Bimatoprost 0.03 % Solution, APPLY 1 SOLUTION TOPICALLY ONTO EACH EYELID AT BEDTIME, Disp: , Rfl:     Hyoscyamine Sulfate SL 0.125 MG SL Tab, Place 0.125 mg under the tongue., Disp: , Rfl:     LORazepam (ATIVAN) 0.5 MG Tab, Take 1 Tablet by mouth every 6 hours as needed., Disp: , Rfl:     traMADol (ULTRAM) 50 MG Tab, Take 50 mg by mouth every four hours as needed., Disp: , Rfl:     Calcium Carbonate-Vitamin D (CALCIUM-VITAMIN D) 600-125 MG-UNIT Tab, Take 1 Tablet by mouth every day., Disp: , Rfl:     Vitamin E 180 MG Cap, Take 180 mg by mouth every day., Disp: , Rfl:     mesalamine (LIALDA) 1.2 GM Tablet Delayed Response, Take 1.2 g by mouth every morning with breakfast., Disp: , Rfl:     Cyanocobalamin (B-12) 1000 MCG Tab, Take 1,000 mcg by mouth every day., Disp: , Rfl:     Probiotic Product (PROBIOTIC DAILY PO), Take 1 Capsule by mouth every day., Disp: , Rfl:     PROBLEMS:  Patient Active Problem List    Diagnosis Date Noted    Squamous cell carcinoma, face 10/23/2023    History of TIA (transient ischemic attack) and stroke 03/21/2022    Benign paroxysmal positional vertigo of right ear 03/18/2022    Irregular astigmatism of both eyes 09/28/2021    Transient vision disturbance of both eyes 06/03/2021    Pseudophakia of both eyes 06/03/2021    Salivary gland calculus 08/14/2020    Essential hypertension 02/19/2020    Migraine with aura and without status migrainosus, not intractable 09/12/2019    Non-rheumatic mitral regurgitation 08/09/2019    Essential tremor 03/14/2019    Diverticulitis 11/02/2018    Need for vaccination for H flu type B 10/11/2018    Paroxysmal atrial fibrillation (HCC) 10/02/2018    S/P cholecystectomy 02/06/2018    Postprandial hypoglycemia 08/09/2017    Osteopenia of multiple sites 04/13/2017    Right wrist tendonitis 10/10/2016    PSVT (paroxysmal supraventricular tachycardia) 07/06/2016    Encounter for screening mammogram for breast cancer 10/09/2015    Pancreatic cyst  "09/29/2015    Vitamin D insufficiency 09/22/2015    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    Chronic pancreatitis (HCC) 10/16/2014    Osteoporosis 01/23/2014    Insomnia 01/08/2014    Ventricular premature beats 02/15/2013    Arthritis 11/15/2012    GERD (gastroesophageal reflux disease) 10/31/2012    Benign neoplasm of pancreas, except islets of Langerhans 08/18/2012    Disorder of bone and cartilage 08/18/2012    Dizziness 08/18/2012    Hyperglycemia 08/18/2012    Lump or mass in breast 08/18/2012    Myalgia and myositis 08/18/2012    Noninfectious gastroenteritis 08/18/2012    Palpitations 08/18/2012    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  Heart: No chest pain.  Lungs:  No shortness of Breath.  Psychological: Jae unusual Anxiety depression     PHYSICAL EXAM   Constitutional: Alert, cooperative, not in acute distress.  Cardiovascular:  Rate Rhythm is regular without murmurs rubs clicks.     Thorax & Lungs: Clear to auscultation, no wheezing, rhonchi, or rales  HENT: Normocephalic, Atraumatic.  Eyes: PERRLA, EOMI, Conjunctiva normal.   Neck: Trachia is midline no swelling of the thyroid.   Lymphatic: No lymphadenopathy noted.   Neurologic: Alert & oriented x 3, cranial nerves II through XII are intact, Normal motor function, Normal sensory function, No focal deficits noted.   Psychiatric: Affect normal, Judgment normal, Mood normal.     VITAL SIGNS:/74 (BP Location: Left arm, Patient Position: Sitting, BP Cuff Size: Adult)   Pulse 67   Temp 36.9 °C (98.5 °F)   Resp 16   Ht 1.575 m (5' 2\")   Wt 50.3 kg (111 lb)   SpO2 94%   BMI 20.30 kg/m²     Labs: Reviewed    Assessment:                                                     Plan:    1. Paroxysmal atrial " fibrillation (HCC)  History of paroxysmal atrial fibrillation followed by Dr. Nicci Best, previous doctors recommended stopping aspirin because of issues associated #3 below    2. Dyslipidemia  Continue nitroglycerin if needed  - nitroglycerin (NITROSTAT) 0.4 MG SL Tab; Place 1 Tablet under the tongue as needed for Chest Pain (q 5 min x 3 then 911) for up to 5 days.  Dispense: 25 Tablet; Refill: 0    3. Bruising  Check CBC comprehensive metabolic panel stop aspirin  - CBC WITH DIFFERENTIAL; Future  - Comp Metabolic Panel; Future    4. Vertigo  Continue with Antivert if necessary  - meclizine (ANTIVERT) 12.5 MG Tab; Take 1 Tablet by mouth 3 times a day as needed for Dizziness.  Dispense: 30 Tablet; Refill: 0    5. Diarrhea, unspecified type  Prescription refilled for cholestyramine  - cholestyramine (QUESTRAN) 4 GM/DOSE powder; Take 4 g by mouth every day for 90 days.  Dispense: 348.6 g; Refill: 0

## 2024-01-26 ENCOUNTER — OFFICE VISIT (OUTPATIENT)
Dept: MEDICAL GROUP | Facility: LAB | Age: 84
End: 2024-01-26
Payer: MEDICARE

## 2024-01-26 VITALS
BODY MASS INDEX: 19.69 KG/M2 | HEART RATE: 68 BPM | SYSTOLIC BLOOD PRESSURE: 110 MMHG | HEIGHT: 62 IN | TEMPERATURE: 97.3 F | WEIGHT: 107 LBS | DIASTOLIC BLOOD PRESSURE: 70 MMHG | OXYGEN SATURATION: 97 % | RESPIRATION RATE: 12 BRPM

## 2024-01-26 DIAGNOSIS — Z78.9 ASPIRIN INTOLERANCE: ICD-10-CM

## 2024-01-26 DIAGNOSIS — I95.2 HYPOTENSION DUE TO DRUGS: ICD-10-CM

## 2024-01-26 PROCEDURE — 99213 OFFICE O/P EST LOW 20 MIN: CPT | Performed by: FAMILY MEDICINE

## 2024-01-26 PROCEDURE — 3074F SYST BP LT 130 MM HG: CPT | Performed by: FAMILY MEDICINE

## 2024-01-26 PROCEDURE — 3078F DIAST BP <80 MM HG: CPT | Performed by: FAMILY MEDICINE

## 2024-01-26 ASSESSMENT — FIBROSIS 4 INDEX: FIB4 SCORE: 1.69

## 2024-01-26 ASSESSMENT — PATIENT HEALTH QUESTIONNAIRE - PHQ9: CLINICAL INTERPRETATION OF PHQ2 SCORE: 0

## 2024-01-26 NOTE — PROGRESS NOTES
Subjective:     Chief Complaint   Patient presents with    Follow-Up     3 week         HPI:   Indy presents today with 3-week follow-up.  Previously had seen one of my colleagues for some bruising that she was having.  She was advised to stop her aspirin.  She had these at her jaw, right leg. These are better.       Current Outpatient Medications Ordered in Epic   Medication Sig Dispense Refill    meclizine (ANTIVERT) 12.5 MG Tab Take 1 Tablet by mouth 3 times a day as needed for Dizziness. 30 Tablet 0    cholestyramine (QUESTRAN) 4 GM/DOSE powder Take 4 g by mouth every day for 90 days. 348.6 g 0    verapamil SR (CALAN SR) 120 MG CR tablet Take 120 mg by mouth 2 (two) times a day.      Continuous Blood Gluc Sensor (FREESTYLE AYUSH 2 SENSOR) [facility] USE EVERY 2 WEEKS. USE TO MONITOR GLUCOSE CONTINUOUSLY REPLACE SENSOR EVERY 14 DAYS      Continuous Blood Gluc  (FREESTYLE AYUSH 2 READER) Device USE TO MONITOR GLUCOSE CONTINOUSLY      aspirin 81 MG EC tablet Take 81 mg by mouth every day.      Bimatoprost 0.03 % Solution APPLY 1 SOLUTION TOPICALLY ONTO EACH EYELID AT BEDTIME      Hyoscyamine Sulfate SL 0.125 MG SL Tab Place 0.125 mg under the tongue.      LORazepam (ATIVAN) 0.5 MG Tab Take 1 Tablet by mouth every 6 hours as needed.      traZODone (DESYREL) 50 MG Tab Take 1 Tablet by mouth at bedtime.      traMADol (ULTRAM) 50 MG Tab Take 50 mg by mouth every four hours as needed.      Calcium Carbonate-Vitamin D (CALCIUM-VITAMIN D) 600-125 MG-UNIT Tab Take 1 Tablet by mouth every day.      Vitamin E 180 MG Cap Take 180 mg by mouth every day.      mesalamine (LIALDA) 1.2 GM Tablet Delayed Response Take 1.2 g by mouth every morning with breakfast.      Cyanocobalamin (B-12) 1000 MCG Tab Take 1,000 mcg by mouth every day.      Probiotic Product (PROBIOTIC DAILY PO) Take 1 Capsule by mouth every day.       No current [facility]-ordered facility-administered medications on file.         ROS:  Gen: no fevers/chills, no  "changes in weight  Eyes: no changes in vision  ENT: no sore throat, no hearing loss, no bloody nose  Pulm: no sob, no cough  CV: no chest pain, no palpitations  GI: no nausea/vomiting, no diarrhea  : no dysuria  MSk: no myalgias  Skin: no rash  Neuro: no headaches, no numbness/tingling  Heme/Lymph: no easy bruising      Objective:     Exam:  /70 (BP Location: Left arm, Patient Position: Sitting, BP Cuff Size: Adult)   Pulse 68   Temp 36.3 °C (97.3 °F)   Resp 12   Ht 1.575 m (5' 2\")   Wt 48.5 kg (107 lb)   SpO2 97%   BMI 19.57 kg/m²  Body mass index is 19.57 kg/m².    Gen: Alert and oriented, No apparent distress.  Neck: Neck is supple without lymphadenopathy.  Lungs: Normal effort, CTA bilaterally, no wheezes, rhonchi, or rales  CV: Regular rate and rhythm. No murmurs, rubs, or gallops.  Ext: No clubbing, cyanosis, edema.      Assessment & Plan:     83 y.o. female with the following -     1. Aspirin intolerance  We discussed staying off the aspirin.  She seems to have resolved her symptoms at this point.  She does have some complaints of some redness and heat in her feet when she is watching TV at night but when she goes to bed and lays down it seems to resolve.  No itching, no swelling otherwise.  This resolves fairly easily.  Discussed monitoring this over time.    2. Hypotension due to drugs  Discussed some lower blood pressure today she does have some symptoms that she gets a little dizzy when she is gets up from sitting down.  She will go and cut her evening dose of verapamil in half and keep track of her blood pressure over the next week.  She will let me know what her numbers are and then we can decide if we need to adjust further.              No follow-ups on file.    Please note that this dictation was created using voice recognition software. I have made every reasonable attempt to correct obvious errors, but I expect that there are errors of grammar and possibly content that I did not " discover before finalizing the note.

## 2024-03-10 ENCOUNTER — HOSPITAL ENCOUNTER (EMERGENCY)
Facility: MEDICAL CENTER | Age: 84
End: 2024-03-10
Attending: EMERGENCY MEDICINE
Payer: MEDICARE

## 2024-03-10 ENCOUNTER — APPOINTMENT (OUTPATIENT)
Dept: RADIOLOGY | Facility: MEDICAL CENTER | Age: 84
DRG: 085 | End: 2024-03-10
Attending: EMERGENCY MEDICINE
Payer: MEDICARE

## 2024-03-10 ENCOUNTER — HOSPITAL ENCOUNTER (INPATIENT)
Facility: MEDICAL CENTER | Age: 84
LOS: 3 days | DRG: 085 | End: 2024-03-13
Attending: SURGERY | Admitting: SURGERY
Payer: MEDICARE

## 2024-03-10 ENCOUNTER — APPOINTMENT (OUTPATIENT)
Dept: RADIOLOGY | Facility: MEDICAL CENTER | Age: 84
End: 2024-03-10
Attending: EMERGENCY MEDICINE
Payer: MEDICARE

## 2024-03-10 VITALS
HEIGHT: 62 IN | DIASTOLIC BLOOD PRESSURE: 104 MMHG | SYSTOLIC BLOOD PRESSURE: 160 MMHG | HEART RATE: 81 BPM | BODY MASS INDEX: 20.65 KG/M2 | TEMPERATURE: 98 F | RESPIRATION RATE: 20 BRPM | WEIGHT: 112.21 LBS | OXYGEN SATURATION: 96 %

## 2024-03-10 DIAGNOSIS — S01.81XA LACERATION OF FOREHEAD, INITIAL ENCOUNTER: ICD-10-CM
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
## 2024-03-10 DIAGNOSIS — T14.90XA TRAUMA: ICD-10-CM

## 2024-03-10 DIAGNOSIS — S06.5X0A POST-TRAUMATIC SUBDURAL HEMATOMA, WITHOUT LOSS OF CONSCIOUSNESS, INITIAL ENCOUNTER (HCC): ICD-10-CM

## 2024-03-10 DIAGNOSIS — S09.90XA CLOSED HEAD INJURY, INITIAL ENCOUNTER: ICD-10-CM

## 2024-03-10 DIAGNOSIS — S00.531A CONTUSION OF LIP, INITIAL ENCOUNTER: ICD-10-CM

## 2024-03-10 DIAGNOSIS — R11.0 NAUSEA: ICD-10-CM

## 2024-03-10 DIAGNOSIS — S06.5XAA SUBDURAL HEMATOMA (HCC): ICD-10-CM

## 2024-03-10 DIAGNOSIS — S06.5X0A TRAUMATIC SUBDURAL HEMATOMA WITHOUT LOSS OF CONSCIOUSNESS, INITIAL ENCOUNTER (HCC): ICD-10-CM

## 2024-03-10 PROBLEM — Z79.82 ASPIRIN LONG-TERM USE: Status: ACTIVE | Noted: 2024-03-10

## 2024-03-10 PROBLEM — Z53.09 CONTRAINDICATION TO DEEP VEIN THROMBOSIS (DVT) PROPHYLAXIS: Status: ACTIVE | Noted: 2024-03-10

## 2024-03-10 LAB
ABO + RH BLD: NORMAL
ABO GROUP BLD: NORMAL
ABO GROUP BLD: NORMAL
ALBUMIN SERPL BCP-MCNC: 3.8 G/DL (ref 3.2–4.9)
ALBUMIN/GLOB SERPL: 1.4 G/DL
ALP SERPL-CCNC: 69 U/L (ref 30–99)
ALT SERPL-CCNC: 17 U/L (ref 2–50)
ANION GAP SERPL CALC-SCNC: 11 MMOL/L (ref 7–16)
ANION GAP SERPL CALC-SCNC: 13 MMOL/L (ref 7–16)
APTT PPP: 24.5 SEC (ref 24.7–36)
APTT PPP: 26.5 SEC (ref 24.7–36)
AST SERPL-CCNC: 32 U/L (ref 12–45)
BASOPHILS # BLD AUTO: 0.6 % (ref 0–1.8)
BASOPHILS # BLD: 0.05 K/UL (ref 0–0.12)
BILIRUB SERPL-MCNC: 0.3 MG/DL (ref 0.1–1.5)
BLD GP AB SCN SERPL QL: NORMAL
BLD GP AB SCN SERPL QL: NORMAL
BUN SERPL-MCNC: 14 MG/DL (ref 8–22)
BUN SERPL-MCNC: 15 MG/DL (ref 8–22)
CALCIUM ALBUM COR SERPL-MCNC: 9 MG/DL (ref 8.5–10.5)
CALCIUM SERPL-MCNC: 8.8 MG/DL (ref 8.5–10.5)
CALCIUM SERPL-MCNC: 9 MG/DL (ref 8.4–10.2)
CFT BLD TEG: 3.5 MIN (ref 4.6–9.1)
CFT P HPASE BLD TEG: 3.1 MIN (ref 4.3–8.3)
CHLORIDE SERPL-SCNC: 100 MMOL/L (ref 96–112)
CHLORIDE SERPL-SCNC: 100 MMOL/L (ref 96–112)
CLOT ANGLE BLD TEG: 76.9 DEGREES (ref 63–78)
CLOT LYSIS 30M P MA LENFR BLD TEG: 0.3 % (ref 0–2.6)
CO2 SERPL-SCNC: 24 MMOL/L (ref 20–33)
CO2 SERPL-SCNC: 24 MMOL/L (ref 20–33)
CREAT SERPL-MCNC: 0.63 MG/DL (ref 0.5–1.4)
CREAT SERPL-MCNC: 0.69 MG/DL (ref 0.5–1.4)
CT.EXTRINSIC BLD ROTEM: 0.9 MIN (ref 0.8–2.1)
EKG IMPRESSION: NORMAL
EOSINOPHIL # BLD AUTO: 0.96 K/UL (ref 0–0.51)
EOSINOPHIL NFR BLD: 11.2 % (ref 0–6.9)
ERYTHROCYTE [DISTWIDTH] IN BLOOD BY AUTOMATED COUNT: 38.1 FL (ref 35.9–50)
ERYTHROCYTE [DISTWIDTH] IN BLOOD BY AUTOMATED COUNT: 38.4 FL (ref 35.9–50)
ETHANOL BLD-MCNC: <10.1 MG/DL
GFR SERPLBLD CREATININE-BSD FMLA CKD-EPI: 85 ML/MIN/1.73 M 2
GFR SERPLBLD CREATININE-BSD FMLA CKD-EPI: 87 ML/MIN/1.73 M 2
GLOBULIN SER CALC-MCNC: 2.8 G/DL (ref 1.9–3.5)
GLUCOSE SERPL-MCNC: 111 MG/DL (ref 65–99)
GLUCOSE SERPL-MCNC: 113 MG/DL (ref 65–99)
HCG SERPL QL: NEGATIVE
HCT VFR BLD AUTO: 39.9 % (ref 37–47)
HCT VFR BLD AUTO: 40.6 % (ref 37–47)
HGB BLD-MCNC: 14.2 G/DL (ref 12–16)
HGB BLD-MCNC: 14.2 G/DL (ref 12–16)
IMM GRANULOCYTES # BLD AUTO: 0.03 K/UL (ref 0–0.11)
IMM GRANULOCYTES NFR BLD AUTO: 0.4 % (ref 0–0.9)
INR PPP: 0.94 (ref 0.87–1.13)
INR PPP: 1.01 (ref 0.87–1.13)
LYMPHOCYTES # BLD AUTO: 0.78 K/UL (ref 1–4.8)
LYMPHOCYTES NFR BLD: 9.1 % (ref 22–41)
MCF BLD TEG: 64.4 MM (ref 52–69)
MCF.PLATELET INHIB BLD ROTEM: 20.9 MM (ref 15–32)
MCH RBC QN AUTO: 32.5 PG (ref 27–33)
MCH RBC QN AUTO: 32.6 PG (ref 27–33)
MCHC RBC AUTO-ENTMCNC: 35 G/DL (ref 32.2–35.5)
MCHC RBC AUTO-ENTMCNC: 35.6 G/DL (ref 32.2–35.5)
MCV RBC AUTO: 91.3 FL (ref 81.4–97.8)
MCV RBC AUTO: 93.1 FL (ref 81.4–97.8)
MONOCYTES # BLD AUTO: 0.41 K/UL (ref 0–0.85)
MONOCYTES NFR BLD AUTO: 4.8 % (ref 0–13.4)
NEUTROPHILS # BLD AUTO: 6.33 K/UL (ref 1.82–7.42)
NEUTROPHILS NFR BLD: 73.9 % (ref 44–72)
NRBC # BLD AUTO: 0 K/UL
NRBC BLD-RTO: 0 /100 WBC (ref 0–0.2)
PA AA BLD-ACNC: 6.7 % (ref 0–11)
PA ADP BLD-ACNC: 12.1 % (ref 0–17)
PLATELET # BLD AUTO: 207 K/UL (ref 164–446)
PLATELET # BLD AUTO: 241 K/UL (ref 164–446)
PMV BLD AUTO: 10.4 FL (ref 9–12.9)
PMV BLD AUTO: 9.9 FL (ref 9–12.9)
POTASSIUM SERPL-SCNC: 3.6 MMOL/L (ref 3.6–5.5)
POTASSIUM SERPL-SCNC: 4.3 MMOL/L (ref 3.6–5.5)
PROT SERPL-MCNC: 6.6 G/DL (ref 6–8.2)
PROTHROMBIN TIME: 12.7 SEC (ref 12–14.6)
PROTHROMBIN TIME: 13.7 SEC (ref 12–14.6)
RBC # BLD AUTO: 4.36 M/UL (ref 4.2–5.4)
RBC # BLD AUTO: 4.37 M/UL (ref 4.2–5.4)
RH BLD: NORMAL
RH BLD: NORMAL
SODIUM SERPL-SCNC: 135 MMOL/L (ref 135–145)
SODIUM SERPL-SCNC: 137 MMOL/L (ref 135–145)
TEG ALGORITHM TGALG: ABNORMAL
WBC # BLD AUTO: 10.3 K/UL (ref 4.8–10.8)
WBC # BLD AUTO: 8.6 K/UL (ref 4.8–10.8)

## 2024-03-10 PROCEDURE — 93005 ELECTROCARDIOGRAM TRACING: CPT

## 2024-03-10 PROCEDURE — 86901 BLOOD TYPING SEROLOGIC RH(D): CPT | Mod: 91

## 2024-03-10 PROCEDURE — 85025 COMPLETE CBC W/AUTO DIFF WBC: CPT

## 2024-03-10 PROCEDURE — 86900 BLOOD TYPING SEROLOGIC ABO: CPT

## 2024-03-10 PROCEDURE — 86850 RBC ANTIBODY SCREEN: CPT

## 2024-03-10 PROCEDURE — 85347 COAGULATION TIME ACTIVATED: CPT

## 2024-03-10 PROCEDURE — 85027 COMPLETE CBC AUTOMATED: CPT

## 2024-03-10 PROCEDURE — 700105 HCHG RX REV CODE 258: Performed by: NURSE PRACTITIONER

## 2024-03-10 PROCEDURE — 305950 HCHG YELLOW TRAUMA ACT PRE-NOTIFY NO CC

## 2024-03-10 PROCEDURE — 90471 IMMUNIZATION ADMIN: CPT

## 2024-03-10 PROCEDURE — 80053 COMPREHEN METABOLIC PANEL: CPT

## 2024-03-10 PROCEDURE — 0HQ1XZZ REPAIR FACE SKIN, EXTERNAL APPROACH: ICD-10-PCS | Performed by: SURGERY

## 2024-03-10 PROCEDURE — 84703 CHORIONIC GONADOTROPIN ASSAY: CPT

## 2024-03-10 PROCEDURE — 93005 ELECTROCARDIOGRAM TRACING: CPT | Performed by: EMERGENCY MEDICINE

## 2024-03-10 PROCEDURE — 770022 HCHG ROOM/CARE - ICU (200)

## 2024-03-10 PROCEDURE — 85610 PROTHROMBIN TIME: CPT | Mod: 91

## 2024-03-10 PROCEDURE — 36415 COLL VENOUS BLD VENIPUNCTURE: CPT

## 2024-03-10 PROCEDURE — 80048 BASIC METABOLIC PNL TOTAL CA: CPT

## 2024-03-10 PROCEDURE — 700111 HCHG RX REV CODE 636 W/ 250 OVERRIDE (IP): Performed by: EMERGENCY MEDICINE

## 2024-03-10 PROCEDURE — 85730 THROMBOPLASTIN TIME PARTIAL: CPT

## 2024-03-10 PROCEDURE — 82077 ASSAY SPEC XCP UR&BREATH IA: CPT

## 2024-03-10 PROCEDURE — 85576 BLOOD PLATELET AGGREGATION: CPT

## 2024-03-10 PROCEDURE — 86901 BLOOD TYPING SEROLOGIC RH(D): CPT

## 2024-03-10 PROCEDURE — 99285 EMERGENCY DEPT VISIT HI MDM: CPT

## 2024-03-10 PROCEDURE — 85730 THROMBOPLASTIN TIME PARTIAL: CPT | Mod: 91

## 2024-03-10 PROCEDURE — 85384 FIBRINOGEN ACTIVITY: CPT

## 2024-03-10 PROCEDURE — 90715 TDAP VACCINE 7 YRS/> IM: CPT | Performed by: EMERGENCY MEDICINE

## 2024-03-10 PROCEDURE — 85610 PROTHROMBIN TIME: CPT

## 2024-03-10 PROCEDURE — 86900 BLOOD TYPING SEROLOGIC ABO: CPT | Mod: 91

## 2024-03-10 PROCEDURE — 70450 CT HEAD/BRAIN W/O DYE: CPT

## 2024-03-10 PROCEDURE — 99284 EMERGENCY DEPT VISIT MOD MDM: CPT

## 2024-03-10 PROCEDURE — 86850 RBC ANTIBODY SCREEN: CPT | Mod: 91

## 2024-03-10 PROCEDURE — 700111 HCHG RX REV CODE 636 W/ 250 OVERRIDE (IP): Mod: JZ | Performed by: NURSE PRACTITIONER

## 2024-03-10 RX ORDER — AMOXICILLIN 250 MG
1 CAPSULE ORAL NIGHTLY
Status: DISCONTINUED | OUTPATIENT
Start: 2024-03-10 | End: 2024-03-13 | Stop reason: HOSPADM

## 2024-03-10 RX ORDER — ONDANSETRON 4 MG/1
4 TABLET, ORALLY DISINTEGRATING ORAL EVERY 4 HOURS PRN
Status: DISCONTINUED | OUTPATIENT
Start: 2024-03-10 | End: 2024-03-10

## 2024-03-10 RX ORDER — LEVETIRACETAM 500 MG/5ML
500 INJECTION, SOLUTION, CONCENTRATE INTRAVENOUS EVERY 12 HOURS
Status: DISCONTINUED | OUTPATIENT
Start: 2024-03-10 | End: 2024-03-13 | Stop reason: HOSPADM

## 2024-03-10 RX ORDER — LEVETIRACETAM 500 MG/1
500 TABLET ORAL EVERY 12 HOURS
Status: DISCONTINUED | OUTPATIENT
Start: 2024-03-10 | End: 2024-03-13 | Stop reason: HOSPADM

## 2024-03-10 RX ORDER — BISACODYL 10 MG
10 SUPPOSITORY, RECTAL RECTAL
Status: DISCONTINUED | OUTPATIENT
Start: 2024-03-10 | End: 2024-03-13 | Stop reason: HOSPADM

## 2024-03-10 RX ORDER — ONDANSETRON 2 MG/ML
4 INJECTION INTRAMUSCULAR; INTRAVENOUS EVERY 4 HOURS PRN
Status: DISCONTINUED | OUTPATIENT
Start: 2024-03-10 | End: 2024-03-10

## 2024-03-10 RX ORDER — ENEMA 19; 7 G/133ML; G/133ML
1 ENEMA RECTAL
Status: DISCONTINUED | OUTPATIENT
Start: 2024-03-10 | End: 2024-03-13 | Stop reason: HOSPADM

## 2024-03-10 RX ORDER — DOCUSATE SODIUM 100 MG/1
100 CAPSULE, LIQUID FILLED ORAL 2 TIMES DAILY
Status: DISCONTINUED | OUTPATIENT
Start: 2024-03-10 | End: 2024-03-13 | Stop reason: HOSPADM

## 2024-03-10 RX ORDER — AMOXICILLIN 250 MG
1 CAPSULE ORAL
Status: DISCONTINUED | OUTPATIENT
Start: 2024-03-10 | End: 2024-03-13 | Stop reason: HOSPADM

## 2024-03-10 RX ORDER — POLYETHYLENE GLYCOL 3350 17 G/17G
1 POWDER, FOR SOLUTION ORAL 2 TIMES DAILY
Status: DISCONTINUED | OUTPATIENT
Start: 2024-03-10 | End: 2024-03-13 | Stop reason: HOSPADM

## 2024-03-10 RX ORDER — SODIUM CHLORIDE 9 MG/ML
INJECTION, SOLUTION INTRAVENOUS CONTINUOUS
Status: DISCONTINUED | OUTPATIENT
Start: 2024-03-10 | End: 2024-03-13 | Stop reason: HOSPADM

## 2024-03-10 RX ORDER — HYDRALAZINE HYDROCHLORIDE 20 MG/ML
20 INJECTION INTRAMUSCULAR; INTRAVENOUS EVERY 6 HOURS PRN
Status: DISCONTINUED | OUTPATIENT
Start: 2024-03-10 | End: 2024-03-11

## 2024-03-10 RX ORDER — ACETAMINOPHEN 325 MG/1
650 TABLET ORAL EVERY 4 HOURS PRN
Status: DISCONTINUED | OUTPATIENT
Start: 2024-03-10 | End: 2024-03-13 | Stop reason: HOSPADM

## 2024-03-10 RX ADMIN — CLOSTRIDIUM TETANI TOXOID ANTIGEN (FORMALDEHYDE INACTIVATED), CORYNEBACTERIUM DIPHTHERIAE TOXOID ANTIGEN (FORMALDEHYDE INACTIVATED), BORDETELLA PERTUSSIS TOXOID ANTIGEN (GLUTARALDEHYDE INACTIVATED), BORDETELLA PERTUSSIS FILAMENTOUS HEMAGGLUTININ ANTIGEN (FORMALDEHYDE INACTIVATED), BORDETELLA PERTUSSIS PERTACTIN ANTIGEN, AND BORDETELLA PERTUSSIS FIMBRIAE 2/3 ANTIGEN 0.5 ML: 5; 2; 2.5; 5; 3; 5 INJECTION, SUSPENSION INTRAMUSCULAR at 19:41

## 2024-03-10 RX ADMIN — HYDRALAZINE HYDROCHLORIDE 20 MG: 20 INJECTION, SOLUTION INTRAMUSCULAR; INTRAVENOUS at 23:56

## 2024-03-10 RX ADMIN — LEVETIRACETAM 500 MG: 100 INJECTION, SOLUTION, CONCENTRATE INTRAVENOUS at 21:08

## 2024-03-10 RX ADMIN — SODIUM CHLORIDE: 9 INJECTION, SOLUTION INTRAVENOUS at 21:13

## 2024-03-10 ASSESSMENT — COPD QUESTIONNAIRES
DURING THE PAST 4 WEEKS HOW MUCH DID YOU FEEL SHORT OF BREATH: SOME OF THE TIME
COPD SCREENING SCORE: 3
DO YOU EVER COUGH UP ANY MUCUS OR PHLEGM?: NO/ONLY WITH OCCASIONAL COLDS OR INFECTIONS
HAVE YOU SMOKED AT LEAST 100 CIGARETTES IN YOUR ENTIRE LIFE: NO/DON'T KNOW

## 2024-03-10 ASSESSMENT — FIBROSIS 4 INDEX
FIB4 SCORE: 3.15
FIB4 SCORE: 1.66
FIB4 SCORE: 1.69

## 2024-03-11 ENCOUNTER — APPOINTMENT (OUTPATIENT)
Dept: RADIOLOGY | Facility: MEDICAL CENTER | Age: 84
DRG: 085 | End: 2024-03-11
Attending: NURSE PRACTITIONER
Payer: MEDICARE

## 2024-03-11 ENCOUNTER — HOSPITAL ENCOUNTER (OUTPATIENT)
Dept: RADIOLOGY | Facility: MEDICAL CENTER | Age: 84
End: 2024-03-11
Attending: EMERGENCY MEDICINE

## 2024-03-11 ENCOUNTER — HOSPITAL ENCOUNTER (OUTPATIENT)
Dept: RADIOLOGY | Facility: MEDICAL CENTER | Age: 84
End: 2024-03-11
Attending: NURSE PRACTITIONER
Payer: MEDICARE

## 2024-03-11 PROBLEM — I48.91 ATRIAL FIBRILLATION WITH RVR (HCC): Status: ACTIVE | Noted: 2024-03-11

## 2024-03-11 PROBLEM — S01.81XA FOREHEAD LACERATION: Status: ACTIVE | Noted: 2024-03-11

## 2024-03-11 LAB
ALBUMIN SERPL BCP-MCNC: 3.2 G/DL (ref 3.2–4.9)
ALBUMIN/GLOB SERPL: 1.5 G/DL
ALP SERPL-CCNC: 58 U/L (ref 30–99)
ALT SERPL-CCNC: 14 U/L (ref 2–50)
ANION GAP SERPL CALC-SCNC: 13 MMOL/L (ref 7–16)
AST SERPL-CCNC: 25 U/L (ref 12–45)
BASOPHILS # BLD AUTO: 0.3 % (ref 0–1.8)
BASOPHILS # BLD: 0.04 K/UL (ref 0–0.12)
BILIRUB SERPL-MCNC: 0.3 MG/DL (ref 0.1–1.5)
BUN SERPL-MCNC: 11 MG/DL (ref 8–22)
CALCIUM ALBUM COR SERPL-MCNC: 8 MG/DL (ref 8.5–10.5)
CALCIUM SERPL-MCNC: 7.4 MG/DL (ref 8.5–10.5)
CHLORIDE SERPL-SCNC: 107 MMOL/L (ref 96–112)
CO2 SERPL-SCNC: 19 MMOL/L (ref 20–33)
CREAT SERPL-MCNC: 0.59 MG/DL (ref 0.5–1.4)
EKG IMPRESSION: NORMAL
EOSINOPHIL # BLD AUTO: 0.05 K/UL (ref 0–0.51)
EOSINOPHIL NFR BLD: 0.4 % (ref 0–6.9)
ERYTHROCYTE [DISTWIDTH] IN BLOOD BY AUTOMATED COUNT: 38.7 FL (ref 35.9–50)
GFR SERPLBLD CREATININE-BSD FMLA CKD-EPI: 89 ML/MIN/1.73 M 2
GLOBULIN SER CALC-MCNC: 2.1 G/DL (ref 1.9–3.5)
GLUCOSE BLD STRIP.AUTO-MCNC: 140 MG/DL (ref 65–99)
GLUCOSE SERPL-MCNC: 159 MG/DL (ref 65–99)
HCT VFR BLD AUTO: 36.7 % (ref 37–47)
HGB BLD-MCNC: 12.5 G/DL (ref 12–16)
IMM GRANULOCYTES # BLD AUTO: 0.05 K/UL (ref 0–0.11)
IMM GRANULOCYTES NFR BLD AUTO: 0.4 % (ref 0–0.9)
LYMPHOCYTES # BLD AUTO: 0.79 K/UL (ref 1–4.8)
LYMPHOCYTES NFR BLD: 6.9 % (ref 22–41)
MAGNESIUM SERPL-MCNC: 1.4 MG/DL (ref 1.5–2.5)
MCH RBC QN AUTO: 31.6 PG (ref 27–33)
MCHC RBC AUTO-ENTMCNC: 34.1 G/DL (ref 32.2–35.5)
MCV RBC AUTO: 92.9 FL (ref 81.4–97.8)
MONOCYTES # BLD AUTO: 0.64 K/UL (ref 0–0.85)
MONOCYTES NFR BLD AUTO: 5.6 % (ref 0–13.4)
NEUTROPHILS # BLD AUTO: 9.96 K/UL (ref 1.82–7.42)
NEUTROPHILS NFR BLD: 86.4 % (ref 44–72)
NRBC # BLD AUTO: 0 K/UL
NRBC BLD-RTO: 0 /100 WBC (ref 0–0.2)
PHOSPHATE SERPL-MCNC: 2.4 MG/DL (ref 2.5–4.5)
PLATELET # BLD AUTO: 255 K/UL (ref 164–446)
PMV BLD AUTO: 10.2 FL (ref 9–12.9)
POTASSIUM SERPL-SCNC: 3.3 MMOL/L (ref 3.6–5.5)
PROT SERPL-MCNC: 5.3 G/DL (ref 6–8.2)
RBC # BLD AUTO: 3.95 M/UL (ref 4.2–5.4)
SODIUM SERPL-SCNC: 139 MMOL/L (ref 135–145)
WBC # BLD AUTO: 11.5 K/UL (ref 4.8–10.8)

## 2024-03-11 PROCEDURE — 700105 HCHG RX REV CODE 258: Performed by: SURGERY

## 2024-03-11 PROCEDURE — 83735 ASSAY OF MAGNESIUM: CPT

## 2024-03-11 PROCEDURE — 700105 HCHG RX REV CODE 258: Performed by: NURSE PRACTITIONER

## 2024-03-11 PROCEDURE — 85025 COMPLETE CBC W/AUTO DIFF WBC: CPT

## 2024-03-11 PROCEDURE — A9270 NON-COVERED ITEM OR SERVICE: HCPCS | Performed by: PHYSICIAN ASSISTANT

## 2024-03-11 PROCEDURE — 93970 EXTREMITY STUDY: CPT

## 2024-03-11 PROCEDURE — 80053 COMPREHEN METABOLIC PANEL: CPT

## 2024-03-11 PROCEDURE — 84100 ASSAY OF PHOSPHORUS: CPT

## 2024-03-11 PROCEDURE — 700101 HCHG RX REV CODE 250: Performed by: SURGERY

## 2024-03-11 PROCEDURE — 770022 HCHG ROOM/CARE - ICU (200)

## 2024-03-11 PROCEDURE — 700102 HCHG RX REV CODE 250 W/ 637 OVERRIDE(OP): Performed by: PHYSICIAN ASSISTANT

## 2024-03-11 PROCEDURE — 72125 CT NECK SPINE W/O DYE: CPT

## 2024-03-11 PROCEDURE — 700111 HCHG RX REV CODE 636 W/ 250 OVERRIDE (IP): Mod: JZ | Performed by: NURSE PRACTITIONER

## 2024-03-11 PROCEDURE — 70450 CT HEAD/BRAIN W/O DYE: CPT

## 2024-03-11 PROCEDURE — 93010 ELECTROCARDIOGRAM REPORT: CPT | Performed by: INTERNAL MEDICINE

## 2024-03-11 PROCEDURE — 99233 SBSQ HOSP IP/OBS HIGH 50: CPT | Performed by: PHYSICIAN ASSISTANT

## 2024-03-11 PROCEDURE — 82962 GLUCOSE BLOOD TEST: CPT

## 2024-03-11 PROCEDURE — 700111 HCHG RX REV CODE 636 W/ 250 OVERRIDE (IP): Performed by: SURGERY

## 2024-03-11 PROCEDURE — 700111 HCHG RX REV CODE 636 W/ 250 OVERRIDE (IP): Performed by: PHYSICIAN ASSISTANT

## 2024-03-11 PROCEDURE — 93005 ELECTROCARDIOGRAM TRACING: CPT | Performed by: SURGERY

## 2024-03-11 PROCEDURE — 700111 HCHG RX REV CODE 636 W/ 250 OVERRIDE (IP): Mod: JZ | Performed by: SURGERY

## 2024-03-11 RX ORDER — DEXTROSE MONOHYDRATE 50 MG/ML
INJECTION, SOLUTION INTRAVENOUS CONTINUOUS
Status: DISCONTINUED | OUTPATIENT
Start: 2024-03-11 | End: 2024-03-13 | Stop reason: HOSPADM

## 2024-03-11 RX ORDER — MAGNESIUM SULFATE HEPTAHYDRATE 40 MG/ML
2 INJECTION, SOLUTION INTRAVENOUS EVERY 6 HOURS
Status: COMPLETED | OUTPATIENT
Start: 2024-03-11 | End: 2024-03-11

## 2024-03-11 RX ORDER — MIDAZOLAM HYDROCHLORIDE 1 MG/ML
2 INJECTION INTRAMUSCULAR; INTRAVENOUS ONCE
Status: COMPLETED | OUTPATIENT
Start: 2024-03-11 | End: 2024-03-11

## 2024-03-11 RX ORDER — PROCHLORPERAZINE EDISYLATE 5 MG/ML
10 INJECTION INTRAMUSCULAR; INTRAVENOUS EVERY 6 HOURS PRN
Status: DISCONTINUED | OUTPATIENT
Start: 2024-03-11 | End: 2024-03-13 | Stop reason: HOSPADM

## 2024-03-11 RX ORDER — VERAPAMIL HYDROCHLORIDE 240 MG/1
120 TABLET, FILM COATED, EXTENDED RELEASE ORAL EVERY MORNING
Status: DISCONTINUED | OUTPATIENT
Start: 2024-03-11 | End: 2024-03-13

## 2024-03-11 RX ORDER — CYANOCOBALAMIN (VITAMIN B-12) 1000 MCG
1000 TABLET ORAL DAILY
COMMUNITY

## 2024-03-11 RX ORDER — TRAZODONE HYDROCHLORIDE 100 MG/1
50 TABLET ORAL
Status: DISCONTINUED | OUTPATIENT
Start: 2024-03-11 | End: 2024-03-13 | Stop reason: HOSPADM

## 2024-03-11 RX ORDER — BIMATOPROST 0.3 MG/ML
1 SOLUTION/ DROPS OPHTHALMIC EVERY EVENING
COMMUNITY

## 2024-03-11 RX ORDER — VERAPAMIL HYDROCHLORIDE 120 MG/1
120 CAPSULE, EXTENDED RELEASE ORAL 2 TIMES DAILY
COMMUNITY

## 2024-03-11 RX ORDER — NOREPINEPHRINE BITARTRATE 0.03 MG/ML
0-1 INJECTION, SOLUTION INTRAVENOUS CONTINUOUS
Status: DISCONTINUED | OUTPATIENT
Start: 2024-03-11 | End: 2024-03-11

## 2024-03-11 RX ORDER — LABETALOL HYDROCHLORIDE 5 MG/ML
10 INJECTION, SOLUTION INTRAVENOUS EVERY 6 HOURS PRN
Status: DISCONTINUED | OUTPATIENT
Start: 2024-03-11 | End: 2024-03-13 | Stop reason: HOSPADM

## 2024-03-11 RX ORDER — NITROGLYCERIN 0.4 MG/1
0.4 TABLET SUBLINGUAL PRN
COMMUNITY

## 2024-03-11 RX ADMIN — POTASSIUM PHOSPHATE, MONOBASIC AND POTASSIUM PHOSPHATE, DIBASIC 30 MMOL: 224; 236 INJECTION, SOLUTION, CONCENTRATE INTRAVENOUS at 09:36

## 2024-03-11 RX ADMIN — MIDAZOLAM HYDROCHLORIDE 2 MG: 1 INJECTION, SOLUTION INTRAMUSCULAR; INTRAVENOUS at 00:48

## 2024-03-11 RX ADMIN — NOREPINEPHRINE BITARTRATE 0.1 MCG/KG/MIN: 1 INJECTION INTRAVENOUS at 00:57

## 2024-03-11 RX ADMIN — SODIUM CHLORIDE: 9 INJECTION, SOLUTION INTRAVENOUS at 01:29

## 2024-03-11 RX ADMIN — LEVETIRACETAM 500 MG: 100 INJECTION, SOLUTION, CONCENTRATE INTRAVENOUS at 18:08

## 2024-03-11 RX ADMIN — FENTANYL CITRATE 25 MCG: 50 INJECTION, SOLUTION INTRAMUSCULAR; INTRAVENOUS at 16:59

## 2024-03-11 RX ADMIN — DEXTROSE MONOHYDRATE: 50 INJECTION, SOLUTION INTRAVENOUS at 17:09

## 2024-03-11 RX ADMIN — AMIODARONE HYDROCHLORIDE 1 MG/MIN: 1.8 INJECTION, SOLUTION INTRAVENOUS at 00:21

## 2024-03-11 RX ADMIN — PROCHLORPERAZINE EDISYLATE 10 MG: 5 INJECTION INTRAMUSCULAR; INTRAVENOUS at 21:58

## 2024-03-11 RX ADMIN — PROCHLORPERAZINE EDISYLATE 10 MG: 5 INJECTION INTRAMUSCULAR; INTRAVENOUS at 15:05

## 2024-03-11 RX ADMIN — FENTANYL CITRATE 25 MCG: 50 INJECTION, SOLUTION INTRAMUSCULAR; INTRAVENOUS at 09:33

## 2024-03-11 RX ADMIN — LEVETIRACETAM 500 MG: 100 INJECTION, SOLUTION, CONCENTRATE INTRAVENOUS at 05:36

## 2024-03-11 RX ADMIN — MAGNESIUM SULFATE HEPTAHYDRATE 2 G: 2 INJECTION, SOLUTION INTRAVENOUS at 18:10

## 2024-03-11 RX ADMIN — MAGNESIUM SULFATE HEPTAHYDRATE 2 G: 2 INJECTION, SOLUTION INTRAVENOUS at 13:24

## 2024-03-11 RX ADMIN — DEXTROSE MONOHYDRATE: 50 INJECTION, SOLUTION INTRAVENOUS at 00:22

## 2024-03-11 RX ADMIN — NOREPINEPHRINE BITARTRATE 0.1 MCG/KG/MIN: 1 INJECTION INTRAVENOUS at 01:00

## 2024-03-11 RX ADMIN — AMIODARONE HYDROCHLORIDE 0.5 MG/MIN: 1.8 INJECTION, SOLUTION INTRAVENOUS at 16:58

## 2024-03-11 RX ADMIN — FENTANYL CITRATE 25 MCG: 50 INJECTION, SOLUTION INTRAMUSCULAR; INTRAVENOUS at 12:49

## 2024-03-11 RX ADMIN — AMIODARONE HYDROCHLORIDE 0.5 MG/MIN: 1.8 INJECTION, SOLUTION INTRAVENOUS at 05:37

## 2024-03-11 RX ADMIN — TRAZODONE HYDROCHLORIDE 50 MG: 100 TABLET ORAL at 20:58

## 2024-03-11 ASSESSMENT — ENCOUNTER SYMPTOMS
ROS GI COMMENTS: BM 3/11
TINGLING: 0
FOCAL WEAKNESS: 0
CHILLS: 0
BACK PAIN: 0
ABDOMINAL PAIN: 0
SHORTNESS OF BREATH: 0
SENSORY CHANGE: 0
NECK PAIN: 0
FEVER: 0
VOMITING: 1
HEADACHES: 1
SPEECH CHANGE: 0
DIZZINESS: 0
MYALGIAS: 1
NAUSEA: 1

## 2024-03-11 ASSESSMENT — FIBROSIS 4 INDEX: FIB4 SCORE: 2.2

## 2024-03-11 NOTE — PROGRESS NOTES
2356: Patient suddenly into afib RVR after given hydralazine, Dr. Vance notified. Amiodarone protocol initiated.    0033: Became hypotensive, 60s/40s with lowest MAP of 40. Dr. Vance at bedside. 1L NS bolus ordered. HR maintaining in 140-180s with continued hypotension.     0048: 2 mg midazolam given for synchronized cardioversion.    0049: shock delivered at 100J, rates 120-160    0052: shock delivered at 120J, rates 100-110 sinus    0100: norepinephrine started at 0.1 mcg/kg/min per Dr. Vance. Patient transported to CT with x2 TICU RN

## 2024-03-11 NOTE — ASSESSMENT & PLAN NOTE
13 mm acute right cerebral hemispheric subdural hematoma with mass effect and 3 mm leftward midline shift.  Interval head CT stable.   No emergent surgical intervention warranted.   Post traumatic pharmacologic seizure prophylaxis for 1 week.  Speech Language Pathology cognitive evaluation.  Follow-up in 1 month for evaluation of possible subdural evacuation through joni holes once blood liquefies.   Solomon Mcguire MD. Neurosurgeon. [facility].

## 2024-03-11 NOTE — ASSESSMENT & PLAN NOTE
3/11 Patient converted to a fib with RVR after being given Zofran and Hydralazine.   - Performed synchronized cardioversion with 2 shocks delivered.    - Amiodarone drip initiated.   3/12 Amiodarone drip discontinued. Home Verapamil restarted 3/11.   - Remote tele monitoring on the talley.

## 2024-03-11 NOTE — PROGRESS NOTES
Neurosurgery Progress Note    Subjective:   Headache controlled on meds, persistent nausea & dry heaving    Exam:  A&O  EOM intact, PERRL, facial symmetry  ALONSO with FS  No drift    BP  Min: 61/43  Max: 191/134  Pulse  Av.6  Min: 65  Max: 141  Resp  Av.2  Min: 16  Max: 64  Temp  Av.9 °C (98.5 °F)  Min: 36.6 °C (97.9 °F)  Max: 37.3 °C (99.1 °F)  SpO2  Av.2 %  Min: 91 %  Max: 96 %    No data recorded    Recent Labs     03/10/24  1934 03/10/24  2014 03/11/24  0422   WBC 8.6 10.3 11.5*   RBC 4.36 4.37 3.95*   HEMOGLOBIN 14.2 14.2 12.5   HEMATOCRIT 40.6 39.9 36.7*   MCV 93.1 91.3 92.9   MCH 32.6 32.5 31.6   MCHC 35.0 35.6* 34.1   RDW 38.1 38.4 38.7   PLATELETCT 241 207 255   MPV 9.9 10.4 10.2     Recent Labs     03/10/24  1934 03/10/24  2014 03/11/24  0422   SODIUM 135 137 139   POTASSIUM 3.6 4.3 3.3*   CHLORIDE 100 100 107   CO2  19*   GLUCOSE 113* 111* 159*   BUN 15 14 11   CREATININE 0.69 0.63 0.59   CALCIUM 9.0 8.8 7.4*     Recent Labs     03/10/24  1934 03/10/24  2014   APTT 26.5 24.5*   INR 1.01 0.94     Recent Labs     03/10/24  2014   REACTMIN 3.5*   CLOTKINET 0.9   CLOTANGL 76.9   MAXCLOTS 64.4   [id number] 0.3   PRCINADP 12.1   PRCINAA 6.7       Intake/Output                         03/10/24 0700 - 24 - 24 Total [id number] Total                 Intake    I.V.  --  1723.7 1723.7  --  -- --    Pre-Hospital Volume -- 0 0 -- -- --    Trauma Resuscitation Volume -- 0 0 -- -- --    Amiodarone Volume -- 184.5 184.5 -- -- --    Norepinephrine Volume -- 33.1 33.1 -- -- --    Volume (mL) (NS infusion) -- 1338.1 1338.1 -- -- --    Volume (mL) (dextrose 5% infusion) -- 168 168 -- -- --    Blood  --  0 0  --  -- --    PRBC Total Volume (Non-Barcoded) -- 0 0 -- -- --    FFP Total Volume (Non-Barcoded) -- 0 0 -- -- --    Platelets Total Volume (Non-Barcoded) -- 0 0 -- -- --    Cryoprecipitate (Pooled) Total Volume (Non-Barcoded) -- 0 0  -- -- --    Total Intake -- 1723.7 1723.7 -- -- --       Output    Urine  --  -- --  --  -- --    Number of Times Voided -- 1 x 1 x -- -- --    Other  --  0 0  --  -- --    Pre-Hospital Output -- 0 0 -- -- --    Trauma Resuscitation Output -- 0 0 -- -- --    Blood  --  0 0  --  -- --    Est. Blood Loss -- 0 0 -- -- --    Total Output -- 0 0 -- -- --       Net I/O     -- 1723.7 1723.7 -- -- --              Intake/Output Summary (Last 24 hours) at 3/11/2024 0907  Last data filed at 3/11/2024 0600  Gross per 24 hour   Intake 1723.66 ml   Output 0 ml   Net 1723.66 ml             dextrose 5%   Continuous    amiodarone infusion  0.5 mg/min Continuous    labetalol  10 mg Q6HRS PRN    NORepinephrine  0-1 mcg/kg/min Continuous    potassium phosphate 30 mmol in  mL ivpb  30 mmol Once    magnesium sulfate  2 g Q6HRS    Respiratory Therapy Consult   Continuous RT    Pharmacy Consult Request  1 Each PHARMACY TO DOSE    docusate sodium  100 mg BID    senna-docusate  1 Tablet Nightly    senna-docusate  1 Tablet Q24HRS PRN    polyethylene glycol/lytes  1 Packet BID    magnesium hydroxide  30 mL DAILY    bisacodyl  10 mg Q24HRS PRN    sodium phosphate  1 Each Once PRN    NS   Continuous    acetaminophen  650 mg Q4HRS PRN    fentaNYL  25 mcg Q2HRS PRN    levETIRAcetam  500 mg Q12HRS    Or    levETIRAcetam (Keppra) IV  500 mg Q12HRS       Assessment and Plan:  Hospital day #1 SDH  POD #na  Prophylactic anticoagulation: no         Start date/time: tbd please mobilize     Brain Compression: Yes Traumatic    Neuro exam stable  Continue pain control, antiemetics  Repeat head CT demonstrates stability of large SDH  Continue close monitoring in ICU, may need joni hole craniotomy if any neuro decline  Keppra x7d  No nsaids, asa, anticoags

## 2024-03-11 NOTE — CARE PLAN
The patient is Watcher - Medium risk of patient condition declining or worsening    Shift Goals  Clinical Goals: cristina neuro exams  Patient Goals: rest  Family Goals: rest      Problem: Knowledge Deficit - Standard  Goal: Patient and family/care givers will demonstrate understanding of plan of care, disease process/condition, diagnostic tests and medications  Outcome: Progressing     Problem: Fall Risk  Goal: Patient will remain free from falls  Outcome: Progressing     Problem: Pain - Standard  Goal: Alleviation of pain or a reduction in pain to the patient’s comfort goal  Outcome: Progressing

## 2024-03-11 NOTE — PROGRESS NOTES
Patient arrived to T907   @2020    Temp: 97.8  HR: 93  BP:160/91  O2:95 RA  Wgt: 47.7 kg    Skin:  BACK - redness slow to alejandra from bra strap  Lac to L side above eyebrow.       Belongings:  Bra  Jacket x2   Long sleeve shirt  Tank top  Shoes    Pt has a watch kept on  X2 bracelet kept on  X2 necklace  kept on    Pt states neighbor has phone wallet etc.

## 2024-03-11 NOTE — ED NOTES
Patient BIB [facility] ground unit #[id number]  as a trauma GREEN transfer from [facility]. 83 y.o. female transferred for definitive neurosurgical evaluation following a GLF today. + head strike (L head), - LOC. Takes ASA every other day (81mg). Pt reported to [facility] where CT-head showed SDH. 2 inch laceration appreciated by EMS to L head.     VSS en route, 12-lead EKG neg. No recent ETOH use per EMS.     Patient arrives w/ *no spinal immobilizations* in place.   Chief complaint of L-sided headache.   Medications administered en route: None.     Unknown medical history, home Rx, allergies at this time.

## 2024-03-11 NOTE — H&P
CHIEF COMPLAINT: Headache.     HISTORY OF PRESENT ILLNESS: The patient is an 83 year-old White elderly woman who was injured in a fall. The patient suffered a mechanical ground-level fall. She had no loss of consciousness. The patient is amnestic to the event. The patient takes acetylsalicylic acid (Aspirin) antiplatelet therapy. Her last dose of medication was taken yesterday.   She complains of pain in her head.    TRIAGE CATEGORY: The patient was triaged as a Trauma Yellow Transfer Activation. The patient was initially evaluated at [facility] in [place], [place] where CT imaging demonstrated a subdural hematoma. She was transported to [facility] in [place], [place] for a Neurosurgical trauma evaluation. An expeditious primary and secondary survey with required adjuncts was conducted. See Trauma Narrator for full details.    PAST MEDICAL HISTORY:  has a past medical history of CATARACT, Colonic polyps, GERD (gastroesophageal reflux disease), Glucose intolerance, Heart burn, Heart murmur, History of fundoplication (2001), Indigestion, IPMN (intraductal papillary mucinous neoplasm) (2007), IPMN (intraductal papillary mucinous neoplasm) (8/18/2015), Migraine, Osteopenia, Other specified disorder of intestines, Pancreatitis chronic, Personal history of venous thrombosis and embolism (2001, 1991), Reactive hypoglycemia, S/P appendectomy (1991), S/P cholecystectomy (2004), S/P hysterectomy with oophorectomy (1990), and Superficial phlebitis of leg.    She has no past medical history of CAD (coronary artery disease), COPD, Liver disease, Psychiatric disorder, or Seizure disorder (HCC).    PAST SURGICAL HISTORY:  has a past surgical history that includes gastroscopy with biopsy (4/25/08); egd w/endoscopic ultrasound (4/25/08); other orthopedic surgery; other abdominal surgery; gyn surgery; appendectomy (1991); egd w/endoscopic ultrasound (10/21/2009); gastroscopy with biopsy (10/21/2009);  gastroscopy with biopsy (11/18/2010); egd w/endoscopic ultrasound (11/18/2010); cataract extraction with iol (11/2010); nissen fundoplication laparoscopic (2002); nissen fundoplication laparoscopic (2003); ercp w/sphincterotomy/papill. (6/7/2011); gastroscopy with biopsy (1/23/2012); and egd w/endoscopic ultrasound (1/23/2012).    ALLERGIES:   Allergies   Allergen Reactions    Metoclopramide Shortness of Breath     Other reaction(s): Other (See Comments), Other (See Comments), Other (See Comments), Other (Specify with Comments), Unspecified  Memory loss  Reglan  Memory loss  Memory loss  Reglan  Reglan  Reglan    Amitriptyline Unspecified     Dry mouth tremor  Imipramine     Dry mouth tremor  Imipramine     Dry mouth tremor  Imipramine       Other reaction(s): Other (See Comments), Other (Specify with Comments), Unspecified  Dry mouth tremor  Imipramine     Dry mouth tremor  Imipramine     Dry mouth tremor  Imipramine     Dry mouth tremor  Imipramine     Dry mouth tremor  Imipramine     Dry mouth tremor  Imipramine       Boniva [Ibandronate Sodium] Unspecified     Severe pain    Carvedilol Vomiting    Ciprofloxacin Hives and Unspecified     Other reaction(s): Other (See Comments)  Not sure why it is here    Denosumab      Severe pain  Other reaction(s): Other (See Comments), Other (Specify with Comments)  Severe prolonged myalgias  Severe pain throughout body.  Severe prolonged myalgias  Severe pain    Gabapentin Unspecified     ??? Side effect  ??? Side effect    Other reaction(s): Other (See Comments), Unspecified  ??? Side effect  ??? Side effect  ??? Side effect  ??? Side effect    Gluten Meal Unspecified    Hydrocodone-Acetaminophen Vomiting and Nausea    Ibandronic Acid      Other reaction(s): Unspecified  Severe pain  Other reaction(s): Other (See Comments), Other (Specify with Comments)  Myalgia  Myalgia  Myalgia      Metformin Unspecified     Blood glucose dropped lower  Blood glucose dropped lower  Blood  "glucose dropped lower    Other reaction(s): Other (See Comments), Other (See Comments), Other (Specify with Comments), Unspecified  Blood glucose dropped lower  Blood glucose dropped lower  Blood glucose dropped lower  Blood glucose dropped lower  Blood glucose dropped lowerBlood glucose dropped lower    Metoclopramide Hcl Unspecified     Reglan  Reglan      Metoprolol Vomiting    Ondansetron Unspecified     Possible atrial fibrillation  Other reaction(s): Arrhythmia  Possible atrial fibrillation  Other reaction(s): Unspecified  Possible atrial fibrillation      Oxycodone-Acetaminophen Unspecified and Vomiting     Other reaction(s): Dizziness  Other reaction(s): Other (See Comments), Unspecified    Paroxetine Hcl Unspecified     Nervous insomnia  Nervous insomnia    Other reaction(s): Other (See Comments), Unspecified  Nervous insomnia  Nervous insomnia  Nervous insomnia  Nervous insomnia    Reglan [Metoclopramide Hcl] Shortness of Breath    Vicodin [Hydrocodone-Acetaminophen] Vomiting and Nausea       CURRENT MEDICATIONS:   Home Medications    **Home medications have not yet been reviewed for this encounter**       FAMILY HISTORY: family history includes Cancer in her father, paternal grandmother, and another family member; Diabetes in her paternal aunt, paternal grandfather, and another family member; Glasses in her father and mother; Heart Disease in her father and another family member; Hypertension in an other family member.    SOCIAL HISTORY:  reports that she has never smoked. She has never used smokeless tobacco. She reports that she does not drink alcohol and does not use drugs.    REVIEW OF SYSTEMS: Comprehensive review of systems is negative with the exception of the aforementioned HPI, PMH, and PSH bullets in accordance with CMS guidelines.    PHYSICAL EXAMINATION:      Vital Signs: BP (!) 148/92   Pulse 95   Temp 37.3 °C (99.1 °F)   Resp (!) 23   Ht 1.575 m (5' 2\")   Wt 47.7 kg (105 lb 2.6 oz)   " SpO2 95%   Physical Exam  Vitals reviewed.   Constitutional:       General: She is not in acute distress.     Appearance: She is not toxic-appearing.   HENT:      Head: Normocephalic.      Comments: Left temporal laceration.     Right Ear: External ear normal.      Left Ear: External ear normal.      Nose: Nose normal.      Mouth/Throat:      Mouth: Mucous membranes are moist.      Pharynx: Oropharynx is clear.   Eyes:      General: No scleral icterus.     Pupils: Pupils are equal, round, and reactive to light.   Cardiovascular:      Rate and Rhythm: Normal rate.   Pulmonary:      Effort: Pulmonary effort is normal. No respiratory distress.      Breath sounds: Normal breath sounds.   Abdominal:      General: There is no distension.      Palpations: Abdomen is soft.      Tenderness: There is no abdominal tenderness. There is no guarding or rebound.   Genitourinary:     Comments: Pelvis stable.  Musculoskeletal:         General: No tenderness or deformity. Normal range of motion.      Cervical back: Normal range of motion and neck supple. No deformity, signs of trauma or tenderness.      Thoracic back: No deformity, signs of trauma or tenderness.      Lumbar back: No deformity, signs of trauma or tenderness.   Skin:     General: Skin is warm and dry.      Capillary Refill: Capillary refill takes less than 2 seconds.      Coloration: Skin is not jaundiced.   Neurological:      General: No focal deficit present.      Mental Status: She is alert and oriented to person, place, and time.      GCS: GCS eye subscore is 4. GCS verbal subscore is 5. GCS motor subscore is 6.   Psychiatric:         Behavior: Behavior is cooperative.         LABORATORY VALUES:   Recent Labs     03/10/24  1934 03/10/24  2014   WBC 8.6 10.3   RBC 4.36 4.37   HEMOGLOBIN 14.2 14.2   HEMATOCRIT 40.6 39.9   MCV 93.1 91.3   MCH 32.6 32.5   MCHC 35.0 35.6*   RDW 38.1 38.4   PLATELETCT 241 207   MPV 9.9 10.4     Recent Labs     03/10/24  1934  03/10/24  2014   SODIUM 135 137   POTASSIUM 3.6 4.3   CHLORIDE 100 100   CO2 24 24   GLUCOSE 113* 111*   BUN 15 14   CREATININE 0.69 0.63   CALCIUM 9.0 8.8     Recent Labs     03/10/24  1934 03/10/24  2014   ASTSGOT  --  32   ALTSGPT  --  17   TBILIRUBIN  --  0.3   ALKPHOSPHAT  --  69   GLOBULIN  --  2.8   INR 1.01 0.94     Recent Labs     03/10/24  1934 03/10/24  2014   APTT 26.5 24.5*   INR 1.01 0.94        IMAGING:   CT-CSPINE WITHOUT PLUS RECONS    (Results Pending)   US-TRAUMA VEIN SCREEN LOWER BILAT EXTREMITY    (Results Pending)   CT-HEAD W/O    (Results Pending)       ASSESSMENT AND PLAN:     Trauma  Ground level fall.  Trauma Yellow Transfer Activation from [facility] in [place], [place].  Faizan Vance MD. Trauma Surgery.    Subdural hematoma, post-traumatic (HCC)  13 mm acute right cerebral hemispheric subdural hematoma with mass effect and 3 mm leftward midline shift.  Definitive plan pending.  Post traumatic pharmacologic seizure prophylaxis for 1 week.  Speech Language Pathology cognitive evaluation.  Solomon Mcguire MD. Neurosurgeon. [facility].    Contraindication to deep vein thrombosis (DVT) prophylaxis  VTE prophylaxis initially contraindicated secondary to elevated bleeding risk.  3/11 Trauma surveillance venous duplex ultrasonography ordered.    Aspirin long-term use  Takes aspirin every other day.   TEG with platelet mapping pending.    Essential hypertension  Chronic condition treated with verapamil SR.  Med rec pending.    DISPOSITION: Trauma ICU.  Interval Trauma tertiary survey.    CRITICAL CARE TIME: My full attention was spent providing medically necessary critical care to the patient, exclusive of time spent on any procedures, for 35 minutes, with details documented in my note.  The patient has acute impairment of one or more vital organ systems and a high probability of imminent or life-threatening deterioration in condition. Provided high complexity  decision making to assess, manipulate, and support vital system functions to treat vital organ system failure and/or to prevent further life-threatening deterioration of the patient's condition. Requires continued ICU and hospital admission.    Critical care interventions include: integration of multiple data points and associated complex medical decision making and management of thrombotic surveillance and risk mitigation.    Faizan GAVIN M.D. performed the substantiative portion of the critical care service face-to-face with the same patient on the same date of service INDEPENDENTLY OF BEKA Pascal, for a total of 35 minutes excluding procedures.  The patient is/remains critically ill.    I directly provided and coordinated critical care and extensive data review in coordination with  BEKA Pascal including the information listed above:      ____________________________________     Faizan Vance M.D.    DD: 3/10/2024  8:23 PM

## 2024-03-11 NOTE — PROGRESS NOTES
Trauma / Surgical Daily Progress Note    Date of Service  3/11/2024    Chief Complaint  83 y.o. female admitted 3/10/2024 with SDH and forehead laceration after GLF.     Interval Events  Interval head CT is stable.   Patient is nauseous and dry heaving.   Episode of A fib with RVR this morning, continue amiodarone drip.     - Compazine added for nausea.   - Q2 neuro checks.   - Plan for craniotomy tomorrow if neuro status declines.   - NPO at midnight.   - Continue ICU care.     Review of Systems  Review of Systems   Constitutional:  Positive for malaise/fatigue. Negative for chills and fever.   Respiratory:  Negative for shortness of breath.    Cardiovascular:  Negative for chest pain.   Gastrointestinal:  Positive for nausea and vomiting. Negative for abdominal pain.        BM 3/11   Musculoskeletal:  Positive for myalgias. Negative for back pain and neck pain.   Neurological:  Positive for headaches. Negative for dizziness, tingling, sensory change, speech change and focal weakness.        Vital Signs  Temp:  [36.6 °C (97.9 °F)-37.3 °C (99.1 °F)] 36.6 °C (97.9 °F)  Pulse:  [] 68  Resp:  [16-64] 25  BP: ()/() 132/79  SpO2:  [91 %-96 %] 96 %    Physical Exam  Physical Exam  Vitals and nursing note reviewed.   Constitutional:       General: She is not in acute distress.     Appearance: She is not ill-appearing.   HENT:      Head: Normocephalic.      Comments: Forehead laceration approximated with sutures with surrounding dried blood and bruising      Mouth/Throat:      Mouth: Mucous membranes are moist.   Eyes:      Extraocular Movements: Extraocular movements intact.      Conjunctiva/sclera: Conjunctivae normal.      Pupils: Pupils are equal, round, and reactive to light.   Cardiovascular:      Rate and Rhythm: Normal rate.   Pulmonary:      Effort: Pulmonary effort is normal. No respiratory distress.   Abdominal:      General: There is no distension.      Palpations: Abdomen is soft.       Tenderness: There is no abdominal tenderness.   Musculoskeletal:         General: No swelling, tenderness or signs of injury.      Cervical back: Normal range of motion and neck supple. No tenderness.      Comments: Moves all extremities    Skin:     General: Skin is warm and dry.   Neurological:      Mental Status: She is alert and oriented to person, place, and time.      GCS: GCS eye subscore is 4. GCS verbal subscore is 5. GCS motor subscore is 6.         Laboratory  Recent Results (from the past 24 hour(s))   EKG    Collection Time: 03/10/24  6:03 PM   Result Value Ref Range    Report       [facility] Emergency Dept.    Test Date:  2024-03-10  Pt Name:    EVER ROYAL               Department: [facility]  MRN:        [id number]                      Room:  Gender:     Female                       Technician: IVANIA  :        [id number]                   Requested By:ER TRIAGE PROTOCOL  Order #:    [id number]                    Reading MD: Bria Moise MD    Measurements  Intervals                                Axis  Rate:       74                           P:          58  HI:         152                          QRS:        -44  QRSD:       98                           T:          49  QT:         414  QTc:        460    Interpretive Statements  Sinus rhythm at a rate of 74 with a PAC no ST elevations or ST depressions no  abnormal T wave inversions normal intervals normal axis  Compared to ECG 2021 15:03:48  no sig change with PAC  Electronically Signed On 03- 19:51:21 PDT by Bria Moise MD     CBC WITH DIFFERENTIAL    Collection Time: 03/10/24  7:34 PM   Result Value Ref Range    WBC 8.6 4.8 - 10.8 K/uL    RBC 4.36 4.20 - 5.40 M/uL    Hemoglobin 14.2 12.0 - 16.0 g/dL    Hematocrit 40.6 37.0 - 47.0 %    MCV 93.1 81.4 - 97.8 fL    MCH 32.6 27.0 - 33.0 pg    MCHC 35.0 32.2 - 35.5 g/dL    RDW 38.1 35.9 - 50.0 fL    Platelet Count 241 164 - 446 K/uL    MPV 9.9 9.0 - 12.9 fL     Neutrophils-Polys 73.90 (H) 44.00 - 72.00 %    Lymphocytes 9.10 (L) 22.00 - 41.00 %    Monocytes 4.80 0.00 - 13.40 %    Eosinophils 11.20 (H) 0.00 - 6.90 %    Basophils 0.60 0.00 - 1.80 %    Immature Granulocytes 0.40 0.00 - 0.90 %    Nucleated RBC 0.00 0.00 - 0.20 /100 WBC    Neutrophils (Absolute) 6.33 1.82 - 7.42 K/uL    Lymphs (Absolute) 0.78 (L) 1.00 - 4.80 K/uL    Monos (Absolute) 0.41 0.00 - 0.85 K/uL    Eos (Absolute) 0.96 (H) 0.00 - 0.51 K/uL    Baso (Absolute) 0.05 0.00 - 0.12 K/uL    Immature Granulocytes (abs) 0.03 0.00 - 0.11 K/uL    NRBC (Absolute) 0.00 K/uL   BASIC METABOLIC PANEL    Collection Time: 03/10/24  7:34 PM   Result Value Ref Range    Sodium 135 135 - 145 mmol/L    Potassium 3.6 3.6 - 5.5 mmol/L    Chloride 100 96 - 112 mmol/L    Co2 24 20 - 33 mmol/L    Glucose 113 (H) 65 - 99 mg/dL    Bun 15 8 - 22 mg/dL    Creatinine 0.69 0.50 - 1.40 mg/dL    Calcium 9.0 8.4 - 10.2 mg/dL    Anion Gap 11.0 7.0 - 16.0   PROTHROMBIN TIME (INR)    Collection Time: 03/10/24  7:34 PM   Result Value Ref Range    PT 13.7 12.0 - 14.6 sec    INR 1.01 0.87 - 1.13   APTT    Collection Time: 03/10/24  7:34 PM   Result Value Ref Range    APTT 26.5 24.7 - 36.0 sec   ESTIMATED GFR    Collection Time: 03/10/24  7:34 PM   Result Value Ref Range    GFR (CKD-EPI) 85 >60 mL/min/1.73 m 2   ABO Rh Confirm    Collection Time: 03/10/24  7:34 PM   Result Value Ref Range    ABO Rh Confirm A POS    COD (ADULT)    Collection Time: 03/10/24  7:48 PM   Result Value Ref Range    ABO Grouping Only A     Rh Grouping Only POS     Antibody Screen-Cod NEG    DIAGNOSTIC ALCOHOL    Collection Time: 03/10/24  8:14 PM   Result Value Ref Range    Diagnostic Alcohol <10.1 <10.1 mg/dL   CBC WITHOUT DIFFERENTIAL    Collection Time: 03/10/24  8:14 PM   Result Value Ref Range    WBC 10.3 4.8 - 10.8 K/uL    RBC 4.37 4.20 - 5.40 M/uL    Hemoglobin 14.2 12.0 - 16.0 g/dL    Hematocrit 39.9 37.0 - 47.0 %    MCV 91.3 81.4 - 97.8 fL    MCH 32.5 27.0 - 33.0  pg    MCHC 35.6 (H) 32.2 - 35.5 g/dL    RDW 38.4 35.9 - 50.0 fL    Platelet Count 207 164 - 446 K/uL    MPV 10.4 9.0 - 12.9 fL   Comp Metabolic Panel    Collection Time: 03/10/24  8:14 PM   Result Value Ref Range    Sodium 137 135 - 145 mmol/L    Potassium 4.3 3.6 - 5.5 mmol/L    Chloride 100 96 - 112 mmol/L    Co2 24 20 - 33 mmol/L    Anion Gap 13.0 7.0 - 16.0    Glucose 111 (H) 65 - 99 mg/dL    Bun 14 8 - 22 mg/dL    Creatinine 0.63 0.50 - 1.40 mg/dL    Calcium 8.8 8.5 - 10.5 mg/dL    Correct Calcium 9.0 8.5 - 10.5 mg/dL    AST(SGOT) 32 12 - 45 U/L    ALT(SGPT) 17 2 - 50 U/L    Alkaline Phosphatase 69 30 - 99 U/L    Total Bilirubin 0.3 0.1 - 1.5 mg/dL    Albumin 3.8 3.2 - 4.9 g/dL    Total Protein 6.6 6.0 - 8.2 g/dL    Globulin 2.8 1.9 - 3.5 g/dL    A-G Ratio 1.4 g/dL   Prothrombin Time    Collection Time: 03/10/24  8:14 PM   Result Value Ref Range    PT 12.7 12.0 - 14.6 sec    INR 0.94 0.87 - 1.13   APTT    Collection Time: 03/10/24  8:14 PM   Result Value Ref Range    APTT 24.5 (L) 24.7 - 36.0 sec   HCG QUAL SERUM    Collection Time: 03/10/24  8:14 PM   Result Value Ref Range    Beta-Hcg Qualitative Serum Negative Negative   PLATELET MAPPING WITH BASIC TEG    Collection Time: 03/10/24  8:14 PM   Result Value Ref Range    Reaction Time Initial-R 3.5 (L) 4.6 - 9.1 min    React Time Initial Hep 3.1 (L) 4.3 - 8.3 min    Clot Kinetics-K 0.9 0.8 - 2.1 min    Clot Angle-Angle 76.9 63.0 - 78.0 degrees    Maximum Clot Strength-MA 64.4 52.0 - 69.0 mm    TEG Functional Fibrinogen(MA) 20.9 15.0 - 32.0 mm    Lysis 30 minutes-LY30 0.3 0.0 - 2.6 %    % Inhibition ADP 12.1 0.0 - 17.0 %    % Inhibition AA 6.7 0.0 - 11.0 %    TEG Algorithm Link Algorithm    COD - Adult (Type and Screen)    Collection Time: 03/10/24  8:14 PM   Result Value Ref Range    ABO Grouping Only A     Rh Grouping Only POS     Antibody Screen-Cod NEG    ESTIMATED GFR    Collection Time: 03/10/24  8:14 PM   Result Value Ref Range    GFR (CKD-EPI) 87 >60  mL/min/1.73 m 2   EKG    Collection Time: 24 12:10 AM   Result Value Ref Range    Report       [facility]    Test Date:  2024  Pt Name:    EVER ROYAL               Department: [facility]  MRN:        [id number]                      [facility]  Gender:     Female                       Technician: KORI  :        [id number]                   Requested By:JAMIE LOPEZ  Order #:    [id number]                    Reading MD: Baldev Cage MD    Measurements  Intervals                                Axis  Rate:       160                          P:          0  UT:         0                            QRS:        -44  QRSD:       88                           T:          125  QT:         248  QTc:        405    Interpretive Statements  Atrial fibrillation with rapid V-rate  Left anterior fascicular block  Abnormal R-wave progression, late transition  Repolarization abnormality, prob rate related  Compared to ECG 03/10/2024 18:03:42  Sinus rhythm no longer present  A  Electronically Signed On 2024 00:56:50 PDT by Baldev Cage MD     CBC with Differential: Tomorrow AM    Collection Time: 24  4:22 AM   Result Value Ref Range    WBC 11.5 (H) 4.8 - 10.8 K/uL    RBC 3.95 (L) 4.20 - 5.40 M/uL    Hemoglobin 12.5 12.0 - 16.0 g/dL    Hematocrit 36.7 (L) 37.0 - 47.0 %    MCV 92.9 81.4 - 97.8 fL    MCH 31.6 27.0 - 33.0 pg    MCHC 34.1 32.2 - 35.5 g/dL    RDW 38.7 35.9 - 50.0 fL    Platelet Count 255 164 - 446 K/uL    MPV 10.2 9.0 - 12.9 fL    Neutrophils-Polys 86.40 (H) 44.00 - 72.00 %    Lymphocytes 6.90 (L) 22.00 - 41.00 %    Monocytes 5.60 0.00 - 13.40 %    Eosinophils 0.40 0.00 - 6.90 %    Basophils 0.30 0.00 - 1.80 %    Immature Granulocytes 0.40 0.00 - 0.90 %    Nucleated RBC 0.00 0.00 - 0.20 /100 WBC    Neutrophils (Absolute) 9.96 (H) 1.82 - 7.42 K/uL    Lymphs (Absolute) 0.79 (L) 1.00 - 4.80 K/uL    Monos (Absolute) 0.64 0.00 - 0.85 K/uL    Eos (Absolute) 0.05 0.00 - 0.51 K/uL    Baso  (Absolute) 0.04 0.00 - 0.12 K/uL    Immature Granulocytes (abs) 0.05 0.00 - 0.11 K/uL    NRBC (Absolute) 0.00 K/uL   Comp Metabolic Panel (CMP): Tomorrow AM    Collection Time: 03/11/24  4:22 AM   Result Value Ref Range    Sodium 139 135 - 145 mmol/L    Potassium 3.3 (L) 3.6 - 5.5 mmol/L    Chloride 107 96 - 112 mmol/L    Co2 19 (L) 20 - 33 mmol/L    Anion Gap 13.0 7.0 - 16.0    Glucose 159 (H) 65 - 99 mg/dL    Bun 11 8 - 22 mg/dL    Creatinine 0.59 0.50 - 1.40 mg/dL    Calcium 7.4 (L) 8.5 - 10.5 mg/dL    Correct Calcium 8.0 (L) 8.5 - 10.5 mg/dL    AST(SGOT) 25 12 - 45 U/L    ALT(SGPT) 14 2 - 50 U/L    Alkaline Phosphatase 58 30 - 99 U/L    Total Bilirubin 0.3 0.1 - 1.5 mg/dL    Albumin 3.2 3.2 - 4.9 g/dL    Total Protein 5.3 (L) 6.0 - 8.2 g/dL    Globulin 2.1 1.9 - 3.5 g/dL    A-G Ratio 1.5 g/dL   Magnesium: Every Monday and Thursday AM    Collection Time: 03/11/24  4:22 AM   Result Value Ref Range    Magnesium 1.4 (L) 1.5 - 2.5 mg/dL   Phosphorus: Every Monday and Thursday AM    Collection Time: 03/11/24  4:22 AM   Result Value Ref Range    Phosphorus 2.4 (L) 2.5 - 4.5 mg/dL   ESTIMATED GFR    Collection Time: 03/11/24  4:22 AM   Result Value Ref Range    GFR (CKD-EPI) 89 >60 mL/min/1.73 m 2   POCT glucose device results    Collection Time: 03/11/24  6:20 AM   Result Value Ref Range    POC Glucose, Blood 140 (H) 65 - 99 mg/dL       Fluids    Intake/Output Summary (Last 24 hours) at 3/11/2024 1529  Last data filed at 3/11/2024 0800  Gross per 24 hour   Intake 1915.41 ml   Output 400 ml   Net 1515.41 ml       Core Measures & Quality Metrics  Labs reviewed, Radiology images reviewed and Medications reviewed  Gaston catheter: No Gaston      DVT Prophylaxis: Contraindicated - High bleeding risk  DVT prophylaxis - mechanical: SCDs  Ulcer prophylaxis: Yes        RAP Score Total: 7    CAGE Results: not completed Blood Alcohol>0.08: no       Assessment/Plan  * Trauma- (present on admission)  Assessment & Plan  Ground level  fall.  Trauma Yellow Transfer Activation from [facility] in [place], [place].  Faizan Vacne MD. Trauma Surgery.    Atrial fibrillation with RVR (HCC)- (present on admission)  Assessment & Plan  3/11 Patient converted to a fib with RVR after being given Zofran and Hydralazine.   - Performed synchronized cardioversion with 2 shocks delivered.    - Amiodarone drip initiated.     Subdural hematoma, post-traumatic (HCC)- (present on admission)  Assessment & Plan  13 mm acute right cerebral hemispheric subdural hematoma with mass effect and 3 mm leftward midline shift.  Interval head CT stable.   No emergent surgical intervention warranted. Plan for joni hole drainage of SDH tomorrow if neuro status declines versus at 2-3 week interval once blood liquefies.   Post traumatic pharmacologic seizure prophylaxis for 1 week.  Speech Language Pathology cognitive evaluation.  Solomon Mcguire MD. Neurosurgeon. [facility].    Aspirin long-term use- (present on admission)  Assessment & Plan  Takes aspirin every other day.   TEG with platelet mapping completed. No reversal indicated    Contraindication to deep vein thrombosis (DVT) prophylaxis- (present on admission)  Assessment & Plan  VTE prophylaxis initially contraindicated secondary to elevated bleeding risk.  3/11 Trauma surveillance venous duplex ultrasonography ordered.    Essential hypertension- (present on admission)  Assessment & Plan  Chronic condition treated with verapamil SR.  Resumed maintenance medication on admission.    Forehead laceration- (present on admission)  Assessment & Plan  Approximated with absorbable sutures in trauma ICU.       Mental status adequate for full examination?: Yes    Spine cleared (radiologically and/or clinically): Yes    All current laboratory studies/radiology exams reviewed: Yes    Medications reconciliation has been reviewed: Yes    Completed Consultations:  Solomon Mcguire MD, Neurosurgery      Pending  Consultations:  None     Newly identified diagnoses, injuries and/or co-morbidities:  None        Discussed patient condition with RN, Patient, trauma surgery, and ICU rounds . Anjali Barnett MD.

## 2024-03-11 NOTE — CARE PLAN
Problem: Knowledge Deficit - Standard  Goal: Patient and family/care givers will demonstrate understanding of plan of care, disease process/condition, diagnostic tests and medications  Outcome: Progressing     Problem: Fall Risk  Goal: Patient will remain free from falls  Outcome: Progressing     Problem: Pain - Standard  Goal: Alleviation of pain or a reduction in pain to the patient’s comfort goal  Outcome: Progressing   The patient is Stable - Low risk of patient condition declining or worsening    Shift Goals  Clinical Goals: stable neuro checks  Patient Goals: rest/heal  Family Goals: rest/heal    Progress made toward(s) clinical / shift goals:  rest    Patient is not progressing towards the following goals: improved nausea

## 2024-03-11 NOTE — ED PROVIDER NOTES
"                                                        ED Provider Note    CHIEF COMPLAINT  No chief complaint on file.       HPI    Primary care provider: Saige Patel M.D.   History obtained from: Patient, EMS and record from transferring facility  History limited by: None     Indy Patel is a 83 y.o. female who presents to the ED by EMS for [facility] ED and was seen on arrival as a trauma yellow activation in conjunction with trauma service.  Appreciate assistance from trauma surgery.  Per patient, she was in the kitchen tonight and was perhaps getting something from the counter when she apparently fell and hit her head.  She believes she may have hit her head on the kitchen counter.  She was seen at [facility] ED where workup including CT head was performed which showed 13 mm right subdural hematoma with mass effect and midline shift and patient transferred here for further care.  She currently denies any pain except for slight headache.  She denies neck pain/chest pain/abdominal pain/back pain or pain in her extremities.  She denies weakness or sensory change/shortness of breath or difficulty breathing/nausea/vomiting.  She is not on blood thinners except for baby aspirin.  She was given tetanus shot at [facility] ED.    REVIEW OF SYSTEMS  Please see HPI for pertinent positives/negatives.  All other systems reviewed and are negative.     PAST MEDICAL HISTORY  Past Medical History:   Diagnosis Date    IPMN (intraductal papillary mucinous neoplasm) 8/18/2015    IPMN (intraductal papillary mucinous neoplasm) 2007    dx @ [facility] / Dr. Palmer doubts    S/P cholecystectomy 2004    History of fundoplication 2001    S/P appendectomy 1991    S/P hysterectomy with oophorectomy 1990    CATARACT     surgically corrected left    Colonic polyps     GERD (gastroesophageal reflux disease)     Glucose intolerance     \"reactive hypoglycemia\"    Heart burn     gerd    Heart murmur     " Indigestion     Migraine     Osteopenia     Other specified disorder of intestines     diarrhea, steatorrhea    Pancreatitis chronic     ?    Personal history of venous thrombosis and embolism 2001, 1991    leg    Reactive hypoglycemia     Superficial phlebitis of leg         SURGICAL HISTORY  Past Surgical History:   Procedure Laterality Date    GASTROSCOPY WITH BIOPSY  1/23/2012    Performed by KIM KIM at [facility]    EGD W/ENDOSCOPIC ULTRASOUND  1/23/2012    Performed by KIM KIM at [facility]    ERCP W/SPHINCTEROTOMY/PAPILL.  6/7/2011    Performed by KIM KIM at [facility]    GASTROSCOPY WITH BIOPSY  11/18/2010    Performed by KIM KIM at [facility] ORS    EGD W/ENDOSCOPIC ULTRASOUND  11/18/2010    Performed by KIM KIM at [facility]    CATARACT EXTRACTION WITH IOL  11/2010    left    EGD W/ENDOSCOPIC ULTRASOUND  10/21/2009    Performed by KIM KIM at [facility]    GASTROSCOPY WITH BIOPSY  10/21/2009    Performed by KIM KIM at [facility]    GASTROSCOPY WITH BIOPSY  4/25/08    Performed by KIM KIM at [facility] ORS    EGD W/ENDOSCOPIC ULTRASOUND  4/25/08    Performed by KIM KIM at [facility]    NISSEN FUNDOPLICATION LAPAROSCOPIC  2003    redo    NISSEN FUNDOPLICATION LAPAROSCOPIC  2002    APPENDECTOMY  1991    GYN SURGERY      hysterectomy, bilateral salpingo-oophorectomies    OTHER ABDOMINAL SURGERY      cholecystectomy, Nissen fundoplication with Toupet reoperation, appendectomy    OTHER ORTHOPEDIC SURGERY      ankle surgery x3        SOCIAL HISTORY  Social History     Tobacco Use    Smoking status: Never    Smokeless tobacco: Never   Vaping Use    Vaping Use: Not on file   Substance and Sexual Activity    Alcohol use: No     Comment: once a month    Drug use: No    Sexual activity: Not on file        FAMILY HISTORY  Family  History   Problem Relation Age of Onset    Glasses Mother     Cancer Father         lung, smoker    Glasses Father     Heart Disease Father     Diabetes Paternal Aunt     Cancer Paternal Grandmother         breast cancer    Diabetes Paternal Grandfather     Diabetes Other     Heart Disease Other     Hypertension Other     Cancer Other         CURRENT MEDICATIONS  Home Medications    **Home medications have not yet been reviewed for this encounter**          ALLERGIES  Allergies   Allergen Reactions    Metoclopramide Shortness of Breath     Other reaction(s): Other (See Comments), Other (See Comments), Other (See Comments), Other (Specify with Comments), Unspecified  Memory loss  Reglan  Memory loss  Memory loss  Reglan  Reglan  Reglan    Amitriptyline Unspecified     Dry mouth tremor  Imipramine     Dry mouth tremor  Imipramine     Dry mouth tremor  Imipramine       Other reaction(s): Other (See Comments), Other (Specify with Comments), Unspecified  Dry mouth tremor  Imipramine     Dry mouth tremor  Imipramine     Dry mouth tremor  Imipramine     Dry mouth tremor  Imipramine     Dry mouth tremor  Imipramine     Dry mouth tremor  Imipramine       Boniva [Ibandronate Sodium] Unspecified     Severe pain    Carvedilol Vomiting    Ciprofloxacin Hives and Unspecified     Other reaction(s): Other (See Comments)  Not sure why it is here    Denosumab      Severe pain  Other reaction(s): Other (See Comments), Other (Specify with Comments)  Severe prolonged myalgias  Severe pain throughout body.  Severe prolonged myalgias  Severe pain    Gabapentin Unspecified     ??? Side effect  ??? Side effect    Other reaction(s): Other (See Comments), Unspecified  ??? Side effect  ??? Side effect  ??? Side effect  ??? Side effect    Gluten Meal Unspecified    Hydrocodone-Acetaminophen Vomiting and Nausea    Ibandronic Acid      Other reaction(s): Unspecified  Severe pain  Other reaction(s): Other (See Comments), Other (Specify with  "Comments)  Myalgia  Myalgia  Myalgia      Metformin Unspecified     Blood glucose dropped lower  Blood glucose dropped lower  Blood glucose dropped lower    Other reaction(s): Other (See Comments), Other (See Comments), Other (Specify with Comments), Unspecified  Blood glucose dropped lower  Blood glucose dropped lower  Blood glucose dropped lower  Blood glucose dropped lower  Blood glucose dropped lowerBlood glucose dropped lower    Metoclopramide Hcl Unspecified     Reglan  Reglan      Metoprolol Vomiting    Ondansetron Unspecified     Possible atrial fibrillation  Other reaction(s): Arrhythmia  Possible atrial fibrillation  Other reaction(s): Unspecified  Possible atrial fibrillation      Oxycodone-Acetaminophen Unspecified and Vomiting     Other reaction(s): Dizziness  Other reaction(s): Other (See Comments), Unspecified    Paroxetine Hcl Unspecified     Nervous insomnia  Nervous insomnia    Other reaction(s): Other (See Comments), Unspecified  Nervous insomnia  Nervous insomnia  Nervous insomnia  Nervous insomnia    Reglan [Metoclopramide Hcl] Shortness of Breath    Vicodin [Hydrocodone-Acetaminophen] Vomiting and Nausea        PHYSICAL EXAM  VITAL SIGNS: BP (!) 164/105   Pulse 98   Temp 36.6 °C (97.9 °F)   Resp 19   Ht 1.575 m (5' 2\")   Wt 47.2 kg (104 lb)   SpO2 95% Comment: RA  BMI 19.02 kg/m²  @ZITA[[id number]::@     Pulse ox interpretation: 95% I interpret this pulse ox as normal     Cardiac monitor interpretation: Sinus rhythm with heart rate in the 90s as interpreted by me.  The patient presented with subdural hematoma and cardiac monitor was ordered to monitor for dysrhythmia.    Constitutional: Well developed, well nourished, alert in no apparent distress, nontoxic appearance    HENT: Laceration and ecchymosis to left forehead above the eyebrow with mild tenderness to palpation, normocephalic, bilateral external ears normal, no hemotympanum bilaterally, oropharynx moist, small superficial " laceration/abrasion on mucosa of upper lip on the left side with mild swelling and ecchymosis, airway patent, nose non TTP with no hematoma/bleeding/drainage, midface stable, no malocclusion, no periorbital swelling/bruising, no mastoid swelling/bruising    Eyes: PERRL, 2 mm bilaterally, conjunctiva without erythema, no discharge, no icterus    Neck: Soft and supple, trachea midline, no stridor, no swelling/bruising, no midline C-spine tenderness, no stepoffs, patient moving her neck without discomfort  Cardiovascular: Regular rate and rhythm, no murmurs/rubs/gallops, strong distal pulses and good perfusion    Thorax & Lungs: No respiratory distress, CTAB with equal BS bilaterally, no chest tenderness/deformity/swelling/crepitus/bruising    Abdomen: Soft, nontender, nondistended, no G/R, no bruising, normal BS, pelvis stable    Back: Normal inspection, no swelling/bruising, no midline TTP, no stepoffs, no CVAT    Extremities: No cyanosis, mild bilateral lower extremity edema, no gross deformity, good ROM at all joints, no tenderness, intact distal pulses with brisk cap refill    Skin: Warm, dry, no pallor/cyanosis, no rash noted    Neuro: A/O times 3, GCS15, no focal deficits noted, sensation intact to touch, equal strength bilateral UE/LE    Psychiatric: Cooperative, normal mood and affect, normal judgement, appropriate for clinical situation        DIAGNOSTIC STUDIES / PROCEDURES        LABS  All labs reviewed by me.     Results for orders placed or performed during the hospital encounter of 03/10/24   CBC WITHOUT DIFFERENTIAL   Result Value Ref Range    WBC 10.3 4.8 - 10.8 K/uL    RBC 4.37 4.20 - 5.40 M/uL    Hemoglobin 14.2 12.0 - 16.0 g/dL    Hematocrit 39.9 37.0 - 47.0 %    MCV 91.3 81.4 - 97.8 fL    MCH 32.5 27.0 - 33.0 pg    MCHC 35.6 (H) 32.2 - 35.5 g/dL    RDW 38.4 35.9 - 50.0 fL    Platelet Count 207 164 - 446 K/uL    MPV 10.4 9.0 - 12.9 fL        RADIOLOGY  I have independently interpreted the diagnostic  imaging associated with this visit and am waiting the final reading from the radiologist.     CT-CSPINE WITHOUT PLUS RECONS    (Results Pending)   US-TRAUMA VEIN SCREEN LOWER BILAT EXTREMITY    (Results Pending)   CT-HEAD W/O    (Results Pending)          COURSE & MEDICAL DECISION MAKING  Nursing notes, VS, PMSFHx reviewed in chart.     Review of past medical records shows the patient was last seen in the office by family medicine on January 26, 2024 regarding follow-up on aspirin intolerance and hypotension due to drugs.  Records from [facility] ED reviewed.      Differential diagnoses considered include but are not limited to: Syncope, Sz, vasovagal episode, dysrhythmia, contusion, intracranial hemorrhage, abrasion/laceration      ED Observation Status? No; Patient does not meet criteria for ED Observation.       Discussion of management with other [facility] or appropriate source(s): Pharmacy, trauma surgeon, neurosurgeon      2021: D/W Dr. Mcguire, neurosurgeon.  He would like a stat TEG performed and if abnormal, patient should be given 1 pack of platelets.  He also recommends Keppra.      History and physical exam as above.  This is a very pleasant 83-year-old female patient with past medical history including osteopenia who presents by EMS as transfer from [facility] ED due to traumatic subdural hematoma on head CT after possible syncopal episode causing head injury and fall.  Patient was seen on arrival in conjunction with trauma service and I appreciate assistance from trauma surgery.  Patient is very alert and without focal neurological findings.  Her GCS is 15.  She has a small left forehead laceration and a small bruise to her upper lip on the left side but otherwise benign exam.  She received tetanus shot at [facility] ED.  Dr. Mcguire consulted with above recommendations.  Patient is aware of the findings and need for admission and close monitoring and she is agreeable.  Patient admitted by Dr. Vance to  the ICU in guarded condition.      FINAL IMPRESSION  1. Traumatic subdural hematoma without loss of consciousness, initial encounter ([facility]) Acute   2. Laceration of forehead, initial encounter Acute   3. Contusion of lip, initial encounter Acute   4. Fall, initial encounter Acute          DISPOSITION  Patient will be admitted by trauma surgeon to ICU in guarded condition      Electronically signed by: Ozzy Hobson D.O., 3/10/2024 8:16 PM      Portions of this record were made with voice recognition software.  Despite my review, errors may remain.  Please interpret this chart in the appropriate context.

## 2024-03-11 NOTE — CONSULTS
Chief complaint right holohemispheric subdural hematoma with acute component  Brief HPI is 83-year-old lady who had a ground-level fall on aspirin with a GCS of 15 on arrival she is a 19 mm right subdural hematoma this holohemispheric with what might be an acute or hyperacute component within it she has 3 mm of midline shift does not complaining of any other symptoms she was admitted directly to the trauma unit at this time she has some coags but no TEG to determine if she has high inhibition from the aspirin.  Given her finding and her age we would recommend the following  Interval CT head in 8 hours  Stat TEG if she has a high inhibition please correct it with platelets  Keppra 500 twice daily  If the patient stabilizes is not having any symptoms we would hold off on drainage of the subdural until it liquefies in 2 to 3 weeks and then drain it with bur holes it is quite large but if she is completely asymptomatic and is controlled this would allow us to make a much smaller surgery if the patient progresses and has an enlarging subdural hematoma she will need to go for a craniotomy given the acute component to the blood this is not going to come out through bur holes alone at this interval.    We will have full note to follow in the morning if there are any issues or drug-drug declines in mental status pleasee notify us of the change.

## 2024-03-11 NOTE — CONSULTS
Chief complaint right-sided acute subdural hematoma  Brief HPI this is a 83-year-old woman who presented to [facility] after ground-level fall where she struck her head she is left-sided facial contusions and then CT head demonstrated acute blood products there holohemispheric on the on the right side of her subdural space causing compression of the brain with 3 mm midline shift she presented GCS 15 with no findings of deficits she is cognitively unimpaired and typically is a independent patient at home with no signs of dementia later this morning we did get a report the patient is starting to have nausea but is not having any lethargy or concerns for underlying increased depression her CT head from 1 AM shows stability of the clot without expansion she was on aspirin therapy but platelets were held due to her AAA in addition to being 6.7.  At this time she is being observed to ensure that she does not have expansion due to osmotic increase in size of the clot if she is stable then we would have her transferred tomorrow to the floor where she can be observed for another 24 hours before discharging home she will likely need to have this clot removed but at this present time she would need to craniectomy for removal of the clot whereas if we can wait a few weeks will liquefy and we will be able to remove it with bur holes which will be safer for the patient with less complications.  She is currently posted for surgery tomorrow in case she does have a decline or starts to become more symptomatic which would include nausea headaches or deficits.    12 point view systems as per HPI  Past surgical history noncontributory  Medications she is on aspirin but is not effective she has a inhibition of 6.7  Past medical history is noncontributory.    Physical examination she is alert she is oriented she is able to answer question appropriately she is GCS 15 this morning however she was starting to become  nauseous this afternoon  Cranial nerves grossly intact  HEENT shows a contusion over the left side of her face  Motor examination is nonfocal no drift.    CT head without contrast follow-up at 115 this morning demonstrates stable right holohemispheric subdural hematoma.    Assessment plan  This time we would change the patient over the every 2 hour neurochecks to avoid delirium  500 of Keppra twice daily  Systolic blood pressures less than 150  Sodiums are we would keep them normal between 135 and 145  Please make patient n.p.o. this evening she can have a normal diet today if she has a decline we would likely either repeat the CT head to see if there is stability or she will be taken to the OR depending on the acuity of the decline she is currently posted for surgery tomorrow as a space simon if she has increased symptoms tomorrow we will take her for drainage of the subdural hematoma.    Total of 50 minutes of direct patient care coordination consultation evaluation

## 2024-03-11 NOTE — DISCHARGE PLANNING
Medical Social Work    Referral: Trauma Yellow Transfer    Intervention: Pt is an 83 year old female brought in by DEREJE from [facility] after a fall.  Pt is Indy Patel (: 1940).  Per chart pt's family is aware and pt arrives alert and oriented.  Per chart pt's emergency contact is her daughter, Skylar ([phone]).  Pt to [id number].    Plan: SW will follow.

## 2024-03-11 NOTE — ED TRIAGE NOTES
"Chief Complaint   Patient presents with    Head Injury     Pt. Does not recall what caused her to fall. Reports hitting head on \"the kitchen counter\" and presents with large lac and hematoma to L forehead. Bleeding controlled. Visitor contributes that pt. Is repeating herself and seems confused. Pt. Denies blood thinners. GCS is 15.      Physical Exam  Pulmonary:      Effort: Pulmonary effort is normal.   Skin:     General: Skin is warm and dry.   Neurological:      Mental Status: She is alert.         "

## 2024-03-11 NOTE — ASSESSMENT & PLAN NOTE
Ground level fall.  Trauma Yellow Transfer Activation from [facility] in [place], [place].  Faizan Vance MD. Trauma Surgery.

## 2024-03-11 NOTE — ASSESSMENT & PLAN NOTE
VTE prophylaxis initially contraindicated secondary to elevated bleeding risk.  3/11 Trauma surveillance venous duplex ultrasonography ordered.

## 2024-03-11 NOTE — PROCEDURES
DATE OF OPERATION:  3/10/2024    PROCEDURE PERFORMED: Simple repair of laceration, (single layer, 5 cm aggregate length).     APRN:    ESTELA Pascal    INDICATIONS: The patient is a 83 year-old elderly woman with forehead laceration.     PROCEDURE: Following informed consent consent, the patient was properly identified and optimally positioned. Intravenous antibiotics were not administered to this low risk patient. The operative site was infiltrated with local anesthetic, irrigated, and prepped into a sterile field.   The skin was approximated with with interrupted 5-0 Fast absorbing gut sutures. Antibiotic ointment was applied to the wound.    The patient tolerated the procedure well. There were no apparent complications.         ____________________________________     ESTELA Pascal    DD: 3/10/2024  11:01 PM

## 2024-03-11 NOTE — PROGRESS NOTES
Med rec is complete per Patient at bedside with family.   Pt does not fill with [facility].    Unable to review allergies at this time.    Has patient had any outside antibiotics in the last 30 days? N    Any Anticoagulants (rivaroxaban, apixaban, edoxaban, dabigatran, warfarin, enoxaparin) taken in the last 14 days? N         Pharmacy/Pharmacies Pt utilizes : [facility] [phone].

## 2024-03-12 LAB
ALBUMIN SERPL BCP-MCNC: 3.3 G/DL (ref 3.2–4.9)
ALBUMIN/GLOB SERPL: 1.6 G/DL
ALP SERPL-CCNC: 60 U/L (ref 30–99)
ALT SERPL-CCNC: 22 U/L (ref 2–50)
ANION GAP SERPL CALC-SCNC: 10 MMOL/L (ref 7–16)
AST SERPL-CCNC: 31 U/L (ref 12–45)
BASOPHILS # BLD AUTO: 0.3 % (ref 0–1.8)
BASOPHILS # BLD: 0.02 K/UL (ref 0–0.12)
BILIRUB SERPL-MCNC: 0.3 MG/DL (ref 0.1–1.5)
BUN SERPL-MCNC: 5 MG/DL (ref 8–22)
CALCIUM ALBUM COR SERPL-MCNC: 8.8 MG/DL (ref 8.5–10.5)
CALCIUM SERPL-MCNC: 8.2 MG/DL (ref 8.5–10.5)
CHLORIDE SERPL-SCNC: 105 MMOL/L (ref 96–112)
CO2 SERPL-SCNC: 21 MMOL/L (ref 20–33)
CREAT SERPL-MCNC: 0.5 MG/DL (ref 0.5–1.4)
EOSINOPHIL # BLD AUTO: 0.08 K/UL (ref 0–0.51)
EOSINOPHIL NFR BLD: 1.1 % (ref 0–6.9)
ERYTHROCYTE [DISTWIDTH] IN BLOOD BY AUTOMATED COUNT: 38.7 FL (ref 35.9–50)
GFR SERPLBLD CREATININE-BSD FMLA CKD-EPI: 92 ML/MIN/1.73 M 2
GLOBULIN SER CALC-MCNC: 2.1 G/DL (ref 1.9–3.5)
GLUCOSE SERPL-MCNC: 112 MG/DL (ref 65–99)
HCT VFR BLD AUTO: 34.6 % (ref 37–47)
HGB BLD-MCNC: 12.4 G/DL (ref 12–16)
IMM GRANULOCYTES # BLD AUTO: 0.05 K/UL (ref 0–0.11)
IMM GRANULOCYTES NFR BLD AUTO: 0.7 % (ref 0–0.9)
LYMPHOCYTES # BLD AUTO: 1.08 K/UL (ref 1–4.8)
LYMPHOCYTES NFR BLD: 14.4 % (ref 22–41)
MAGNESIUM SERPL-MCNC: 2.2 MG/DL (ref 1.5–2.5)
MCH RBC QN AUTO: 32.8 PG (ref 27–33)
MCHC RBC AUTO-ENTMCNC: 35.8 G/DL (ref 32.2–35.5)
MCV RBC AUTO: 91.5 FL (ref 81.4–97.8)
MONOCYTES # BLD AUTO: 0.76 K/UL (ref 0–0.85)
MONOCYTES NFR BLD AUTO: 10.1 % (ref 0–13.4)
NEUTROPHILS # BLD AUTO: 5.53 K/UL (ref 1.82–7.42)
NEUTROPHILS NFR BLD: 73.4 % (ref 44–72)
NRBC # BLD AUTO: 0 K/UL
NRBC BLD-RTO: 0 /100 WBC (ref 0–0.2)
PHOSPHATE SERPL-MCNC: 2.6 MG/DL (ref 2.5–4.5)
PLATELET # BLD AUTO: 211 K/UL (ref 164–446)
PMV BLD AUTO: 10 FL (ref 9–12.9)
POTASSIUM SERPL-SCNC: 3.7 MMOL/L (ref 3.6–5.5)
PROT SERPL-MCNC: 5.4 G/DL (ref 6–8.2)
RBC # BLD AUTO: 3.78 M/UL (ref 4.2–5.4)
SODIUM SERPL-SCNC: 136 MMOL/L (ref 135–145)
WBC # BLD AUTO: 7.5 K/UL (ref 4.8–10.8)

## 2024-03-12 PROCEDURE — 700102 HCHG RX REV CODE 250 W/ 637 OVERRIDE(OP): Performed by: NURSE PRACTITIONER

## 2024-03-12 PROCEDURE — 84100 ASSAY OF PHOSPHORUS: CPT

## 2024-03-12 PROCEDURE — 770020 HCHG ROOM/CARE - TELE (206)

## 2024-03-12 PROCEDURE — 83735 ASSAY OF MAGNESIUM: CPT

## 2024-03-12 PROCEDURE — 700105 HCHG RX REV CODE 258: Performed by: SURGERY

## 2024-03-12 PROCEDURE — 700111 HCHG RX REV CODE 636 W/ 250 OVERRIDE (IP): Performed by: PHYSICIAN ASSISTANT

## 2024-03-12 PROCEDURE — 700111 HCHG RX REV CODE 636 W/ 250 OVERRIDE (IP): Performed by: SURGERY

## 2024-03-12 PROCEDURE — A9270 NON-COVERED ITEM OR SERVICE: HCPCS | Performed by: NURSE PRACTITIONER

## 2024-03-12 PROCEDURE — 700111 HCHG RX REV CODE 636 W/ 250 OVERRIDE (IP): Mod: JZ | Performed by: SURGERY

## 2024-03-12 PROCEDURE — 700102 HCHG RX REV CODE 250 W/ 637 OVERRIDE(OP): Performed by: PHYSICIAN ASSISTANT

## 2024-03-12 PROCEDURE — 80053 COMPREHEN METABOLIC PANEL: CPT

## 2024-03-12 PROCEDURE — 700111 HCHG RX REV CODE 636 W/ 250 OVERRIDE (IP): Mod: JZ | Performed by: NURSE PRACTITIONER

## 2024-03-12 PROCEDURE — A9270 NON-COVERED ITEM OR SERVICE: HCPCS | Performed by: PHYSICIAN ASSISTANT

## 2024-03-12 PROCEDURE — 85025 COMPLETE CBC W/AUTO DIFF WBC: CPT

## 2024-03-12 PROCEDURE — 99239 HOSP IP/OBS DSCHRG MGMT >30: CPT | Performed by: PHYSICIAN ASSISTANT

## 2024-03-12 RX ORDER — CHOLESTYRAMINE 4 G/9G
4 POWDER, FOR SUSPENSION ORAL DAILY
Status: DISCONTINUED | OUTPATIENT
Start: 2024-03-12 | End: 2024-03-13 | Stop reason: HOSPADM

## 2024-03-12 RX ORDER — POTASSIUM CHLORIDE 7.45 MG/ML
10 INJECTION INTRAVENOUS
Status: COMPLETED | OUTPATIENT
Start: 2024-03-12 | End: 2024-03-12

## 2024-03-12 RX ADMIN — CHOLESTYRAMINE 4 G: 4 POWDER, FOR SUSPENSION ORAL at 12:00

## 2024-03-12 RX ADMIN — Medication 1 APPLICATOR: at 06:00

## 2024-03-12 RX ADMIN — TRAZODONE HYDROCHLORIDE 50 MG: 100 TABLET ORAL at 21:00

## 2024-03-12 RX ADMIN — LEVETIRACETAM 500 MG: 100 INJECTION, SOLUTION, CONCENTRATE INTRAVENOUS at 05:03

## 2024-03-12 RX ADMIN — POTASSIUM CHLORIDE 10 MEQ: 7.46 INJECTION, SOLUTION INTRAVENOUS at 08:56

## 2024-03-12 RX ADMIN — PROCHLORPERAZINE EDISYLATE 10 MG: 5 INJECTION INTRAMUSCULAR; INTRAVENOUS at 05:05

## 2024-03-12 RX ADMIN — VERAPAMIL HYDROCHLORIDE 120 MG: 240 TABLET, FILM COATED, EXTENDED RELEASE ORAL at 05:03

## 2024-03-12 RX ADMIN — PROCHLORPERAZINE EDISYLATE 10 MG: 5 INJECTION INTRAMUSCULAR; INTRAVENOUS at 16:20

## 2024-03-12 RX ADMIN — POTASSIUM CHLORIDE 10 MEQ: 7.46 INJECTION, SOLUTION INTRAVENOUS at 11:10

## 2024-03-12 RX ADMIN — SODIUM CHLORIDE: 9 INJECTION, SOLUTION INTRAVENOUS at 21:47

## 2024-03-12 RX ADMIN — AMIODARONE HYDROCHLORIDE 0.5 MG/MIN: 1.8 INJECTION, SOLUTION INTRAVENOUS at 04:35

## 2024-03-12 RX ADMIN — LEVETIRACETAM 500 MG: 500 TABLET, FILM COATED ORAL at 18:02

## 2024-03-12 ASSESSMENT — ENCOUNTER SYMPTOMS
MYALGIAS: 1
FOCAL WEAKNESS: 0
CHILLS: 0
BACK PAIN: 0
HEADACHES: 1
DIZZINESS: 0
FEVER: 0
SPEECH CHANGE: 0
ABDOMINAL PAIN: 0
VOMITING: 1
TINGLING: 0
SENSORY CHANGE: 0
NECK PAIN: 0
ROS GI COMMENTS: BM 3/11
NAUSEA: 1
SHORTNESS OF BREATH: 0

## 2024-03-12 ASSESSMENT — PAIN DESCRIPTION - PAIN TYPE
TYPE: ACUTE PAIN

## 2024-03-12 NOTE — PROGRESS NOTES
Neurosurgery Progress Note    Subjective:   Headache controlled on meds, persistent nausea but no dry heaving this am    Exam:  A&O  EOM intact, PERRL, facial symmetry  ALONSO with FS  No drift    BP  Min: 116/67  Max: 169/77  Pulse  Av.9  Min: 57  Max: 85  Resp  Av.9  Min: 16  Max: 51  Temp  Av.9 °C (98.5 °F)  Min: 36.7 °C (98 °F)  Max: 37.2 °C (98.9 °F)  SpO2  Av.2 %  Min: 90 %  Max: 96 %    No data recorded    Recent Labs     03/10/24  2014 03/11/24  0422 03/12/24  0517   WBC 10.3 11.5* 7.5   RBC 4.37 3.95* 3.78*   HEMOGLOBIN 14.2 12.5 12.4   HEMATOCRIT 39.9 36.7* 34.6*   MCV 91.3 92.9 91.5   MCH 32.5 31.6 32.8   MCHC 35.6* 34.1 35.8*   RDW 38.4 38.7 38.7   PLATELETCT 207 255 211   MPV 10.4 10.2 10.0     Recent Labs     03/10/24  2014 03/11/24  0422 24  0517   SODIUM 137 139 136   POTASSIUM 4.3 3.3* 3.7   CHLORIDE 100 107 105   CO2 24 19* 21   GLUCOSE 111* 159* 112*   BUN 14 11 5*   CREATININE 0.63 0.59 0.50   CALCIUM 8.8 7.4* 8.2*     Recent Labs     03/10/24  1934 03/10/24  2014   APTT 26.5 24.5*   INR 1.01 0.94     Recent Labs     03/10/24  2014   REACTMIN 3.5*   CLOTKINET 0.9   CLOTANGL 76.9   MAXCLOTS 64.4   WEL24HQW 0.3   PRCINADP 12.1   PRCINAA 6.7       Intake/Output                         24 - 2459 24 - 2459      Total 0818-9870 9783-3300 Total                 Intake    I.V.  815.5  721 1536.5  --  -- --    Magnesium Sulfate Volume 43.5 -- 43.5 -- -- --    Amiodarone Volume 174.9 128.8 303.8 -- -- --    Volume (mL) (NS infusion) 297.1 353.1 650.2 -- -- --    Volume (mL) (dextrose 5% infusion) 300 239 539.1 -- -- --    IV Piggyback  497.7  -- 497.7  --  -- --    Volume (mL) (potassium phosphate 30 mmol in  mL ivpb) 497.7 -- 497.7 -- -- --    Total Intake 1313.2 721 2034.2 -- -- --       Output    Urine  400  -- 400  --  -- --    Number of Times Voided -- 2 x 2 x -- -- --    Urine Void (mL) 400 -- 400 -- -- --    Stool   --  -- --  --  -- --    Number of Times Stooled 1 x -- 1 x -- -- --    Total Output 400 -- 400 -- -- --       Net I/O     913.2 721 1634.2 -- -- --              Intake/Output Summary (Last 24 hours) at 3/12/2024 1007  Last data filed at 3/12/2024 0000  Gross per 24 hour   Intake 1842.41 ml   Output --   Net 1842.41 ml             Nozin nasal  swab  1 Applicator BID    potassium chloride  10 mEq Q HOUR    dextrose 5%   Continuous    amiodarone infusion  0.5 mg/min Continuous    labetalol  10 mg Q6HRS PRN    prochlorperazine  10 mg Q6HRS PRN    traZODone  50 mg QHS PRN    verapamil SR  120 mg QAM    Respiratory Therapy Consult   Continuous RT    Pharmacy Consult Request  1 Each PHARMACY TO DOSE    docusate sodium  100 mg BID    senna-docusate  1 Tablet Nightly    senna-docusate  1 Tablet Q24HRS PRN    polyethylene glycol/lytes  1 Packet BID    magnesium hydroxide  30 mL DAILY    bisacodyl  10 mg Q24HRS PRN    sodium phosphate  1 Each Once PRN    NS   Continuous    acetaminophen  650 mg Q4HRS PRN    fentaNYL  25 mcg Q2HRS PRN    levETIRAcetam  500 mg Q12HRS    Or    levETIRAcetam (Keppra) IV  500 mg Q12HRS       Assessment and Plan:  Hospital day #2 SDH  POD #na  Prophylactic anticoagulation: no         Start date/time: tbd please mobilize     Brain Compression: Yes Traumatic    Neuro exam stable  Continue pain control, antiemetics  Repeat head CT demonstrates stability of large SDH  Continue close monitoring in ICU, may need joni hole craniotomy if any neuro decline  Keppra x7d  No nsaids, asa, anticoags

## 2024-03-12 NOTE — CARE PLAN
The patient is Stable - Low risk of patient condition declining or worsening    Shift Goals  Clinical Goals: stable neuro checks  Patient Goals: rest/heal  Family Goals: rest/heal      Problem: Fall Risk  Goal: Patient will remain free from falls  Outcome: Progressing     Problem: Knowledge Deficit - Standard  Goal: Patient and family/care givers will demonstrate understanding of plan of care, disease process/condition, diagnostic tests and medications  Outcome: Progressing     Problem: Pain - Standard  Goal: Alleviation of pain or a reduction in pain to the patient’s comfort goal  Outcome: Progressing     Problem: Skin Integrity  Goal: Skin integrity is maintained or improved  Outcome: Progressing

## 2024-03-12 NOTE — DOCUMENTATION QUERY
[facility]                                                                       Query Response Note      PATIENT:               EVER ROYAL  ACCT #:                  [id number]  MRN:                     [id number]  :                      1940  ADMIT DATE:       3/10/2024 8:02 PM  DISCH DATE:          RESPONDING  PROVIDER #:        [id number]           QUERY TEXT:    A laceration/wound repair procedure was documented in the Medical Record.    Please describe the deepest level repaired:    Thank you.      The patient's Clinical Indicators include:  3/10 Procedure: Forehead laceration repair.    Risk factor: Laceration    Treatment: Laceration repair    Thank you,  Domenica Cortez NP, CCDS   Clinical   Connect via [employer]  Options provided:   -- Skin and subcutaneous tissue   -- Skin only   -- Other explanation, Please specify      Query created by: Domenica Cortez on 3/11/2024 6:01 AM    RESPONSE TEXT:    Skin only          Electronically signed by:  JAJA FULLER 3/11/2024 9:32 PM

## 2024-03-12 NOTE — PROGRESS NOTES
Date of Service  3/12/2024    Chief Complaint  83 y.o. female admitted 3/10/2024 with SDH and forehead laceration after GLF.     Interval Events  Neuro examination stable.   Nausea somewhat improved with compazine.     - Initiate clear liquid diet and ADAT.   - Amiodarone drip discontinued.   - Compazine added for nausea.   - Q4 neuro checks.   - PT/OT for mobilization.  - Medically cleared for transfer to floor.     Review of Systems  Review of Systems   Constitutional:  Positive for malaise/fatigue. Negative for chills and fever.   Respiratory:  Negative for shortness of breath.    Cardiovascular:  Negative for chest pain.   Gastrointestinal:  Positive for nausea and vomiting. Negative for abdominal pain.        BM 3/11   Musculoskeletal:  Positive for myalgias. Negative for back pain and neck pain.   Neurological:  Positive for headaches. Negative for dizziness, tingling, sensory change, speech change and focal weakness.        Vital Signs  Temp:  [36.7 °C (98 °F)-37.2 °C (98.9 °F)] 37.1 °C (98.7 °F)  Pulse:  [56-85] 76  Resp:  [16-53] 43  BP: (116-169)/(62-96) 131/96  SpO2:  [90 %-95 %] 94 %    Physical Exam  Physical Exam  Vitals and nursing note reviewed.   Constitutional:       General: She is not in acute distress.     Appearance: She is not ill-appearing.   HENT:      Head: Normocephalic.      Comments: Forehead laceration approximated with sutures with surrounding bruising      Mouth/Throat:      Mouth: Mucous membranes are moist.   Eyes:      Extraocular Movements: Extraocular movements intact.      Conjunctiva/sclera: Conjunctivae normal.      Pupils: Pupils are equal, round, and reactive to light.   Cardiovascular:      Rate and Rhythm: Normal rate.   Pulmonary:      Effort: Pulmonary effort is normal. No respiratory distress.   Abdominal:      General: There is no distension.      Palpations: Abdomen is soft.      Tenderness: There is no abdominal tenderness.   Musculoskeletal:         General:  No swelling, tenderness or signs of injury.      Cervical back: Normal range of motion and neck supple. No tenderness.      Comments: Moves all extremities    Skin:     General: Skin is warm and dry.   Neurological:      Mental Status: She is alert and oriented to person, place, and time.      GCS: GCS eye subscore is 4. GCS verbal subscore is 5. GCS motor subscore is 6.         Laboratory  Recent Results (from the past 24 hour(s))   CBC with Differential: Tomorrow AM    Collection Time: 03/12/24  5:17 AM   Result Value Ref Range    WBC 7.5 4.8 - 10.8 K/uL    RBC 3.78 (L) 4.20 - 5.40 M/uL    Hemoglobin 12.4 12.0 - 16.0 g/dL    Hematocrit 34.6 (L) 37.0 - 47.0 %    MCV 91.5 81.4 - 97.8 fL    MCH 32.8 27.0 - 33.0 pg    MCHC 35.8 (H) 32.2 - 35.5 g/dL    RDW 38.7 35.9 - 50.0 fL    Platelet Count 211 164 - 446 K/uL    MPV 10.0 9.0 - 12.9 fL    Neutrophils-Polys 73.40 (H) 44.00 - 72.00 %    Lymphocytes 14.40 (L) 22.00 - 41.00 %    Monocytes 10.10 0.00 - 13.40 %    Eosinophils 1.10 0.00 - 6.90 %    Basophils 0.30 0.00 - 1.80 %    Immature Granulocytes 0.70 0.00 - 0.90 %    Nucleated RBC 0.00 0.00 - 0.20 /100 WBC    Neutrophils (Absolute) 5.53 1.82 - 7.42 K/uL    Lymphs (Absolute) 1.08 1.00 - 4.80 K/uL    Monos (Absolute) 0.76 0.00 - 0.85 K/uL    Eos (Absolute) 0.08 0.00 - 0.51 K/uL    Baso (Absolute) 0.02 0.00 - 0.12 K/uL    Immature Granulocytes (abs) 0.05 0.00 - 0.11 K/uL    NRBC (Absolute) 0.00 K/uL   Comp Metabolic Panel (CMP): Tomorrow AM    Collection Time: 03/12/24  5:17 AM   Result Value Ref Range    Sodium 136 135 - 145 mmol/L    Potassium 3.7 3.6 - 5.5 mmol/L    Chloride 105 96 - 112 mmol/L    Co2 21 20 - 33 mmol/L    Anion Gap 10.0 7.0 - 16.0    Glucose 112 (H) 65 - 99 mg/dL    Bun 5 (L) 8 - 22 mg/dL    Creatinine 0.50 0.50 - 1.40 mg/dL    Calcium 8.2 (L) 8.5 - 10.5 mg/dL    Correct Calcium 8.8 8.5 - 10.5 mg/dL    AST(SGOT) 31 12 - 45 U/L    ALT(SGPT) 22 2 - 50 U/L    Alkaline Phosphatase 60 30 - 99 U/L    Total  Bilirubin 0.3 0.1 - 1.5 mg/dL    Albumin 3.3 3.2 - 4.9 g/dL    Total Protein 5.4 (L) 6.0 - 8.2 g/dL    Globulin 2.1 1.9 - 3.5 g/dL    A-G Ratio 1.6 g/dL   ESTIMATED GFR    Collection Time: 03/12/24  5:17 AM   Result Value Ref Range    GFR (CKD-EPI) 92 >60 mL/min/1.73 m 2   MAGNESIUM    Collection Time: 03/12/24  5:17 AM   Result Value Ref Range    Magnesium 2.2 1.5 - 2.5 mg/dL   PHOSPHORUS    Collection Time: 03/12/24  5:17 AM   Result Value Ref Range    Phosphorus 2.6 2.5 - 4.5 mg/dL       Fluids    Intake/Output Summary (Last 24 hours) at 3/12/2024 1255  Last data filed at 3/12/2024 1000  Gross per 24 hour   Intake 2813.09 ml   Output --   Net 2813.09 ml       Core Measures & Quality Metrics  Labs reviewed, Radiology images reviewed and Medications reviewed  Gaston catheter: No Gaston      DVT Prophylaxis: Contraindicated - High bleeding risk  DVT prophylaxis - mechanical: SCDs  Ulcer prophylaxis: Yes        RAP Score Total: 7    CAGE Results: not completed Blood Alcohol>0.08: no       Assessment/Plan  * Trauma- (present on admission)  Assessment & Plan  Ground level fall.  Trauma Yellow Transfer Activation from [facility] [place].  Faizan Vance MD. Trauma Surgery.    Atrial fibrillation with RVR (HCC)- (present on admission)  Assessment & Plan  3/11 Patient converted to a fib with RVR after being given Zofran and Hydralazine.   - Performed synchronized cardioversion with 2 shocks delivered.    - Amiodarone drip initiated.     Subdural hematoma, post-traumatic (HCC)- (present on admission)  Assessment & Plan  13 mm acute right cerebral hemispheric subdural hematoma with mass effect and 3 mm leftward midline shift.  Interval head CT stable.   No emergent surgical intervention warranted. Plan for joni hole drainage of SDH tomorrow if neuro status declines versus at 2-3 week interval once blood liquefies.   Post traumatic pharmacologic seizure prophylaxis for 1 week.  Speech Language  Pathology cognitive evaluation.  Solomon Mcguire MD. Neurosurgeon. [facility].    Aspirin long-term use- (present on admission)  Assessment & Plan  Takes aspirin every other day.   TEG with platelet mapping completed. No reversal indicated    Contraindication to deep vein thrombosis (DVT) prophylaxis- (present on admission)  Assessment & Plan  VTE prophylaxis initially contraindicated secondary to elevated bleeding risk.  3/11 Trauma surveillance venous duplex ultrasonography ordered.    Essential hypertension- (present on admission)  Assessment & Plan  Chronic condition treated with verapamil SR.  Resumed maintenance medication on admission.    Forehead laceration- (present on admission)  Assessment & Plan  Approximated with absorbable sutures in trauma ICU.       Discussed patient condition with RN, Patient, trauma surgery, and ICU rounds . Anjali Barnett MD.

## 2024-03-12 NOTE — PROGRESS NOTES
4 Eyes Skin Assessment Completed by JALIL Hernandez and JALIL Ramirez.    Head Bruising laceration with sutures to left side of forehead/cheek area.   Ears WDL  Nose WDL  Mouth WDL  Neck WDL  Breast/Chest WDL  Shoulder Blades WDL  Spine WDL  (R) Arm/Elbow/Hand WDL  (L) Arm/Elbow/Hand Bruising  Abdomen WDL  Groin WDL  Scrotum/Coccyx/Buttocks WDL  (R) Leg WDL  (L) Leg WDL  (R) Heel/Foot/Toe Redness and Blanching  (L) Heel/Foot/Toe Redness and Blanching          Devices In Places Tele Box, Blood Pressure Cuff, Pulse Ox, and SCD's      Interventions In Place Pressure Redistribution Mattress    Possible Skin Injury No    Pictures Uploaded Into Epic N/A  Wound Consult Placed N/A  RN Wound Prevention Protocol Ordered No

## 2024-03-12 NOTE — PROGRESS NOTES
Pt transferred to S1 via gurney with transport. All belongings accounted for. Report given to JALIL Heredia. Daughter called and updated. VSS.

## 2024-03-12 NOTE — PROGRESS NOTES
At 2130 language line was used to discuss plan of care, educate fall risk, explain a hemoglobin blood draw, and answer questions pt might have. Pt had no questions and verbalized understanding of education, care plan, and blood draws.

## 2024-03-12 NOTE — DISCHARGE INSTRUCTIONS
- Call or seek medical attention for questions or concerns  - Follow up with the [facility] RETURN: PRN  - Follow up with Dr. Solomon Mcguire, neurosurgery in 6 weeks time, avoid all blood thinners including aspirin or NSAIDs (ibuprophen, Advil, Aleve, Motrin)  - Follow up with primary care provider within one weeks time  - Resume regular diet  - May take over the counter acetaminophen as needed for pain  - Continue daily over the counter stool softener while on narcotics  - No operation of machinery or motorized vehicles while under the influence of narcotics  - No alcohol, marijuana or illicit drug use while under the influence of narcotics  - In the event of a narcotic overdose naloxone (Narcan) is available without a prescription from any [facility]  - No swimming, hot tubs, baths or wound submersion until cleared by outpatient provider. May shower  - No contact sports, strenuous activities, or heavy lifting until cleared by outpatient provider  - If respiratory decompensation, persistent or worsening pain, neurological deficits, or signs or symptoms of infection occur seek medical attention

## 2024-03-12 NOTE — DISCHARGE SUMMARY
Trauma Discharge Summary    DATE OF ADMISSION: 3/10/2024    DATE OF DISCHARGE: 3/13/2024    LENGTH OF STAY: 3 days     ATTENDING PHYSICIAN: Faizan Vance MD     CONSULTING PHYSICIAN:   Luciano. Solomon Mcguire MD, Neurosurgery     DISCHARGE DIAGNOSIS:  Principal Problem:    Trauma  Active Problems:    Subdural hematoma, post-traumatic (HCC)    Essential hypertension    Contraindication to deep vein thrombosis (DVT) prophylaxis    Aspirin long-term use    Atrial fibrillation with RVR (HCC)    Forehead laceration  Resolved Problems:    * No resolved hospital problems. *      PROCEDURES:  None     HISTORY OF PRESENT ILLNESS: The patient is a 83 y.o. female who was reportedly injured in a ground level fall. She was initially evaluated at [facility] and transferred to [facility] in [place], [place] for definitive trauma care.    HOSPITAL COURSE: The patient was triaged as a trauma yellow transfer activation.  Review of referring facility imaging demonstrated acute right subdural hematoma.  The patient was admitted to the trauma ICU.  Neurosurgery was consulted and the patient was treated nonoperatively.  Interval head CT was stable. She has a forehead laceration that was repaired at bedside in ICU with absorbable sutures. The patient developed atrial fibrillation with RVR after hydralazine and Zofran were administered. An amiodarone drip was initiated and the patient underwent synchronized cardioversion with 2 shocks given. She reverted back to NSR and was restarted on home Verapamil. Amiodarone drip was discontinued prior to transfer to the talley. She worked with physical and occupation therapy and will return home with her daughter.    On day of discharge, her neurological examination remains normal, she is tolerating a regular diet, ambulating with therapies, and pain controlled. She will continue on Keppra for seizure prophylaxis until follow up with neurosurgery in 1 month.  Neurosurgery plans for repeat CT at 1 month to evaluate possibility for evacuation of subdural through joni holes in a non-emergent setting.     HOSPITAL PROBLEM LIST:  * Trauma- (present on admission)  Assessment & Plan  Ground level fall.  Trauma Yellow Transfer Activation from [facility] in [place], [place].  Faizan Vance MD. Trauma Surgery.    Subdural hematoma, post-traumatic (HCC)- (present on admission)  Assessment & Plan  13 mm acute right cerebral hemispheric subdural hematoma with mass effect and 3 mm leftward midline shift.  Interval head CT stable.   No emergent surgical intervention warranted.   Post traumatic pharmacologic seizure prophylaxis for 1 week.  Speech Language Pathology cognitive evaluation.  Follow-up in 1 month for evaluation of possible subdural evacuation through joni holes once blood liquefies.   Solomon Mcguire MD. Neurosurgeon. [facility].    Atrial fibrillation with RVR (HCC)- (present on admission)  Assessment & Plan  3/11 Patient converted to a fib with RVR after being given Zofran and Hydralazine.   - Performed synchronized cardioversion with 2 shocks delivered.    - Amiodarone drip initiated.   3/12 Amiodarone drip discontinued. Home Verapamil restarted 3/11.   - Remote tele monitoring on the talley.     Aspirin long-term use- (present on admission)  Assessment & Plan  Takes aspirin every other day.   TEG with platelet mapping completed. No reversal indicated    Contraindication to deep vein thrombosis (DVT) prophylaxis- (present on admission)  Assessment & Plan  VTE prophylaxis initially contraindicated secondary to elevated bleeding risk.  3/11 Trauma surveillance venous duplex ultrasonography ordered.    Essential hypertension- (present on admission)  Assessment & Plan  Chronic condition treated with verapamil SR.  Resumed maintenance medication on admission.    Forehead laceration- (present on admission)  Assessment & Plan  Approximated with  absorbable sutures in trauma ICU.           DISPOSITION: Discharged home on 3/13/2024. The patient and family were counseled and questions were answered. Specifically, signs and symptoms of infection, respiratory decompensation, neurological deficits, and persistent or worsening pain were discussed and the patient agrees to seek medical attention if any of these develop.    DISCHARGE MEDICATIONS:  The patients controlled substance history was reviewed and a controlled substance use informed consent (if applicable) was provided by [facility] and the patient has been prescribed.     Medication List        START taking these medications        Instructions   acetaminophen 325 MG Tabs  Commonly known as: Tylenol   Take 2 Tablets by mouth every 6 hours as needed for Moderate Pain or Mild Pain.  Dose: 650 mg     levETIRAcetam 500 MG Tabs  Commonly known as: Keppra   Take 1 Tablet by mouth every 12 hours for 30 days.  Dose: 500 mg     prochlorperazine 5 MG Tabs  Commonly known as: Compazine   Take 1 Tablet by mouth every 6 hours as needed for Nausea/Vomiting.  Dose: 5 mg            CONTINUE taking these medications        Instructions   B-12 1000 MCG Tabs   Take 1,000 mcg by mouth every day.  Dose: 1,000 mcg     bimatoprost 0.03 % Soln  Commonly known as: Lumigan   Administer 1 Drop into both eyes every evening.  Dose: 1 Drop     cholestyramine 4 GM/DOSE powder  Commonly known as: Questran   Take 4 g by mouth every day for 90 days.  Dose: 4 g     Nitrostat 0.4 MG Subl  Generic drug: nitroglycerin   Place 0.4 mg under the tongue as needed for Chest Pain.  Dose: 0.4 mg     traZODone 50 MG Tabs  Commonly known as: Desyrel   Take 1 Tablet by mouth at bedtime.  Dose: 1 Tablet     verapamil  MG Cp24  Commonly known as: Calan SR   Take 120 mg by mouth 2 times a day.  Dose: 120 mg              ACTIVITY:  Avoid strenuous activity until cleared by outpatient providers     WOUND CARE:  Forehead laceration  approximated with absorbable sutures     DIET:  Orders Placed This Encounter   Procedures    Diet Order Diet: Regular     Standing Status:   Standing     Number of Occurrences:   1     Order Specific Question:   Diet:     Answer:   Regular [1]       FOLLOW UP:  Solomon Mcguire M.D.  [address]  [place] [address]  [phone]    Schedule an appointment as soon as possible for a visit in 1 month(s)  Follow up for possible interval drainage of subdural hematoma    Saige Patel M.D.  [id number]   [place] [id number] [phone]    Schedule an appointment as soon as possible for a visit        TIME SPENT ON DISCHARGE: 37 minutes    ____________________________________________  Pamela Perez P.A.-C.    DD: 3/13/2024 12:14 PM

## 2024-03-13 VITALS
HEIGHT: 62 IN | HEART RATE: 75 BPM | SYSTOLIC BLOOD PRESSURE: 141 MMHG | OXYGEN SATURATION: 94 % | WEIGHT: 105.16 LBS | RESPIRATION RATE: 17 BRPM | BODY MASS INDEX: 19.35 KG/M2 | DIASTOLIC BLOOD PRESSURE: 90 MMHG | TEMPERATURE: 98.7 F

## 2024-03-13 LAB
ALBUMIN SERPL BCP-MCNC: 3.4 G/DL (ref 3.2–4.9)
ALBUMIN/GLOB SERPL: 1.6 G/DL
ALP SERPL-CCNC: 61 U/L (ref 30–99)
ALT SERPL-CCNC: 23 U/L (ref 2–50)
ANION GAP SERPL CALC-SCNC: 10 MMOL/L (ref 7–16)
AST SERPL-CCNC: 26 U/L (ref 12–45)
BASOPHILS # BLD AUTO: 0.3 % (ref 0–1.8)
BASOPHILS # BLD: 0.03 K/UL (ref 0–0.12)
BILIRUB SERPL-MCNC: 0.5 MG/DL (ref 0.1–1.5)
BUN SERPL-MCNC: 5 MG/DL (ref 8–22)
CALCIUM ALBUM COR SERPL-MCNC: 8.7 MG/DL (ref 8.5–10.5)
CALCIUM SERPL-MCNC: 8.2 MG/DL (ref 8.5–10.5)
CHLORIDE SERPL-SCNC: 104 MMOL/L (ref 96–112)
CO2 SERPL-SCNC: 23 MMOL/L (ref 20–33)
CREAT SERPL-MCNC: 0.41 MG/DL (ref 0.5–1.4)
EOSINOPHIL # BLD AUTO: 0.03 K/UL (ref 0–0.51)
EOSINOPHIL NFR BLD: 0.3 % (ref 0–6.9)
ERYTHROCYTE [DISTWIDTH] IN BLOOD BY AUTOMATED COUNT: 38.5 FL (ref 35.9–50)
GFR SERPLBLD CREATININE-BSD FMLA CKD-EPI: 97 ML/MIN/1.73 M 2
GLOBULIN SER CALC-MCNC: 2.1 G/DL (ref 1.9–3.5)
GLUCOSE SERPL-MCNC: 99 MG/DL (ref 65–99)
HCT VFR BLD AUTO: 35.2 % (ref 37–47)
HGB BLD-MCNC: 12.4 G/DL (ref 12–16)
IMM GRANULOCYTES # BLD AUTO: 0.06 K/UL (ref 0–0.11)
IMM GRANULOCYTES NFR BLD AUTO: 0.7 % (ref 0–0.9)
LYMPHOCYTES # BLD AUTO: 0.71 K/UL (ref 1–4.8)
LYMPHOCYTES NFR BLD: 8.1 % (ref 22–41)
MCH RBC QN AUTO: 32.1 PG (ref 27–33)
MCHC RBC AUTO-ENTMCNC: 35.2 G/DL (ref 32.2–35.5)
MCV RBC AUTO: 91.2 FL (ref 81.4–97.8)
MONOCYTES # BLD AUTO: 0.83 K/UL (ref 0–0.85)
MONOCYTES NFR BLD AUTO: 9.5 % (ref 0–13.4)
NEUTROPHILS # BLD AUTO: 7.12 K/UL (ref 1.82–7.42)
NEUTROPHILS NFR BLD: 81.1 % (ref 44–72)
NRBC # BLD AUTO: 0 K/UL
NRBC BLD-RTO: 0 /100 WBC (ref 0–0.2)
PLATELET # BLD AUTO: 231 K/UL (ref 164–446)
PMV BLD AUTO: 10.3 FL (ref 9–12.9)
POTASSIUM SERPL-SCNC: 3.8 MMOL/L (ref 3.6–5.5)
PROT SERPL-MCNC: 5.5 G/DL (ref 6–8.2)
RBC # BLD AUTO: 3.86 M/UL (ref 4.2–5.4)
SODIUM SERPL-SCNC: 137 MMOL/L (ref 135–145)
WBC # BLD AUTO: 8.8 K/UL (ref 4.8–10.8)

## 2024-03-13 PROCEDURE — 92523 SPEECH SOUND LANG COMPREHEN: CPT

## 2024-03-13 PROCEDURE — 700102 HCHG RX REV CODE 250 W/ 637 OVERRIDE(OP): Performed by: PHYSICIAN ASSISTANT

## 2024-03-13 PROCEDURE — 97535 SELF CARE MNGMENT TRAINING: CPT

## 2024-03-13 PROCEDURE — 97162 PT EVAL MOD COMPLEX 30 MIN: CPT

## 2024-03-13 PROCEDURE — 99239 HOSP IP/OBS DSCHRG MGMT >30: CPT | Performed by: PHYSICIAN ASSISTANT

## 2024-03-13 PROCEDURE — 97165 OT EVAL LOW COMPLEX 30 MIN: CPT

## 2024-03-13 PROCEDURE — 85025 COMPLETE CBC W/AUTO DIFF WBC: CPT

## 2024-03-13 PROCEDURE — A9270 NON-COVERED ITEM OR SERVICE: HCPCS | Performed by: PHYSICIAN ASSISTANT

## 2024-03-13 PROCEDURE — A9270 NON-COVERED ITEM OR SERVICE: HCPCS | Performed by: NURSE PRACTITIONER

## 2024-03-13 PROCEDURE — 80053 COMPREHEN METABOLIC PANEL: CPT

## 2024-03-13 PROCEDURE — 700111 HCHG RX REV CODE 636 W/ 250 OVERRIDE (IP): Performed by: PHYSICIAN ASSISTANT

## 2024-03-13 PROCEDURE — 700102 HCHG RX REV CODE 250 W/ 637 OVERRIDE(OP): Performed by: NURSE PRACTITIONER

## 2024-03-13 RX ORDER — PROCHLORPERAZINE MALEATE 5 MG/1
5 TABLET ORAL EVERY 6 HOURS PRN
Qty: 15 TABLET | Refills: 0 | Status: SHIPPED | OUTPATIENT
Start: 2024-03-13

## 2024-03-13 RX ORDER — VERAPAMIL HYDROCHLORIDE 120 MG/1
120 CAPSULE, EXTENDED RELEASE ORAL 2 TIMES DAILY
Status: DISCONTINUED | OUTPATIENT
Start: 2024-03-13 | End: 2024-03-13 | Stop reason: HOSPADM

## 2024-03-13 RX ORDER — VERAPAMIL HYDROCHLORIDE 120 MG/1
120 CAPSULE, EXTENDED RELEASE ORAL
Status: DISCONTINUED | OUTPATIENT
Start: 2024-03-13 | End: 2024-03-13

## 2024-03-13 RX ORDER — LEVETIRACETAM 500 MG/1
500 TABLET ORAL EVERY 12 HOURS
Qty: 60 TABLET | Refills: 0 | Status: SHIPPED | OUTPATIENT
Start: 2024-03-13 | End: 2024-04-12

## 2024-03-13 RX ORDER — VERAPAMIL HYDROCHLORIDE 80 MG/1
40 TABLET ORAL DAILY
Status: DISCONTINUED | OUTPATIENT
Start: 2024-03-13 | End: 2024-03-13

## 2024-03-13 RX ORDER — ACETAMINOPHEN 325 MG/1
650 TABLET ORAL EVERY 6 HOURS PRN
COMMUNITY
Start: 2024-03-13

## 2024-03-13 RX ADMIN — LEVETIRACETAM 500 MG: 500 TABLET, FILM COATED ORAL at 04:57

## 2024-03-13 RX ADMIN — PROCHLORPERAZINE EDISYLATE 10 MG: 5 INJECTION INTRAMUSCULAR; INTRAVENOUS at 00:49

## 2024-03-13 RX ADMIN — VERAPAMIL HYDROCHLORIDE 120 MG: 120 CAPSULE, DELAYED RELEASE PELLETS ORAL at 10:08

## 2024-03-13 ASSESSMENT — COGNITIVE AND FUNCTIONAL STATUS - GENERAL
TURNING FROM BACK TO SIDE WHILE IN FLAT BAD: A LITTLE
DRESSING REGULAR LOWER BODY CLOTHING: A LITTLE
SUGGESTED CMS G CODE MODIFIER MOBILITY: CK
MOVING FROM LYING ON BACK TO SITTING ON SIDE OF FLAT BED: A LITTLE
MOVING TO AND FROM BED TO CHAIR: A LITTLE
SUGGESTED CMS G CODE MODIFIER DAILY ACTIVITY: CJ
STANDING UP FROM CHAIR USING ARMS: A LITTLE
HELP NEEDED FOR BATHING: A LITTLE
MOBILITY SCORE: 18
WALKING IN HOSPITAL ROOM: A LITTLE
TOILETING: A LITTLE
DAILY ACTIVITIY SCORE: 20
CLIMB 3 TO 5 STEPS WITH RAILING: A LITTLE
DRESSING REGULAR UPPER BODY CLOTHING: A LITTLE

## 2024-03-13 ASSESSMENT — GAIT ASSESSMENTS
DISTANCE (FEET): 175
GAIT LEVEL OF ASSIST: SUPERVISED
DEVIATION: DECREASED BASE OF SUPPORT

## 2024-03-13 ASSESSMENT — ACTIVITIES OF DAILY LIVING (ADL): TOILETING: INDEPENDENT

## 2024-03-13 ASSESSMENT — ENCOUNTER SYMPTOMS
TINGLING: 0
NECK PAIN: 0
SPEECH CHANGE: 0
CHILLS: 0
MYALGIAS: 1
SENSORY CHANGE: 0
VOMITING: 0
BACK PAIN: 0
FEVER: 0
ROS GI COMMENTS: BM 3/11
HEADACHES: 0
SHORTNESS OF BREATH: 0
ABDOMINAL PAIN: 0
DIZZINESS: 0
FOCAL WEAKNESS: 0
NAUSEA: 0

## 2024-03-13 ASSESSMENT — PAIN DESCRIPTION - PAIN TYPE: TYPE: ACUTE PAIN

## 2024-03-13 NOTE — THERAPY
Physical Therapy   Initial Evaluation     Patient Name: Indy Patel  Age:  83 y.o., Sex:  female  Medical Record #: [id number]  Today's Date: 3/13/2024     Precautions  Precautions: Fall Risk      Assessment  Patient is an 83 y.o. female with hx of HTN, GERD, and DVT/PE, now admitted s/p GLF with R SDH (non op), forehead laceration, and A fib with RVR. PT eval complete, pt currently presents at her functional baseline and completed all mobility at SPV level with no AD. Pt presents with no significant unilateral or generalized deficits at this time. Anticipate that pt will be able to safely DC home with support from her dtr as needed once medically cleared. Patient will not be actively followed for physical therapy services at this time, however may be seen if requested by physician for 1 more visit within 30 days to address any discharge or equipment needs.     Returned later to discuss safe activity progressions with pt and her dtr. Educated them on mobility at home and progressing to the community as well as potential outpatient PT for higher level deficits. All questions answered    Plan    Physical Therapy Initial Treatment Plan   Duration: Discharge Needs Only    DC Equipment Recommendations: None  Discharge Recommendations: Other - (potentially OP PT in the future for higher level balance deficits)         Vitals   O2 (LPM) 0   O2 Delivery Device None - Room Air   Prior Living Situation   Prior Services Home-Independent   Housing / Facility 1 Story House   Steps Into Home 2   Steps In Home 0   Equipment Owned None   Lives with - Patient's Self Care Capacity Alone and Able to Care For Self   Comments pt reports her dtr will be coming to stay with her at DC   Prior Level of Functional Mobility   Bed Mobility Independent   Transfer Status Independent   Ambulation Independent   Ambulation Distance community distances   Assistive Devices Used None   Stairs Independent   History of Falls   History of Falls  Yes   Date of Last Fall   (reason for admit)   Cognition    Cognition / Consciousness WDL   Level of Consciousness Alert   Comments pleasant and participatory   Active ROM Lower Body    Active ROM Lower Body  WDL   Strength Lower Body   Lower Body Strength  WDL   Sensation Lower Body   Lower Extremity Sensation   WDL   Coordination Lower Body    Coordination Lower Body  WDL   Balance Assessment   Sitting Balance (Static) Good   Sitting Balance (Dynamic) Good   Standing Balance (Static) Fair +   Standing Balance (Dynamic) Fair +   Weight Shift Sitting Good   Weight Shift Standing Good   Comments no AD   Bed Mobility    Comments NT, at SPV level with OT   Gait Analysis   Gait Level Of Assist Supervised   Assistive Device None   Distance (Feet) 175   # of Times Distance was Traveled 1   Deviation Decreased Base Of Support   # of Stairs Climbed 6   Level of Assist with Stairs Standby Assist   Weight Bearing Status no restrictions   Functional Mobility   Sit to Stand Supervised   Bed, Chair, Wheelchair Transfer Supervised   Mobility no AD   6 Clicks Assessment - How much HELP from from another person do you currently need... (If the patient hasn't done an activity recently, how much help from another person do you think he/she would need if he/she tried?)   Turning from your back to your side while in a flat bed without using bedrails? 3   Moving from lying on your back to sitting on the side of a flat bed without using bedrails? 3   Moving to and from a bed to a chair (including a wheelchair)? 3   Standing up from a chair using your arms (e.g., wheelchair, or bedside chair)? 3   Walking in hospital room? 3   Climbing 3-5 steps with a railing? 3   6 clicks Mobility Score 18   Activity Tolerance   Comments no overt pain, SOB, or fatigue   Education Group   Education Provided Role of Physical Therapist   Role of Physical Therapist Patient Response Patient;Acceptance;Explanation;Verbal Demonstration   Physical Therapy  Initial Treatment Plan    Duration Discharge Needs Only   Problem List    Problems None   Anticipated Discharge Equipment and Recommendations   DC Equipment Recommendations None   Discharge Recommendations Other -  (potentially OP PT in the future for higher level balance deficits)   Interdisciplinary Plan of Care Collaboration   IDT Collaboration with  Nursing;Family / Caregiver   Patient Position at End of Therapy Seated;Chair Alarm On;Call Light within Reach;Tray Table within Reach;Phone within Reach;Family / Friend in Room   Collaboration Comments RN updated   Session Information   Date / Session Number  3/13- 1x only (DC needs)

## 2024-03-13 NOTE — PROGRESS NOTES
Monitor Summary: SR 61-75, NJ 0.13, QRS 0.10, QT 0.41, with rare PACs/PVCs per strip from monitor room.

## 2024-03-13 NOTE — CARE PLAN
The patient is Stable - Low risk of patient condition declining or worsening    Shift Goals  Clinical Goals: stable neuro checks, stable bp  Patient Goals: rest  Family Goals: safety    Progress made toward(s) clinical / shift goals:        Problem: Knowledge Deficit - Standard  Goal: Patient and family/care givers will demonstrate understanding of plan of care, disease process/condition, diagnostic tests and medications  Outcome: Progressing     Problem: Fall Risk  Goal: Patient will remain free from falls  Outcome: Progressing     Problem: Pain - Standard  Goal: Alleviation of pain or a reduction in pain to the patient’s comfort goal  Outcome: Progressing     Problem: Skin Integrity  Goal: Skin integrity is maintained or improved  Outcome: Progressing       Patient is not progressing towards the following goals:

## 2024-03-13 NOTE — THERAPY
"Occupational Therapy   Initial Evaluation     Patient Name: Indy Patel  Age:  83 y.o., Sex:  female  Medical Record #: [id number]  Today's Date: 3/13/2024     Precautions  Precautions: (P) Fall Risk  Comments: (P) SBP<150    Assessment  Patient is an 83 y.o. female with a diagnosis of SDH 2/2 GLF, managed nonoperatively at this time. Pt also converted to a-fib with RVR and underwent synchronized cardioversion with 2 shocks. Additional factors influencing patient status / progress: PMHx includes GERD, hear murmur, migraine, osteopenia, pancreatitis, venous thrombosis and embolism, reactive hypoglycemia, appendectomy, cholecystectomy, hysterectomy with oophorectomy.     During OT eval, pt demo'd ADLs and related functional mobility with overall SPV. No notable strength, coordination, or functional cognition deficits. Pt is typically very active and plays pickleball. Pt is uncertain of the circumstances surrounding her fall but thinks she might have lost her balance when she turned. Reports her daughter will initially stay with her at d/c. Patient will not be actively followed for occupational therapy services at this time, however may be seen if requested by physician for 1 more visit within 30 days to address any discharge or equipment needs.      Plan    Occupational Therapy Initial Treatment Plan   Duration: (P) Discharge Needs Only    DC Equipment Recommendations: (P) Tub / Shower Seat, Hand Held Shower  Discharge Recommendations: (P) Anticipate that the patient will have no further occupational therapy needs after discharge from the hospital     Subjective    \"I think I just turned and must have lost my balance.\"     Objective     03/13/24 0824   Initial Contact Note    Initial Contact Note Order Received and Verified, Evaluation Only - Patient Does Not Require Further Acute Occupational Therapy at this Time.  However, May Benefit from Post Acute Therapy for Higher Level Functional Deficits.   Prior " Living Situation   Prior Services Home-Independent   Housing / Facility 1 Story House   Steps Into Home 2   Steps In Home 0   Bathroom Set up Walk In Shower;Built-In Shower Chair   Equipment Owned None   Lives with - Patient's Self Care Capacity Alone and Able to Care For Self   Comments Pt reports her daughter will be staying with her initially at d/c. 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)   Leisure Interests Pets;Exercise   History of Falls   History of Falls Yes   Date of Last Fall   (reason for admission)   Precautions   Precautions Fall Risk   Comments SBP<150   Pain   Intervention Declines   Cognition    Cognition / Consciousness WDL   Level of Consciousness Alert   Comments Pleasant and cooperative   Active ROM Upper Body   Active ROM Upper Body  WDL   Strength Upper Body   Upper Body Strength  WDL   Sensation Upper Body   Upper Extremity Sensation  Not Tested   Upper Body Muscle Tone   Upper Body Muscle Tone  WDL   Neurological Concerns   Neurological Concerns No   Coordination Upper Body   Coordination WDL   Balance Assessment   Sitting Balance (Static) Good   Sitting Balance (Dynamic) Good   Standing Balance (Static) Fair +   Standing Balance (Dynamic) Fair +   Weight Shift Sitting Good   Weight Shift Standing Good   Comments no AD   Bed Mobility    Supine to Sit Supervised   Scooting Supervised   Comments bed flat   ADL Assessment   Grooming  Supervision;Standing   Upper Body Dressing Supervision   Lower Body Dressing Supervision   Toileting Supervision   How much help from another person does the patient currently need...   Putting on and taking off regular lower body clothing? 3   Bathing (including washing, rinsing, and drying)? 3   Toileting, which includes using a toilet, bedpan, or urinal? 3   Putting on and taking off regular upper body clothing? 3   Taking care of personal grooming such as brushing teeth? 4   Eating meals? 4   6 Clicks Daily Activity Score 20   Functional Mobility   Sit to Stand Supervised   Bed, Chair, Wheelchair Transfer Supervised   Toilet Transfers Supervised   Transfer Method Stand Step   Mobility EOB->bathroom->sink->chair   Activity Tolerance   Sitting in Chair post   Sitting Edge of Bed 2 min   Standing 5 min   Education Group   Role of Occupational Therapist Patient Response Patient;Acceptance;Explanation;Verbal Demonstration   Occupational Therapy Initial Treatment Plan    Duration Discharge Needs Only   Problem List   Problem List Decreased Functional Mobility;Decreased Activity Tolerance   Anticipated Discharge Equipment and Recommendations   DC Equipment Recommendations Tub / Shower Seat;Hand Held Shower   Discharge Recommendations Anticipate that the patient will have no further occupational therapy needs after discharge from the hospital   Interdisciplinary Plan of Care Collaboration   IDT Collaboration with  Nursing   Patient Position at End of Therapy Seated;Chair Alarm On;Call Light within Reach;Tray Table within Reach;Phone within Reach   Collaboration Comments RN aware   Session Information   Date / Session Number  3/13 d/c needs

## 2024-03-13 NOTE — THERAPY
"Speech Language Pathology   Communication Evaluation     Patient Name: Indy Patel  AGE:  83 y.o., SEX:  female  Medical Record #: [id number]  Date of Service: 3/13/2024      History of Present Illness  82 y/o female admitted 3/10 following GLF with no LOC.     CMHx: Trauma, HTN, SDH, forehead laceration  PMHx: GERD, s/p cholecystectomy, TIA, SCC face, PAF    No hx SLP in [facility].     CT Head 3/11:  \"1.  10.5 mm right holohemispheric subdural hematoma, similar compared to prior study.  2.  3.5 mm right to left midline shift, overall stable since prior study.\"      General Information  Vitals  O2 Delivery Device: None - Room Air  Level of Consciousness: Awake, Alert     Orientation: Oriented x 4  Follows Directives: Yes      Prior Living Situation & Level of Function  Prior Services: Home-Independent  Housing / Facility: [address] [place] [address]  Steps Into Home: 2  Steps In Home: 0  Lives with - Patient's Self Care Capacity: Alone and Able to Care For Self  Comments: Pt reports her daughter will be staying with her initially at d/c. Is very active at baseline and plays pickleball     Communication: WNL  Swallowing: WNL       Oral Mechanism Evaluation  Dentition: Good  Facial Symmetry: Equal  Facial Sensation: Equal  Labial Observations: WFL  Motor Speech: WNL      Laryngeal Function Exam  Secretion Management: Adequate  Voice Quality: WFL      Subjective  Daughter present at bedside      Communication Domain(s)  Expressive Language: WFL  Receptive Language: WFL  Cognitive-Linguistic: Mild  Social/Pragmatic: WFL      Assessment  The patient was seen this date for a cognitive communication evaluation.    Portions of the COGNISTAT (Neurobehavioral Cognitive Status Assessment) were administered in conjunction with non-standardized assessments. Results are outlined below:        Orientation: Average   -Answers 2/2 questions accurately regarding person.  -Answers 2/2 questions accurately regarding place.  -Answers 5/5 questions " accurately regarding time.    Attention: Average   -Repeats 6 digits forward in 1/1 trials    Comprehension: Average   -Follows 3 step commands accurately in 1/1 trials    Repetition: Average   -Repeats 5-7 word sentences accurately     Naming: Average   -Names 4/4 objects accurately    Memory: Mild-Moderate Impairment   -Immediate recall: 2 trials to recall 4 unrelated words  -Delayed recall: after 5 minutes, patient recalled 2/4 unrelated words without cues, improved to 3/4 with category cue. Does not improve further despite forced choice cue provided for final target.    Calculations: Average   -Completes 1/1 multiplication calculations accurately  -Completes 1/1 division calculations accurately    Similarities: Average   -Identifies how abstract reason for similarities between nouns in 1/1 opportunities    Judgement: Average   -3/3 accuracy on verbal reasoning/judgement tasks      Medication Management  Medication Management: requires min cues to identify correct dosage and administration times, max cues to name strategies (e.g., set alarm, write it down) to ensure compliance with prescription administration dose/times      Clinical Impressions  Patient presents with mild-moderate deficits in delayed verbal recall. Other areas of cognitive communication were within functional limits on today's bedside assessment. Based on today's evaluation, patient would benefit from discharge to an environment that can provide intermittent assist with IADLs. Family present and reports understanding of recommendations. Outpatient SLP referral also recommended. Will follow while in house.      NOTE: It is not within the scope of practice of Speech-Language Pathologists to determine patient capacity. Please defer to the physician or psych to complete this assessment.       Recommendations  Supervision Needs Upons Discharge: Intermittent assistance with IADLs (see below)  IADLs: Medication management, Financial management,  Cooking         SLP Treatment Plan  Treatment Plan: Cognitive Treatment  SLP Frequency: 2x Per Week  Estimated Duration: Until Therapy Goals Met      Anticipated Discharge Needs  Discharge Recommendations: Recommend outpatient speech therapy services  Therapy Recommendations Upon DC: Cognitive-Linguistic Training      Patient / Family Goals  Patient / Family Goal #1: to return home  Short Term Goal # 1: Patient will demonstrate use of compensatory strategies to recall functional information with at least 90% accuracy with min-no cues      Kamla Avery, SLP

## 2024-03-13 NOTE — CARE PLAN
The patient is Stable - Low risk of patient condition declining or worsening    Shift Goals  Clinical Goals: Monitor neuro status, safety  Patient Goals: Rest  Family Goals: Updates from provider    Progress made toward(s) clinical / shift goals:  Patient is A&Ox4. Educated patient on POC. Patient on continuous IVF. Patient is continent and ambulatory with SBA. Patient uses call light appropriately for needs. Medicated patient per MAR for nausea. Bed is low and locked. Bed alarm on. Call light within reach. Hourly rounding continues.       Problem: Knowledge Deficit - Standard  Goal: Patient and family/care givers will demonstrate understanding of plan of care, disease process/condition, diagnostic tests and medications  Outcome: Progressing     Problem: Fall Risk  Goal: Patient will remain free from falls  Outcome: Progressing       Patient is not progressing towards the following goals:

## 2024-03-13 NOTE — PROGRESS NOTES
Date of Service  3/13/2024    Chief Complaint  83 y.o. female admitted 3/10/2024 with SDH and forehead laceration after GLF.     Interval Events  Up to chair at bedside.   Neuro examination stable.   Nausea resolved.   Denies pain.     - Tolerating clears, ADAT.   - Q4 neuro checks.   - PT/OT and cognitive evaluations pending.   - Medically cleared for discharge pending therapy evals.     Review of Systems  Review of Systems   Constitutional:  Positive for malaise/fatigue. Negative for chills and fever.   Respiratory:  Negative for shortness of breath.    Cardiovascular:  Negative for chest pain.   Gastrointestinal:  Negative for abdominal pain, nausea and vomiting.        BM 3/11   Musculoskeletal:  Positive for myalgias. Negative for back pain and neck pain.   Neurological:  Negative for dizziness, tingling, sensory change, speech change, focal weakness and headaches.        Vital Signs  Temp:  [35.9 °C (96.7 °F)-37.1 °C (98.7 °F)] 37.1 °C (98.7 °F)  Pulse:  [60-78] 75  Resp:  [17-43] 17  BP: (125-159)/(79-96) 150/91  SpO2:  [92 %-94 %] 94 %    Physical Exam  Physical Exam  Vitals and nursing note reviewed.   Constitutional:       General: She is not in acute distress.     Appearance: She is not ill-appearing.   HENT:      Head: Normocephalic.      Comments: Forehead laceration approximated with sutures with surrounding bruising      Mouth/Throat:      Mouth: Mucous membranes are moist.   Eyes:      Extraocular Movements: Extraocular movements intact.      Conjunctiva/sclera: Conjunctivae normal.      Pupils: Pupils are equal, round, and reactive to light.   Cardiovascular:      Rate and Rhythm: Normal rate.   Pulmonary:      Effort: Pulmonary effort is normal. No respiratory distress.   Abdominal:      General: There is no distension.      Palpations: Abdomen is soft.      Tenderness: There is no abdominal tenderness.   Musculoskeletal:         General: No swelling, tenderness or signs of injury.       Cervical back: Normal range of motion and neck supple. No tenderness.      Comments: Moves all extremities    Skin:     General: Skin is warm and dry.   Neurological:      Mental Status: She is alert and oriented to person, place, and time.      GCS: GCS eye subscore is 4. GCS verbal subscore is 5. GCS motor subscore is 6.         Laboratory  Recent Results (from the past 24 hour(s))   CBC with Differential: Tomorrow AM    Collection Time: 03/13/24  2:42 AM   Result Value Ref Range    WBC 8.8 4.8 - 10.8 K/uL    RBC 3.86 (L) 4.20 - 5.40 M/uL    Hemoglobin 12.4 12.0 - 16.0 g/dL    Hematocrit 35.2 (L) 37.0 - 47.0 %    MCV 91.2 81.4 - 97.8 fL    MCH 32.1 27.0 - 33.0 pg    MCHC 35.2 32.2 - 35.5 g/dL    RDW 38.5 35.9 - 50.0 fL    Platelet Count 231 164 - 446 K/uL    MPV 10.3 9.0 - 12.9 fL    Neutrophils-Polys 81.10 (H) 44.00 - 72.00 %    Lymphocytes 8.10 (L) 22.00 - 41.00 %    Monocytes 9.50 0.00 - 13.40 %    Eosinophils 0.30 0.00 - 6.90 %    Basophils 0.30 0.00 - 1.80 %    Immature Granulocytes 0.70 0.00 - 0.90 %    Nucleated RBC 0.00 0.00 - 0.20 /100 WBC    Neutrophils (Absolute) 7.12 1.82 - 7.42 K/uL    Lymphs (Absolute) 0.71 (L) 1.00 - 4.80 K/uL    Monos (Absolute) 0.83 0.00 - 0.85 K/uL    Eos (Absolute) 0.03 0.00 - 0.51 K/uL    Baso (Absolute) 0.03 0.00 - 0.12 K/uL    Immature Granulocytes (abs) 0.06 0.00 - 0.11 K/uL    NRBC (Absolute) 0.00 K/uL   Comp Metabolic Panel (CMP): Tomorrow AM    Collection Time: 03/13/24  2:42 AM   Result Value Ref Range    Sodium 137 135 - 145 mmol/L    Potassium 3.8 3.6 - 5.5 mmol/L    Chloride 104 96 - 112 mmol/L    Co2 23 20 - 33 mmol/L    Anion Gap 10.0 7.0 - 16.0    Glucose 99 65 - 99 mg/dL    Bun 5 (L) 8 - 22 mg/dL    Creatinine 0.41 (L) 0.50 - 1.40 mg/dL    Calcium 8.2 (L) 8.5 - 10.5 mg/dL    Correct Calcium 8.7 8.5 - 10.5 mg/dL    AST(SGOT) 26 12 - 45 U/L    ALT(SGPT) 23 2 - 50 U/L    Alkaline Phosphatase 61 30 - 99 U/L    Total Bilirubin 0.5 0.1 - 1.5 mg/dL    Albumin 3.4 3.2 -  4.9 g/dL    Total Protein 5.5 (L) 6.0 - 8.2 g/dL    Globulin 2.1 1.9 - 3.5 g/dL    A-G Ratio 1.6 g/dL   ESTIMATED GFR    Collection Time: 03/13/24  2:42 AM   Result Value Ref Range    GFR (CKD-EPI) 97 >60 mL/min/1.73 m 2       Fluids    Intake/Output Summary (Last 24 hours) at 3/13/2024 0914  Last data filed at 3/12/2024 1000  Gross per 24 hour   Intake 155.71 ml   Output --   Net 155.71 ml       Core Measures & Quality Metrics  Labs reviewed, Radiology images reviewed and Medications reviewed  Gaston catheter: No Gaston      DVT Prophylaxis: Contraindicated - High bleeding risk  DVT prophylaxis - mechanical: SCDs  Ulcer prophylaxis: Yes        RAP Score Total: 7    CAGE Results: not completed Blood Alcohol>0.08: no       Assessment/Plan  * Trauma- (present on admission)  Assessment & Plan  Ground level fall.  Trauma Yellow Transfer Activation from [facility] in [place], [place].  Faizan Vance MD. Trauma Surgery.    Subdural hematoma, post-traumatic (HCC)- (present on admission)  Assessment & Plan  13 mm acute right cerebral hemispheric subdural hematoma with mass effect and 3 mm leftward midline shift.  Interval head CT stable.   No emergent surgical intervention warranted. Plan for joni hole drainage of SDH tomorrow if neuro status declines versus at 2-3 week interval once blood liquefies.   Post traumatic pharmacologic seizure prophylaxis for 1 week.  Speech Language Pathology cognitive evaluation.  Solomon Mcguire MD. Neurosurgeon. [facility].    Atrial fibrillation with RVR (HCC)- (present on admission)  Assessment & Plan  3/11 Patient converted to a fib with RVR after being given Zofran and Hydralazine.   - Performed synchronized cardioversion with 2 shocks delivered.    - Amiodarone drip initiated.   3/12 Amiodarone drip discontinued. Home Verapamil restarted 3/11.   - Remote tele monitoring on the talley.     Aspirin long-term use- (present on admission)  Assessment & Plan  Takes  aspirin every other day.   TEG with platelet mapping completed. No reversal indicated    Contraindication to deep vein thrombosis (DVT) prophylaxis- (present on admission)  Assessment & Plan  VTE prophylaxis initially contraindicated secondary to elevated bleeding risk.  3/11 Trauma surveillance venous duplex ultrasonography ordered.    Essential hypertension- (present on admission)  Assessment & Plan  Chronic condition treated with verapamil SR.  Resumed maintenance medication on admission.    Forehead laceration- (present on admission)  Assessment & Plan  Approximated with absorbable sutures in trauma ICU.       Discussed patient condition with RN, Therapies, , Patient, and trauma surgery. Faizan Vance MD

## 2024-03-13 NOTE — PROGRESS NOTES
Assumed care of pt around 1630. Pt is alert and oriented. Ambulatory. Voided in the restroom. Bed alarm on.

## 2024-03-13 NOTE — PROGRESS NOTES
Neurosurgery Progress Note    Subjective:  No events over the last 24 hours    Exam:  A&O 3  EOM intact, PERRL, facial symmetry  ALONSO with FS  No drift    BP  Min: 125/89  Max: 159/96  Pulse  Av.6  Min: 60  Max: 78  Resp  Av.4  Min: 17  Max: 20  Temp  Av.6 °C (97.8 °F)  Min: 35.9 °C (96.7 °F)  Max: 37.1 °C (98.7 °F)  SpO2  Av.3 %  Min: 92 %  Max: 94 %    No data recorded    Recent Labs     24  0422 24  0517 24  0242   WBC 11.5* 7.5 8.8   RBC 3.95* 3.78* 3.86*   HEMOGLOBIN 12.5 12.4 12.4   HEMATOCRIT 36.7* 34.6* 35.2*   MCV 92.9 91.5 91.2   MCH 31.6 32.8 32.1   MCHC 34.1 35.8* 35.2   RDW 38.7 38.7 38.5   PLATELETCT 255 211 231   MPV 10.2 10.0 10.3     Recent Labs     24  04224  0517 24  0242   SODIUM 139 136 137   POTASSIUM 3.3* 3.7 3.8   CHLORIDE 107 105 104   CO2 19* 21 23   GLUCOSE 159* 112* 99   BUN 11 5* 5*   CREATININE 0.59 0.50 0.41*   CALCIUM 7.4* 8.2* 8.2*     Recent Labs     03/10/24  1934 03/10/24  2014   APTT 26.5 24.5*   INR 1.01 0.94     Recent Labs     03/10/24  2014   REACTMIN 3.5*   CLOTKINET 0.9   CLOTANGL 76.9   MAXCLOTS 64.4   [id number] 0.3   PRCINADP 12.1   PRCINAA 6.7       Intake/Output                         24 - 24 - 24 Total [id number] Total                 Intake    I.V.  926.6  -- 926.6  --  -- --    Magnesium Sulfate Volume 49.8 -- 49.8 -- -- --    Amiodarone Volume 151.1 -- 151.1 -- -- --    Volume (mL) (NS infusion) 443.3 -- 443.3 -- -- --    Volume (mL) (dextrose 5% infusion) 282.4 -- 282.4 -- -- --    IV Piggyback  44.1  -- 44.1  --  -- --    Volume (mL) (potassium chloride (Kcl) ivpb 10 mEq) 44.1 -- 44.1 -- -- --    Total Intake 970.7 -- 970.7 -- -- --       Output    Urine  --  -- --  --  -- --    Number of Times Voided 2 x 1 x 3 x -- -- --    Total Output -- -- -- -- -- --       Net I/O     970.7 -- 970.7 -- -- --            No intake or output  data in the 24 hours ending 03/13/24 1003            verapamil ER  120 mg BID    Nozin nasal  swab  1 Applicator BID    fentaNYL  25 mcg Q6HRS PRN    cholestyramine  4 g DAILY    dextrose 5%   Continuous    labetalol  10 mg Q6HRS PRN    prochlorperazine  10 mg Q6HRS PRN    traZODone  50 mg QHS PRN    Respiratory Therapy Consult   Continuous RT    Pharmacy Consult Request  1 Each PHARMACY TO DOSE    docusate sodium  100 mg BID    senna-docusate  1 Tablet Nightly    senna-docusate  1 Tablet Q24HRS PRN    polyethylene glycol/lytes  1 Packet BID    magnesium hydroxide  30 mL DAILY    bisacodyl  10 mg Q24HRS PRN    sodium phosphate  1 Each Once PRN    NS   Continuous    acetaminophen  650 mg Q4HRS PRN    levETIRAcetam  500 mg Q12HRS    Or    levETIRAcetam (Keppra) IV  500 mg Q12HRS       Assessment and Plan:  Hospital day #3 SDH  POD #na  Prophylactic anticoagulation: no         Start date/time: tbd please mobilize     Brain Compression: Yes Traumatic    Neuro exam stable  Patient is feeling much better this morning with no nausea  She has no deficits due to the fact that she is doing very well even though she has a large subdural and stable we would have her follow-up in a month with the CT scan to see if this is become subacute to chronic in terms of appearance which would allow us to drain it through bur holes if the patient becomes symptomatic in the next several weeks we will take her back is a elective patient if she is able to tolerate waiting longer it will make it more easy to take it out through bur holes and avoid a full craniotomy which would be required at this time due to the density in the acuity of the blood.  Given the lack of deficits we think it is safe for her to return home and we have discussed with her that if she has changes in her overall physical exam or her family becomes concerned to bring her back to the ER and we will t take out the subdural more rapidly.    At this time no  neurosurgical needs  She can be discharged home on Keppra 500 twice daily  Patient should be off of any systemic anticoagulation including NSAIDs aspirin until we remove the blood clot.    Total of 30 minutes in direct patient care coordination consultation

## 2024-03-14 ENCOUNTER — PATIENT OUTREACH (OUTPATIENT)
Dept: MEDICAL GROUP | Facility: LAB | Age: 84
End: 2024-03-14
Payer: MEDICARE

## 2024-03-14 NOTE — PROGRESS NOTES
Transitional Care Management    This patient qualifies for a TCM visit, as they are being seen within the required time for CMS of 2 business days from discharge, a phone call by an RN is not required.  You can therefore see them as a TCM visit and charge appropriately.       Subjective:     Indy Patel is a 83 y.o. female who presents for Hospital Follow-up.    HPI:   Recently hospitalized for fall and head injury. Not sure what happened. She lost her balnace and fell in the kitchen   Did have afib with RVR in the hospital, and found to have subdural hematoma. Converted with meds, in the past did a cardioversion for a fib.     Is using glucometer long term, not getting alerts, but before her fall was not wearing her sensor so doesn't know if low sugar is related.     Prior to fall she did have GERD and diverticulitis episode for 4 days. Chest pain, tightness.     Is seeing Dr Mcguire on March 29th for follow up. He's neurosurgeon.   On Keppra for prophylaxis.   Current medicines (including reconciliation performed today)  Current Outpatient Medications   Medication Sig Dispense Refill    traZODone (DESYREL) 50 MG Tab Take 1 Tablet by mouth at bedtime. 90 Tablet 1    acetaminophen (TYLENOL) 325 MG Tab Take 2 Tablets by mouth every 6 hours as needed for Moderate Pain or Mild Pain.      levETIRAcetam (KEPPRA) 500 MG Tab Take 1 Tablet by mouth every 12 hours for 30 days. 60 Tablet 0    prochlorperazine (COMPAZINE) 5 MG Tab Take 1 Tablet by mouth every 6 hours as needed for Nausea/Vomiting. 15 Tablet 0    nitroglycerin (NITROSTAT) 0.4 MG SL Tab Place 0.4 mg under the tongue as needed for Chest Pain.      verapamil ER (CALAN SR) 120 MG CAPSULE SR 24 HR Take 120 mg by mouth 2 times a day.      Cyanocobalamin (B-12) 1000 MCG Tab Take 1,000 mcg by mouth every day.      bimatoprost (LUMIGAN) 0.03 % Solution Administer 1 Drop into both eyes every evening.      cholestyramine (QUESTRAN) 4 GM/DOSE powder Take 4 g by  "mouth every day for 90 days. 348.6 g 0     No current facility-administered medications for this visit.       Allergies:   Metoclopramide, Amitriptyline, Boniva [ibandronate sodium], Carvedilol, Ciprofloxacin, Denosumab, Gabapentin, Hydralazine, Hydrocodone-acetaminophen, Ibandronic acid, Metformin, Metoclopramide hcl, Metoprolol, Ondansetron, Oxycodone-acetaminophen, Paroxetine hcl, Reglan [metoclopramide hcl], and Vicodin [hydrocodone-acetaminophen]    Social History     Tobacco Use    Smoking status: Never    Smokeless tobacco: Never   Substance Use Topics    Alcohol use: No     Comment: once a month    Drug use: No       ROS:  See above.  Some mild dizziness now and then.  Blood pressure has been a little bit on the low side.    Objective:     Vitals:    03/15/24 1426   BP: 112/60   BP Location: Right arm   Patient Position: Sitting   BP Cuff Size: Adult   Pulse: 75   Resp: 12   Temp: 36.7 °C (98 °F)   SpO2: 94%   Weight: 49 kg (108 lb)   Height: 1.575 m (5' 2\")     Body mass index is 19.75 kg/m².    Physical Exam:  General AAOx3, no acute distress,  Neuro: Gait is normal.  Chest regular rate and rhythm, clear to auscultation bilaterally, no edema noted.  Psych: Speech is clear, good insight, normal affect.    Assessment and Plan:   1. Hospital discharge follow-up    2. Paroxysmal atrial fibrillation (HCC)  - [facility] ZIO PATCH MONITOR; Future  - EC-ECHOCARDIOGRAM COMPLETE W/O CONT; Future  - REFERRAL TO CARDIOLOGY    3. Subdural hematoma (HCC)    4. Concussion with unknown loss of consciousness status, sequela (HCC)    Other orders  - traZODone (DESYREL) 50 MG Tab; Take 1 Tablet by mouth at bedtime.  Dispense: 90 Tablet; Refill: 1    Discussed trying to find the reason for her syncopal episode and fall.  Will go ahead and get her a Zio patch cardiac monitor as well as an echo and a referral to cardiology.  We did discuss continuing to monitor blood sugars appropriately.  She does have a monitor in place today.  She " actually did started to drop fairly low in clinic today.  She is given some chocolate and things did resolve easily.  Discussed that consequence of a subdural and follow-up with neurosurgery.  She also discussed consequences of concussion.  Discussed making sure she gets her brain rest and not being too active on screens with a lot of brain work.  - Chart and discharge summary were reviewed.   - Hospitalization and results reviewed with patient.   - Medications reviewed including instructions regarding high risk medications, dosing and side effects.  - Recommended Services: No services needed at this time  - Advance directive/POLST on file?  No     Follow-up:No follow-ups on file.    Face-to-face transitional care management services with MODERATE (today's visit is within 14 days post discharge & LACE+ score of 28-58) medical decision complexity were provided.     LACE+ Historical Score Over Time (0-28: Low, 29-58: Medium, 59+: High): 27

## 2024-03-14 NOTE — PROGRESS NOTES
3/14: Pt has an appointment scheduled with PCP within two business days of discharge on 3/15/24 at 1440 for TCM. Therefore, a call by the RN is not required and PCP may bill for TCM appt.

## 2024-03-15 ENCOUNTER — OFFICE VISIT (OUTPATIENT)
Dept: MEDICAL GROUP | Facility: LAB | Age: 84
End: 2024-03-15
Payer: MEDICARE

## 2024-03-15 VITALS
HEART RATE: 75 BPM | BODY MASS INDEX: 19.88 KG/M2 | DIASTOLIC BLOOD PRESSURE: 60 MMHG | WEIGHT: 108 LBS | TEMPERATURE: 98 F | HEIGHT: 62 IN | SYSTOLIC BLOOD PRESSURE: 112 MMHG | RESPIRATION RATE: 12 BRPM | OXYGEN SATURATION: 94 %

## 2024-03-15 DIAGNOSIS — I48.0 PAROXYSMAL ATRIAL FIBRILLATION (HCC): ICD-10-CM

## 2024-03-15 DIAGNOSIS — S06.0XAS CONCUSSION WITH UNKNOWN LOSS OF CONSCIOUSNESS STATUS, SEQUELA (HCC): ICD-10-CM

## 2024-03-15 DIAGNOSIS — S06.5XAA SUBDURAL HEMATOMA (HCC): ICD-10-CM

## 2024-03-15 DIAGNOSIS — Z09 HOSPITAL DISCHARGE FOLLOW-UP: ICD-10-CM

## 2024-03-15 RX ORDER — TRAZODONE HYDROCHLORIDE 50 MG/1
50 TABLET ORAL
Qty: 90 TABLET | Refills: 1 | Status: SHIPPED | OUTPATIENT
Start: 2024-03-15 | End: 2024-03-19 | Stop reason: SDUPTHER

## 2024-03-15 ASSESSMENT — FIBROSIS 4 INDEX: FIB4 SCORE: 1.971409005630525029

## 2024-03-19 ENCOUNTER — TELEPHONE (OUTPATIENT)
Dept: MEDICAL GROUP | Facility: LAB | Age: 84
End: 2024-03-19
Payer: MEDICARE

## 2024-03-19 ENCOUNTER — HOSPITAL ENCOUNTER (EMERGENCY)
Facility: MEDICAL CENTER | Age: 84
End: 2024-03-19
Attending: EMERGENCY MEDICINE
Payer: MEDICARE

## 2024-03-19 VITALS
SYSTOLIC BLOOD PRESSURE: 170 MMHG | DIASTOLIC BLOOD PRESSURE: 75 MMHG | OXYGEN SATURATION: 93 % | WEIGHT: 108 LBS | BODY MASS INDEX: 19.88 KG/M2 | HEART RATE: 60 BPM | RESPIRATION RATE: 16 BRPM | TEMPERATURE: 97 F | HEIGHT: 62 IN

## 2024-03-19 DIAGNOSIS — I95.9 HYPOTENSION, UNSPECIFIED HYPOTENSION TYPE: ICD-10-CM

## 2024-03-19 DIAGNOSIS — E86.0 DEHYDRATION: ICD-10-CM

## 2024-03-19 LAB
ALBUMIN SERPL BCP-MCNC: 3.5 G/DL (ref 3.2–4.9)
ALBUMIN/GLOB SERPL: 1.5 G/DL
ALP SERPL-CCNC: 63 U/L (ref 30–99)
ALT SERPL-CCNC: 35 U/L (ref 2–50)
ANION GAP SERPL CALC-SCNC: 12 MMOL/L (ref 7–16)
AST SERPL-CCNC: 29 U/L (ref 12–45)
BASOPHILS # BLD AUTO: 0.4 % (ref 0–1.8)
BASOPHILS # BLD: 0.03 K/UL (ref 0–0.12)
BILIRUB SERPL-MCNC: 0.4 MG/DL (ref 0.1–1.5)
BUN SERPL-MCNC: 12 MG/DL (ref 8–22)
CALCIUM ALBUM COR SERPL-MCNC: 8.9 MG/DL (ref 8.5–10.5)
CALCIUM SERPL-MCNC: 8.5 MG/DL (ref 8.5–10.5)
CHLORIDE SERPL-SCNC: 101 MMOL/L (ref 96–112)
CO2 SERPL-SCNC: 25 MMOL/L (ref 20–33)
CREAT SERPL-MCNC: 0.59 MG/DL (ref 0.5–1.4)
EKG IMPRESSION: NORMAL
EOSINOPHIL # BLD AUTO: 1.44 K/UL (ref 0–0.51)
EOSINOPHIL NFR BLD: 16.9 % (ref 0–6.9)
ERYTHROCYTE [DISTWIDTH] IN BLOOD BY AUTOMATED COUNT: 38.6 FL (ref 35.9–50)
GFR SERPLBLD CREATININE-BSD FMLA CKD-EPI: 89 ML/MIN/1.73 M 2
GLOBULIN SER CALC-MCNC: 2.3 G/DL (ref 1.9–3.5)
GLUCOSE SERPL-MCNC: 93 MG/DL (ref 65–99)
HCT VFR BLD AUTO: 36.4 % (ref 37–47)
HGB BLD-MCNC: 12.7 G/DL (ref 12–16)
IMM GRANULOCYTES # BLD AUTO: 0.02 K/UL (ref 0–0.11)
IMM GRANULOCYTES NFR BLD AUTO: 0.2 % (ref 0–0.9)
LYMPHOCYTES # BLD AUTO: 1.02 K/UL (ref 1–4.8)
LYMPHOCYTES NFR BLD: 12 % (ref 22–41)
MCH RBC QN AUTO: 32.2 PG (ref 27–33)
MCHC RBC AUTO-ENTMCNC: 34.9 G/DL (ref 32.2–35.5)
MCV RBC AUTO: 92.4 FL (ref 81.4–97.8)
MONOCYTES # BLD AUTO: 0.84 K/UL (ref 0–0.85)
MONOCYTES NFR BLD AUTO: 9.8 % (ref 0–13.4)
NEUTROPHILS # BLD AUTO: 5.18 K/UL (ref 1.82–7.42)
NEUTROPHILS NFR BLD: 60.7 % (ref 44–72)
NRBC # BLD AUTO: 0 K/UL
NRBC BLD-RTO: 0 /100 WBC (ref 0–0.2)
PLATELET # BLD AUTO: 283 K/UL (ref 164–446)
PMV BLD AUTO: 10.3 FL (ref 9–12.9)
POTASSIUM SERPL-SCNC: 4.3 MMOL/L (ref 3.6–5.5)
PROT SERPL-MCNC: 5.8 G/DL (ref 6–8.2)
RBC # BLD AUTO: 3.94 M/UL (ref 4.2–5.4)
SODIUM SERPL-SCNC: 138 MMOL/L (ref 135–145)
WBC # BLD AUTO: 8.5 K/UL (ref 4.8–10.8)

## 2024-03-19 PROCEDURE — 85025 COMPLETE CBC W/AUTO DIFF WBC: CPT

## 2024-03-19 PROCEDURE — 80053 COMPREHEN METABOLIC PANEL: CPT

## 2024-03-19 PROCEDURE — 36415 COLL VENOUS BLD VENIPUNCTURE: CPT

## 2024-03-19 PROCEDURE — 700105 HCHG RX REV CODE 258: Performed by: EMERGENCY MEDICINE

## 2024-03-19 PROCEDURE — 93005 ELECTROCARDIOGRAM TRACING: CPT | Performed by: EMERGENCY MEDICINE

## 2024-03-19 PROCEDURE — 99284 EMERGENCY DEPT VISIT MOD MDM: CPT

## 2024-03-19 RX ORDER — SODIUM CHLORIDE 9 MG/ML
1000 INJECTION, SOLUTION INTRAVENOUS ONCE
Status: COMPLETED | OUTPATIENT
Start: 2024-03-19 | End: 2024-03-19

## 2024-03-19 RX ORDER — TRAZODONE HYDROCHLORIDE 50 MG/1
50 TABLET ORAL
Qty: 90 TABLET | Refills: 1 | Status: SHIPPED | OUTPATIENT
Start: 2024-03-19

## 2024-03-19 RX ADMIN — SODIUM CHLORIDE 1000 ML: 9 INJECTION, SOLUTION INTRAVENOUS at 18:47

## 2024-03-19 ASSESSMENT — FIBROSIS 4 INDEX: FIB4 SCORE: 1.971409005630525029

## 2024-03-19 NOTE — ED TRIAGE NOTES
Indy Patel  83 y.o. female  Chief Complaint   Patient presents with    Blood Pressure Problem     Pt states she has been having labile blood pressures. Today her bp dropped as low as 70's systolic. Pt states at this time she felt palpitations, but did not feel weakness or light headed.        Vitals:    03/19/24 1615   BP: 118/75   Pulse: 63   Resp: 16   Temp: 36.1 °C (96.9 °F)   SpO2: 98%       Patient educated on triage process and encouraged to alert staff of any changes in condition.    Pt takes medication for htn. Pt was told to come to the ER for evaluation of her bp as she has had some recent falls and her MD is concerned her changing BP may have something to do with it.

## 2024-03-19 NOTE — TELEPHONE ENCOUNTER
Received request via: Pharmacy    Was the patient seen in the last year in this department? Yes    Does the patient have an active prescription (recently filled or refills available) for medication(s) requested? No    Pharmacy Name: [facility]    Does the patient have retirement Plus and need 100 day supply (blood pressure, diabetes and cholesterol meds only)? Patient does not have SCP

## 2024-03-19 NOTE — TELEPHONE ENCOUNTER
Caller Name: Pt's daughter  Call Back Number:   Skylar Gregorio (Daughter)  [phone] (Mobile)     How would the patient prefer to be contacted with a response: Phone call OK to leave a detailed message    Pt's daughter called Pt has brain bleed. Has gone home from hospital and felt fine. All of the sudden pt's BP dropped to a dangerous level. Pt's daughter has gotten the BP back up.

## 2024-03-20 NOTE — DISCHARGE INSTRUCTIONS
Stay well-hydrated.  You may resume your home blood pressure medicine however if your blood pressure is low less than 110 systolic(top number) do not take your morning dose of your blood pressure medication.

## 2024-03-20 NOTE — ED NOTES
Pt provided discharge instructions. Pt verbalized understanding of all instructions. IV removed, cathlon intact, site without s/s of infection. Pt to lobby w/ via wheelchair.

## 2024-03-20 NOTE — ED PROVIDER NOTES
"ED Provider Note    Scribed for Kiki Kamara M.D. by Kwan Villatoro. 3/19/2024, 5:58 PM.    Primary care provider: Saige Patel M.D.  Means of arrival: Private vehicle.  History obtained from: Patient, daughter, and son-in-law  History limited by: None    CHIEF COMPLAINT  Chief Complaint   Patient presents with    Blood Pressure Problem     Pt states she has been having labile blood pressures. Today her bp dropped as low as 70's systolic. Pt states at this time she felt palpitations, but did not feel weakness or light headed.        HPI/ROS  Indy Patel is a 83 y.o. female who presents to the Emergency Department for evaluation of low blood pressure onset today. The patient reports she woke up  this morning and was feeling well. Then she was at the door talking to her neighbor when suddenly felt like her heart was \"pounding hard but not fast\". She then took her blood pressure which was 80/60 and stayed low with the lowest reading being 70/50. She called her PCP who told her to present to the ED for evaluation. The patient notes she did not drink any fluids this morning, but after her blood pressure dropped she drank a Gatorade and ate a snack, which caused her pressure to go back up on recheck at home. The patient notes she was admitted recently following a fall and has associated facial bruising from this. She reports her headache from the fall has resolved. She denies any other falls. The patient notes she takes verapamil for her blood pressure.    EXTERNAL RECORDS REVIEWED  Patient recently hospitalized on 3/10/24 for fall resulting in a traumatic subdural.  She did not require surgical intervention.    LIMITATION TO HISTORY   Select: : None    OUTSIDE HISTORIAN(S):  Family daughter and son-in-law were present and provided helpful and collateral history.      PAST MEDICAL HISTORY   has a past medical history of CATARACT, Colonic polyps, GERD (gastroesophageal reflux disease), Glucose " intolerance, Heart burn, Heart murmur, History of fundoplication (2001), Indigestion, IPMN (intraductal papillary mucinous neoplasm) (2007), IPMN (intraductal papillary mucinous neoplasm) (8/18/2015), Migraine, Osteopenia, Other specified disorder of intestines, Pancreatitis chronic, Personal history of venous thrombosis and embolism (2001, 1991), Reactive hypoglycemia, S/P appendectomy (1991), S/P cholecystectomy (2004), S/P hysterectomy with oophorectomy (1990), and Superficial phlebitis of leg.    SURGICAL HISTORY   has a past surgical history that includes gastroscopy with biopsy (4/25/08); egd w/endoscopic ultrasound (4/25/08); other orthopedic surgery; other abdominal surgery; gyn surgery; appendectomy (1991); egd w/endoscopic ultrasound (10/21/2009); gastroscopy with biopsy (10/21/2009); gastroscopy with biopsy (11/18/2010); egd w/endoscopic ultrasound (11/18/2010); cataract extraction with iol (11/2010); nissen fundoplication laparoscopic (2002); nissen fundoplication laparoscopic (2003); ercp w/sphincterotomy/papill. (6/7/2011); gastroscopy with biopsy (1/23/2012); and egd w/endoscopic ultrasound (1/23/2012).    SOCIAL HISTORY  Social History     Tobacco Use    Smoking status: Never    Smokeless tobacco: Never   Substance Use Topics    Alcohol use: No     Comment: once a month    Drug use: No      Social History     Substance and Sexual Activity   Drug Use No       FAMILY HISTORY  Family History   Problem Relation Age of Onset    Glasses Mother     Cancer Father         lung, smoker    Glasses Father     Heart Disease Father     Diabetes Paternal Aunt     Cancer Paternal Grandmother         breast cancer    Diabetes Paternal Grandfather     Diabetes Other     Heart Disease Other     Hypertension Other     Cancer Other        CURRENT MEDICATIONS  Current Outpatient Medications   Medication Instructions    acetaminophen (TYLENOL) 650 mg, Oral, EVERY 6 HOURS PRN    B-12 1,000 mcg, Oral, DAILY    bimatoprost  (LUMIGAN) 0.03 % Solution 1 Drop, Both Eyes, EVERY EVENING    cholestyramine (QUESTRAN) 4 g, Oral, DAILY    levETIRAcetam (KEPPRA) 500 mg, Oral, EVERY 12 HOURS    nitroglycerin (NITROSTAT) 0.4 mg, Sublingual, PRN    prochlorperazine (COMPAZINE) 5 mg, Oral, EVERY 6 HOURS PRN    traZODone (DESYREL) 50 mg, Oral, EVERY BEDTIME    verapamil ER (CALAN SR) 120 mg, Oral, 2 TIMES DAILY          ALLERGIES  Allergies   Allergen Reactions    Metoclopramide Shortness of Breath     Other reaction(s): Other (See Comments), Other (See Comments), Other (See Comments), Other (Specify with Comments), Unspecified  Memory loss  Reglan  Memory loss  Memory loss  Reglan  Reglan  Reglan    Amitriptyline Unspecified     Dry mouth tremor  Imipramine     Dry mouth tremor  Imipramine     Dry mouth tremor  Imipramine       Other reaction(s): Other (See Comments), Other (Specify with Comments), Unspecified  Dry mouth tremor  Imipramine     Dry mouth tremor  Imipramine     Dry mouth tremor  Imipramine     Dry mouth tremor  Imipramine     Dry mouth tremor  Imipramine     Dry mouth tremor  Imipramine       Boniva [Ibandronate Sodium] Unspecified     Severe pain    Carvedilol Vomiting    Ciprofloxacin Hives and Unspecified     Other reaction(s): Other (See Comments)  Not sure why it is here    Denosumab      Severe pain  Other reaction(s): Other (See Comments), Other (Specify with Comments)  Severe prolonged myalgias  Severe pain throughout body.  Severe prolonged myalgias  Severe pain    Gabapentin Unspecified     ??? Side effect  ??? Side effect    Other reaction(s): Other (See Comments), Unspecified  ??? Side effect  ??? Side effect  ??? Side effect  ??? Side effect    Hydralazine Palpitations     Afib rvr    Hydrocodone-Acetaminophen Vomiting and Nausea    Ibandronic Acid      Other reaction(s): Unspecified  Severe pain  Other reaction(s): Other (See Comments), Other (Specify with Comments)  Myalgia  Myalgia  Myalgia      Metformin Unspecified  "    Blood glucose dropped lower  Blood glucose dropped lower  Blood glucose dropped lower    Other reaction(s): Other (See Comments), Other (See Comments), Other (Specify with Comments), Unspecified  Blood glucose dropped lower  Blood glucose dropped lower  Blood glucose dropped lower  Blood glucose dropped lower  Blood glucose dropped lowerBlood glucose dropped lower    Metoclopramide Hcl Unspecified     Reglan  Reglan      Metoprolol Vomiting    Ondansetron Unspecified     Possible atrial fibrillation  Other reaction(s): Arrhythmia  Possible atrial fibrillation  Other reaction(s): Unspecified  Possible atrial fibrillation      Oxycodone-Acetaminophen Unspecified and Vomiting     Other reaction(s): Dizziness  Other reaction(s): Other (See Comments), Unspecified    Paroxetine Hcl Unspecified     Nervous insomnia  Nervous insomnia    Other reaction(s): Other (See Comments), Unspecified  Nervous insomnia  Nervous insomnia  Nervous insomnia  Nervous insomnia    Reglan [Metoclopramide Hcl] Shortness of Breath    Vicodin [Hydrocodone-Acetaminophen] Vomiting and Nausea       PHYSICAL EXAM  VITAL SIGNS: /75   Pulse 63   Temp 36.1 °C (96.9 °F) (Temporal)   Resp 16   Ht 1.575 m (5' 2\")   Wt 49 kg (108 lb)   SpO2 98%   BMI 19.75 kg/m²   Vitals reviewed by myself.  Physical Exam  Nursing note and vitals reviewed.  Constitutional: Well-developed and well-nourished. No acute distress.   HENT: Head is normocephalic and atraumatic.  Eyes: extra-ocular movements intact  Cardiovascular: Regular rate and regular rhythm. No murmur heard.  Pulmonary/Chest: Breath sounds normal. No wheezes or rales.   Abdominal: Soft and non-tender. No distention.    Musculoskeletal: Extremities exhibit normal range of motion without edema or tenderness.   Neurological: Awake and alert  Skin: Skin is warm and dry. No rash.          DIAGNOSTIC STUDIES:  LABS  Labs Reviewed   CBC WITH DIFFERENTIAL - Abnormal; Notable for the following " components:       Result Value    RBC 3.94 (*)     Hematocrit 36.4 (*)     Lymphocytes 12.00 (*)     Eosinophils 16.90 (*)     Eos (Absolute) 1.44 (*)     All other components within normal limits   COMP METABOLIC PANEL - Abnormal; Notable for the following components:    Total Protein 5.8 (*)     All other components within normal limits   ESTIMATED GFR       All labs reviewed and independently interpreted by myself    EKG Interpretation:    12 Lead EKG independently interpreted by myself to show:  EKG at 6:12 PM: Normal sinus rhythm, heart rate 57, normal axis, normal intervals, , QRS 91, QTc 455, no acute ST-T segment changes, no evidence of acute arrhythmia or ischemia per my independent interpretation      COURSE & MEDICAL DECISION MAKING    ED Observation Status? No; Patient does not meet criteria for ED Observation.     INITIAL ASSESSMENT, ED COURSE AND PLAN    Patient is an 83-year-old female who presented for evaluation of tension.  Differential diagnosis includes dehydration, orthostatic hypotension, vasovagal episode, arrhythmia.  Diagnostic workup includes labs and EKG.    Patient's initial vitals are within normal limits.  Based on her history I feel this was most likely an episode of orthostatic hypotension.  She is treated with a dose of IV fluids here.  Labs returned are unremarkable.  EKG demonstrates no acute findings.  Upon reassessment blood pressure is increasing to 170/75.  I advised her safe to resume her verapamil but she should ensure that she stays well-hydrated while taking blood pressure medications.  Patient is amenable to this plan.  She is then given strict return precautions and discharged in stable condition.       REASSESSMENTS        HYDRATION: Based on the patient's presentation of Dehydration the patient was given IV fluids. IV Hydration was used because oral hydration was not adequate alone. Upon recheck following hydration, the patient was improved.          DISPOSITION  AND DISCUSSIONS  I have discussed management of the patient with the following physicians and KENDRA's:  none    Discussion of management with other Q or appropriate source(s): none    Escalation of care considered, and ultimately not performed:see above    Barriers to care at this time, including but not limited to: none.     Decision tools and prescription drugs considered including, but not limited to: see above.        FINAL IMPRESSION  1. Hypotension, unspecified hypotension type    2. Dehydration          Kwan GAVIN (Scribe), am scribing for, and in the presence of, Kiki Kamara M.D..    Electronically signed by: Kwan Villatoro (Scribe), 3/19/2024    IKiki M.D. personally performed the services described in this documentation, as scribed by Kwan Villatoro in my presence, and it is both accurate and complete.    The note accurately reflects work and decisions made by me.  Kiki Kamara M.D.  3/19/2024  8:04 PM

## 2024-03-20 NOTE — ED NOTES
Bedside report from JALIL Urbina. Pt on gurney in lowest, locked position, on RA, and continuous pulse ox cardiac monitoring. Fall precautions in place, including fall risk sign. Pt updated on plan of care. No needs at this time. Call light within reach.

## 2024-03-25 ENCOUNTER — APPOINTMENT (OUTPATIENT)
Dept: RADIOLOGY | Facility: MEDICAL CENTER | Age: 84
End: 2024-03-25
Attending: EMERGENCY MEDICINE
Payer: MEDICARE

## 2024-03-25 ENCOUNTER — HOSPITAL ENCOUNTER (EMERGENCY)
Facility: MEDICAL CENTER | Age: 84
End: 2024-03-25
Attending: EMERGENCY MEDICINE
Payer: MEDICARE

## 2024-03-25 ENCOUNTER — TELEPHONE (OUTPATIENT)
Dept: HEALTH INFORMATION MANAGEMENT | Facility: OTHER | Age: 84
End: 2024-03-25
Payer: MEDICARE

## 2024-03-25 ENCOUNTER — TELEPHONE (OUTPATIENT)
Dept: MEDICAL GROUP | Facility: LAB | Age: 84
End: 2024-03-25

## 2024-03-25 VITALS
HEIGHT: 63 IN | OXYGEN SATURATION: 94 % | TEMPERATURE: 97.6 F | BODY MASS INDEX: 18.59 KG/M2 | RESPIRATION RATE: 18 BRPM | SYSTOLIC BLOOD PRESSURE: 155 MMHG | WEIGHT: 104.94 LBS | DIASTOLIC BLOOD PRESSURE: 75 MMHG | HEART RATE: 67 BPM

## 2024-03-25 DIAGNOSIS — R51.9 ACUTE NONINTRACTABLE HEADACHE, UNSPECIFIED HEADACHE TYPE: ICD-10-CM

## 2024-03-25 DIAGNOSIS — S06.5XAA SUBDURAL HEMATOMA (HCC): ICD-10-CM

## 2024-03-25 PROCEDURE — 99284 EMERGENCY DEPT VISIT MOD MDM: CPT

## 2024-03-25 PROCEDURE — 70450 CT HEAD/BRAIN W/O DYE: CPT

## 2024-03-25 ASSESSMENT — PAIN DESCRIPTION - PAIN TYPE: TYPE: ACUTE PAIN

## 2024-03-25 ASSESSMENT — FIBROSIS 4 INDEX: FIB4 SCORE: 1.45

## 2024-03-25 NOTE — TELEPHONE ENCOUNTER
Called daughter per MA request. Patient Indy had recent subdural hematoma and was discharged form [facility] on 3/19. Today patient presents with confounding symptoms of extreme fatigue (has been sleeping most of the day), pain R side of head,nausea, congestion and ear pain. She tested positive for Covid as well. Per neurology office patient was to have repeat head CT to reassess for bleeding today. Daughter feels patient is too weak to get her into the car to go to CT. Advised daughter that due to recent bleed coupled with Covid the patients symptoms need to be assessed at the emergency department to determine subdural status vs Covid infection symptoms. Daughter agrees to call 911 to have [facility] evaluate and transport if necessary to ED for definitive evaluation.

## 2024-03-25 NOTE — TELEPHONE ENCOUNTER
Daughter called and LVM reporting that mother health is declining, ongoing symptoms of subdermal hematoma, Tested positive for Covid, Facial congestion, not feeling well.     Please advise if okay to prescribe or best advice for patient thank you.

## 2024-03-26 NOTE — ED PROVIDER NOTES
ED Provider Note    CHIEF COMPLAINT  Chief Complaint   Patient presents with    Headache    Other    Nausea       EXTERNAL RECORDS REVIEWED  Discharge summary 3/12/2024, traumatic subdural hematoma, nonoperative management    HPI/ROS    Indy Patel is a 83 y.o. female who presents for evaluation of a headache, recent traumatic subdural, managed nonoperatively and discharged in the hospital almost 2 weeks ago.  She was scheduled for a follow-up CT scan today, ended up being diagnosed with COVID and her CT scan was canceled so she presents here.  She does have intermittent right-sided headaches, states it is somewhat worse than when she was in the hospital.  No vision changes.  No vomiting.  No focal weakness numbness or tingling.  No anticoagulation.  She has no other specific complaints    PAST MEDICAL HISTORY   has a past medical history of CATARACT, Colonic polyps, GERD (gastroesophageal reflux disease), Glucose intolerance, Heart burn, Heart murmur, History of fundoplication (2001), Indigestion, IPMN (intraductal papillary mucinous neoplasm) (2007), IPMN (intraductal papillary mucinous neoplasm) (8/18/2015), Migraine, Osteopenia, Other specified disorder of intestines, Pancreatitis chronic, Personal history of venous thrombosis and embolism (2001, 1991), Reactive hypoglycemia, S/P appendectomy (1991), S/P cholecystectomy (2004), S/P hysterectomy with oophorectomy (1990), and Superficial phlebitis of leg.    SURGICAL HISTORY   has a past surgical history that includes gastroscopy with biopsy (4/25/08); egd w/endoscopic ultrasound (4/25/08); other orthopedic surgery; other abdominal surgery; gyn surgery; appendectomy (1991); egd w/endoscopic ultrasound (10/21/2009); gastroscopy with biopsy (10/21/2009); gastroscopy with biopsy (11/18/2010); egd w/endoscopic ultrasound (11/18/2010); cataract extraction with iol (11/2010); nissen fundoplication laparoscopic (2002); nissen fundoplication laparoscopic (2003);  ercp w/sphincterotomy/papill. (6/7/2011); gastroscopy with biopsy (1/23/2012); and egd w/endoscopic ultrasound (1/23/2012).    FAMILY HISTORY  Family History   Problem Relation Age of Onset    Glasses Mother     Cancer Father         lung, smoker    Glasses Father     Heart Disease Father     Diabetes Paternal Aunt     Cancer Paternal Grandmother         breast cancer    Diabetes Paternal Grandfather     Diabetes Other     Heart Disease Other     Hypertension Other     Cancer Other        SOCIAL HISTORY  Social History     Tobacco Use    Smoking status: Never    Smokeless tobacco: Never   Vaping Use    Vaping Use: Not on file   Substance and Sexual Activity    Alcohol use: No     Comment: once a month    Drug use: No    Sexual activity: Not on file       CURRENT MEDICATIONS  Home Medications       Reviewed by Jodi Devlin R.N. (Registered Nurse) on 03/25/24 at 1716  Med List Status: Partial     Medication Last Dose Status   acetaminophen (TYLENOL) 325 MG Tab 3/25/2024 Active   bimatoprost (LUMIGAN) 0.03 % Solution  Active   cholestyramine (QUESTRAN) 4 GM/DOSE powder  Active   Cyanocobalamin (B-12) 1000 MCG Tab  Active   levETIRAcetam (KEPPRA) 500 MG Tab  Active   nitroglycerin (NITROSTAT) 0.4 MG SL Tab  Active   prochlorperazine (COMPAZINE) 5 MG Tab  Active   traZODone (DESYREL) 50 MG Tab  Active   verapamil ER (CALAN SR) 120 MG CAPSULE SR 24 HR 3/25/2024 Active                    ALLERGIES  Allergies   Allergen Reactions    Metoclopramide Shortness of Breath     Other reaction(s): Other (See Comments), Other (See Comments), Other (See Comments), Other (Specify with Comments), Unspecified  Memory loss  Reglan  Memory loss  Memory loss  Reglan  Reglan  Reglan    Amitriptyline Unspecified     Dry mouth tremor  Imipramine     Dry mouth tremor  Imipramine     Dry mouth tremor  Imipramine       Other reaction(s): Other (See Comments), Other (Specify with Comments), Unspecified  Dry mouth tremor  Imipramine     Dry  mouth tremor  Imipramine     Dry mouth tremor  Imipramine     Dry mouth tremor  Imipramine     Dry mouth tremor  Imipramine     Dry mouth tremor  Imipramine       Boniva [Ibandronate Sodium] Unspecified     Severe pain    Carvedilol Vomiting    Ciprofloxacin Hives and Unspecified     Other reaction(s): Other (See Comments)  Not sure why it is here    Denosumab      Severe pain  Other reaction(s): Other (See Comments), Other (Specify with Comments)  Severe prolonged myalgias  Severe pain throughout body.  Severe prolonged myalgias  Severe pain    Gabapentin Unspecified     ??? Side effect  ??? Side effect    Other reaction(s): Other (See Comments), Unspecified  ??? Side effect  ??? Side effect  ??? Side effect  ??? Side effect    Hydralazine Palpitations     Afib rvr    Hydrocodone-Acetaminophen Vomiting and Nausea    Ibandronic Acid      Other reaction(s): Unspecified  Severe pain  Other reaction(s): Other (See Comments), Other (Specify with Comments)  Myalgia  Myalgia  Myalgia      Metformin Unspecified     Blood glucose dropped lower  Blood glucose dropped lower  Blood glucose dropped lower    Other reaction(s): Other (See Comments), Other (See Comments), Other (Specify with Comments), Unspecified  Blood glucose dropped lower  Blood glucose dropped lower  Blood glucose dropped lower  Blood glucose dropped lower  Blood glucose dropped lowerBlood glucose dropped lower    Metoclopramide Hcl Unspecified     Reglan  Reglan      Metoprolol Vomiting    Ondansetron Unspecified     Possible atrial fibrillation  Other reaction(s): Arrhythmia  Possible atrial fibrillation  Other reaction(s): Unspecified  Possible atrial fibrillation      Oxycodone-Acetaminophen Unspecified and Vomiting     Other reaction(s): Dizziness  Other reaction(s): Other (See Comments), Unspecified    Paroxetine Hcl Unspecified     Nervous insomnia  Nervous insomnia    Other reaction(s): Other (See Comments), Unspecified  Nervous insomnia  Nervous  "insomnia  Nervous insomnia  Nervous insomnia    Reglan [Metoclopramide Hcl] Shortness of Breath    Vicodin [Hydrocodone-Acetaminophen] Vomiting and Nausea       PHYSICAL EXAM  VITAL SIGNS: /75   Pulse 65   Temp 37 °C (98.6 °F) (Temporal)   Resp 14   Ht 1.588 m (5' 2.5\")   Wt 47.6 kg (104 lb 15 oz)   SpO2 95%   BMI 18.89 kg/m²    Constitutional: Well appearing patient in no acute distress.  Awake and alert, not toxic nor ill in appearance.  HENT: Scab lesion to the left forehead, some sutures in place, no acute trauma.  Eyes: No scleral icterus. Normal conjunctiva   Thorax & Lungs: Normal nonlabored respirations. I appreciate no wheezing, rhonchi or rales. There is normal air movement.  Upon cardiac ascultation I appreciate a regular heart rhythm and a normal rate.   Neurologic: Alert & oriented. No focal deficits observed.      DIAGNOSTIC STUDIES / PROCEDURES    RADIOLOGY  I have independently interpreted the diagnostic imaging associated with this visit and am waiting the final reading from the radiologist.   My preliminary interpretation is as follows: No acute hemorrhage, stable measurements  Radiologist interpretation:   CT-HEAD W/O   Final Result      1. Stable right holohemispheric subdural hematoma with stable shift of midline structures from the right to the left of 3.9 mm, previously 3.5 mm. No hydrocephalus or effacement of basilar cisterns.   2. No acute hemorrhage in the interval.               COURSE & MEDICAL DECISION MAKING    ED Observation Status? No; Patient does not meet criteria for ED Observation.     INITIAL ASSESSMENT, COURSE AND PLAN  Care Narrative: 83-year-old female presents here for a CT scan, recent subdural, her scheduled follow-up CT scan today got canceled because she was diagnosed with COVID.  She states that she has had intermittent headaches that are somewhat worse than when she was in the hospital, no other acute complaints.  Appears well.  CT scan here is performed, " there is no significant expansion of the known subdural hematoma.  No other findings.  She is discharged home in stable condition with strict return instructions and follow-up as scheduled with neurosurgery      FINAL DIAGNOSIS  1. Subdural hematoma (HCC)    2. Acute nonintractable headache, unspecified headache type           Electronically signed by: Zeb Sanon M.D., 3/25/2024 5:45 PM

## 2024-03-26 NOTE — ED NOTES
Pt in bed, connected to pulse ox, & bp. . Rn introduced self to pt and oriented pt  to room and call light.

## 2024-03-26 NOTE — ED PROVIDER NOTES
Patient given discharge instructions and verbalizes understanding. Left with all belongings and escorted to ED lobby in wheelchair by RN. Daughter to come  patient, tech in lobby aware of pt.

## 2024-03-26 NOTE — ED TRIAGE NOTES
To triage via EMS  Chief Complaint   Patient presents with    Headache    Other    Nausea   Experienced a GLF 2 weeks ago and diagnosed with a R SDH with R to L midline shift. Pt has experienced worsening nausea and HA pain rated 5/10 since yesterday. Plan was to have a repeat head CT today, however she took a COVID test for nasal congestion as suggested by daughter and found to be COVID +. Imaging department canceled head CT due to positive COVID test.

## 2024-03-26 NOTE — ED NOTES
Bedside report received from off going RN Raven, assumed care of patient.  POC discussed with patient. Call light within reach, all needs addressed at this time.       Fall risk interventions in place:  (all applicable per [place] Fall risk assessment) pt bed low and locked, pt accompanied to bathroom  Continuous monitoring: Cardiac Leads, Pulse Ox, or Blood Pressure  IVF/IV medications: Not Applicable   Oxygen: Room Air  Bedside sitter: Not Applicable   Isolation: Not Applicable    Patient to CT.

## 2024-03-29 ENCOUNTER — TELEPHONE (OUTPATIENT)
Dept: HEALTH INFORMATION MANAGEMENT | Facility: OTHER | Age: 84
End: 2024-03-29
Payer: MEDICARE

## 2024-03-29 ENCOUNTER — TELEPHONE (OUTPATIENT)
Dept: MEDICAL GROUP | Facility: LAB | Age: 84
End: 2024-03-29
Payer: MEDICARE

## 2024-03-29 NOTE — TELEPHONE ENCOUNTER
1. Caller Name: Skylar                           Call Back Number: [phone] (home)        How would the patient prefer to be contacted with a response: Phone call OK to leave a detailed message    Patients daughter called in and had some questions regarding care after the ER. She also had some concerns regarding some appointments to. I did try and answer some questions but did let her know that I can't give any medical advise. I did let her know that I would send this over to this pool so her questions can be answered better and hopefully gets pointed in the right directions.   
See Telephone encounter   
Wash your hands. Wear your mask. Worsening, continued or ANY new concerning symptoms return to the emergency department.

## 2024-03-29 NOTE — TELEPHONE ENCOUNTER
Sunday night the patient started having general fatigue and increasing HARRIS. Her Dtr Skylar called neurosurgery and spoke to ahsan, the provider on call who subsequently ordered a STAT head CT for Monday.      Patient ended up going to ER by EMS and having CT done there. She had tested positive for COVID at home prior to going to ED.    Skylar called today reporting problems communicating with the neurosurgery office regarding appt scheduling. I was able to speak with Ruben  at [facility], confirmed phone numbers and they have made contact and now have a telephone appointment scheduled for today with ALINA Coronado.     I also scheduled the patient w/ PCP for next Monday.     Patient has been afebrile, remains generally weak/fatigued.

## 2024-04-01 ENCOUNTER — HOSPITAL ENCOUNTER (OUTPATIENT)
Dept: LAB | Facility: MEDICAL CENTER | Age: 84
End: 2024-04-01
Attending: FAMILY MEDICINE
Payer: MEDICARE

## 2024-04-01 ENCOUNTER — APPOINTMENT (OUTPATIENT)
Dept: MEDICAL GROUP | Facility: LAB | Age: 84
End: 2024-04-01
Payer: MEDICARE

## 2024-04-01 VITALS
SYSTOLIC BLOOD PRESSURE: 122 MMHG | BODY MASS INDEX: 19.51 KG/M2 | HEIGHT: 62 IN | RESPIRATION RATE: 18 BRPM | TEMPERATURE: 98.4 F | OXYGEN SATURATION: 94 % | HEART RATE: 72 BPM | WEIGHT: 106 LBS | DIASTOLIC BLOOD PRESSURE: 76 MMHG

## 2024-04-01 DIAGNOSIS — M62.838 LEG MUSCLE SPASM: ICD-10-CM

## 2024-04-01 DIAGNOSIS — U07.1 COVID: ICD-10-CM

## 2024-04-01 DIAGNOSIS — R79.0 ABNORMAL LEVEL OF BLOOD MINERAL: ICD-10-CM

## 2024-04-01 DIAGNOSIS — S06.5X0A POST-TRAUMATIC SUBDURAL HEMATOMA, WITHOUT LOSS OF CONSCIOUSNESS, INITIAL ENCOUNTER (HCC): ICD-10-CM

## 2024-04-01 DIAGNOSIS — E16.1 IDIOPATHIC POSTPRANDIAL HYPOGLYCEMIA: ICD-10-CM

## 2024-04-01 LAB
ANION GAP SERPL CALC-SCNC: 12 MMOL/L (ref 7–16)
BASOPHILS # BLD AUTO: 0.8 % (ref 0–1.8)
BASOPHILS # BLD: 0.06 K/UL (ref 0–0.12)
BUN SERPL-MCNC: 17 MG/DL (ref 8–22)
CALCIUM SERPL-MCNC: 9.4 MG/DL (ref 8.5–10.5)
CHLORIDE SERPL-SCNC: 103 MMOL/L (ref 96–112)
CO2 SERPL-SCNC: 25 MMOL/L (ref 20–33)
CREAT SERPL-MCNC: 0.82 MG/DL (ref 0.5–1.4)
EOSINOPHIL # BLD AUTO: 0.32 K/UL (ref 0–0.51)
EOSINOPHIL NFR BLD: 4.5 % (ref 0–6.9)
ERYTHROCYTE [DISTWIDTH] IN BLOOD BY AUTOMATED COUNT: 42.5 FL (ref 35.9–50)
FASTING STATUS PATIENT QL REPORTED: NORMAL
GFR SERPLBLD CREATININE-BSD FMLA CKD-EPI: 70 ML/MIN/1.73 M 2
GLUCOSE SERPL-MCNC: 94 MG/DL (ref 65–99)
HCT VFR BLD AUTO: 42.4 % (ref 37–47)
HGB BLD-MCNC: 14.3 G/DL (ref 12–16)
IMM GRANULOCYTES # BLD AUTO: 0.02 K/UL (ref 0–0.11)
IMM GRANULOCYTES NFR BLD AUTO: 0.3 % (ref 0–0.9)
LYMPHOCYTES # BLD AUTO: 0.88 K/UL (ref 1–4.8)
LYMPHOCYTES NFR BLD: 12.4 % (ref 22–41)
MAGNESIUM SERPL-MCNC: 2 MG/DL (ref 1.5–2.5)
MCH RBC QN AUTO: 32.5 PG (ref 27–33)
MCHC RBC AUTO-ENTMCNC: 33.7 G/DL (ref 32.2–35.5)
MCV RBC AUTO: 96.4 FL (ref 81.4–97.8)
MONOCYTES # BLD AUTO: 0.56 K/UL (ref 0–0.85)
MONOCYTES NFR BLD AUTO: 7.9 % (ref 0–13.4)
NEUTROPHILS # BLD AUTO: 5.23 K/UL (ref 1.82–7.42)
NEUTROPHILS NFR BLD: 74.1 % (ref 44–72)
NRBC # BLD AUTO: 0 K/UL
NRBC BLD-RTO: 0 /100 WBC (ref 0–0.2)
PLATELET # BLD AUTO: 384 K/UL (ref 164–446)
PMV BLD AUTO: 10.3 FL (ref 9–12.9)
POTASSIUM SERPL-SCNC: 4.3 MMOL/L (ref 3.6–5.5)
RBC # BLD AUTO: 4.4 M/UL (ref 4.2–5.4)
SODIUM SERPL-SCNC: 140 MMOL/L (ref 135–145)
WBC # BLD AUTO: 7.1 K/UL (ref 4.8–10.8)

## 2024-04-01 PROCEDURE — 3078F DIAST BP <80 MM HG: CPT | Performed by: FAMILY MEDICINE

## 2024-04-01 PROCEDURE — 99214 OFFICE O/P EST MOD 30 MIN: CPT | Performed by: FAMILY MEDICINE

## 2024-04-01 PROCEDURE — 36415 COLL VENOUS BLD VENIPUNCTURE: CPT

## 2024-04-01 PROCEDURE — 83735 ASSAY OF MAGNESIUM: CPT

## 2024-04-01 PROCEDURE — 85025 COMPLETE CBC W/AUTO DIFF WBC: CPT

## 2024-04-01 PROCEDURE — 80048 BASIC METABOLIC PNL TOTAL CA: CPT

## 2024-04-01 PROCEDURE — 3074F SYST BP LT 130 MM HG: CPT | Performed by: FAMILY MEDICINE

## 2024-04-01 RX ORDER — ACARBOSE 25 MG/1
25 TABLET ORAL
Qty: 180 TABLET | Refills: 0 | Status: SHIPPED | OUTPATIENT
Start: 2024-04-01 | End: 2024-06-30

## 2024-04-01 ASSESSMENT — FIBROSIS 4 INDEX: FIB4 SCORE: 1.45

## 2024-04-01 NOTE — PROGRESS NOTES
Subjective:     Chief Complaint   Patient presents with    Follow-Up         HPI:   Indy presents today with follow-up subdural hematoma.  She also has since we saw her last been diagnosed with COVID3/25/24  which has facilitated some symptoms as well.  She did have a repeat CT scan done through the ED which did show fairly stable subdural hematoma but it is slightly increased shift from 3.5 mm to 3.9 mm.  This could be just a variant of measurement and not significant.    Continuing with some glucose fluctuations, long term monitor alarming when she gets to 70.   Not much appetite. Struggling to get nutrition in.   Continues with headaches. Taking lots of tylenol.     Yesterday morning had a fall reaching for a water bottle, couldn't get up due to weakness. Left leg shaking, and now right fifth finger spasming.     Left hip bruise persistent, not tender.     Some confusion and memory concerns of late as well. Missing days, or not knowing what day it is.       Current Outpatient Medications Ordered in Epic   Medication Sig Dispense Refill    traZODone (DESYREL) 50 MG Tab Take 1 Tablet by mouth at bedtime. 90 Tablet 1    acetaminophen (TYLENOL) 325 MG Tab Take 2 Tablets by mouth every 6 hours as needed for Moderate Pain or Mild Pain.      levETIRAcetam (KEPPRA) 500 MG Tab Take 1 Tablet by mouth every 12 hours for 30 days. 60 Tablet 0    prochlorperazine (COMPAZINE) 5 MG Tab Take 1 Tablet by mouth every 6 hours as needed for Nausea/Vomiting. 15 Tablet 0    nitroglycerin (NITROSTAT) 0.4 MG SL Tab Place 0.4 mg under the tongue as needed for Chest Pain.      verapamil ER (CALAN SR) 120 MG CAPSULE SR 24 HR Take 120 mg by mouth 2 times a day.      Cyanocobalamin (B-12) 1000 MCG Tab Take 1,000 mcg by mouth every day.      bimatoprost (LUMIGAN) 0.03 % Solution Administer 1 Drop into both eyes every evening.      cholestyramine (QUESTRAN) 4 GM/DOSE powder Take 4 g by mouth every day for 90 days. 348.6 g 0     No current  "Epic-ordered facility-administered medications on file.         ROS:  Gen: no fevers/chills, no changes in weight  Eyes: no changes in vision  ENT: no sore throat, no hearing loss, no bloody nose  Pulm: no sob, no cough  CV: no chest pain, no palpitations  GI: no nausea/vomiting, no diarrhea  : no dysuria  MSk: no myalgias  Skin: no rash  Neuro: no headaches, no numbness/tingling  Heme/Lymph: no easy bruising      Objective:     Exam:  /76 (BP Location: Right arm, Patient Position: Sitting, BP Cuff Size: Adult)   Pulse 72   Temp 36.9 °C (98.4 °F)   Resp 18   Ht 1.575 m (5' 2\")   Wt 48.1 kg (106 lb)   SpO2 94%   BMI 19.39 kg/m²  Body mass index is 19.39 kg/m².    Gen: Alert and oriented, No apparent distress.  Neck: Neck is supple without lymphadenopathy.  Lungs: Normal effort, CTA bilaterally, no wheezes, rhonchi, or rales  CV: Regular rate and rhythm. No murmurs, rubs, or gallops.  Ext: No clubbing, cyanosis, edema.      Assessment & Plan:     83 y.o. female with the following -     1. Leg muscle spasm  To make sure that she is not getting into any nutritional or electrolyte deficits given poor appetite and recent COVID.  She will get some labs done today since she is here.  We discussed the importance of pushing nutrition in general and really focusing on not limiting her protein or fat content.  Would like to have her not go to [place] with sugars due to some postprandial hypoglycemia but I think that the acarbose may help us protect from that and I rather her just get some nutrition and versus not.  - Basic Metabolic Panel; Future  - CBC WITH DIFFERENTIAL; Future  - MAGNESIUM; Future    2. COVID    - CBC WITH DIFFERENTIAL; Future    3. Abnormal level of blood mineral    - MAGNESIUM; Future    4. Idiopathic postprandial hypoglycemia  Her glucometer settings were adjusted today to change the alarm from 70-85.  Would like to be a little bit more aggressive with regard to her glucose supplementation " as needed and not wait till she is too low.  She already has a history of postprandial hypoglycemia which has been somewhat idiopathic.  On the previous position in [place] she was placed on acarbose and actually did fairly well with this so we will see point back to this to be helpful as well  - acarbose (PRECOSE) 25 MG tablet; Take 1 Tablet by mouth 2 times a day for 90 days.  Dispense: 180 Tablet; Refill: 0    5.  Subdural hematoma  Really like her to get into see one of the docs at the neurosurgery clinic.  They were told that her imaging had drastically improved however the read when I look at it size that things are pretty stable and the midline shift is actually a little bit increased from previously.  Will see if we can call over to that clinic to see if we get her in for acutely since you are having problems following themselves.  For now she will go ahead and stay on her Keppra dosing given that this could be something that is causing some increased fatigue and mental fogginess.  If neurosurgery is comfortable taking her off of this then that is fine but I like them to see her first.        No follow-ups on file.    Please note that this dictation was created using voice recognition software. I have made every reasonable attempt to correct obvious errors, but I expect that there are errors of grammar and possibly content that I did not discover before finalizing the note.

## 2024-04-02 ENCOUNTER — NON-PROVIDER VISIT (OUTPATIENT)
Dept: CARDIOLOGY | Facility: MEDICAL CENTER | Age: 84
End: 2024-04-02
Attending: FAMILY MEDICINE
Payer: MEDICARE

## 2024-04-02 DIAGNOSIS — I48.0 PAROXYSMAL ATRIAL FIBRILLATION (HCC): ICD-10-CM

## 2024-04-02 NOTE — PROGRESS NOTES
Home enrollment completed in the 14 day Zio XT Holter monitor per Saige Patel MD.  Monitor will be shipped to patient via [facility], pending EOS.

## 2024-04-03 ENCOUNTER — TELEPHONE (OUTPATIENT)
Dept: MEDICAL GROUP | Facility: LAB | Age: 84
End: 2024-04-03
Payer: MEDICARE

## 2024-04-03 NOTE — TELEPHONE ENCOUNTER
1. Caller Name: daughter ORESTES                        Call Back Number: [phone]      How would the patient prefer to be contacted with a response:     Voicemail left from Orestes stating she has concerns with patients cognitive decline. She also stated the neurologist gave her mother a medication that does have side effects causing brain fog, drowsiness. Orestes would like a call back.   I attempted to return call, left voicemail.

## 2024-04-08 ENCOUNTER — APPOINTMENT (OUTPATIENT)
Dept: CARDIOLOGY | Facility: MEDICAL CENTER | Age: 84
End: 2024-04-08
Attending: FAMILY MEDICINE
Payer: MEDICARE

## 2024-04-17 ENCOUNTER — TELEPHONE (OUTPATIENT)
Dept: HEALTH INFORMATION MANAGEMENT | Facility: OTHER | Age: 84
End: 2024-04-17

## 2024-04-23 ENCOUNTER — OFFICE VISIT (OUTPATIENT)
Dept: MEDICAL GROUP | Facility: LAB | Age: 84
End: 2024-04-23
Payer: MEDICARE

## 2024-04-23 VITALS
HEIGHT: 62 IN | BODY MASS INDEX: 19.21 KG/M2 | DIASTOLIC BLOOD PRESSURE: 56 MMHG | RESPIRATION RATE: 14 BRPM | TEMPERATURE: 97.5 F | HEART RATE: 80 BPM | WEIGHT: 104.4 LBS | OXYGEN SATURATION: 95 % | SYSTOLIC BLOOD PRESSURE: 110 MMHG

## 2024-04-23 DIAGNOSIS — E16.1 POSTPRANDIAL HYPOGLYCEMIA: ICD-10-CM

## 2024-04-23 DIAGNOSIS — S06.5X0A POST-TRAUMATIC SUBDURAL HEMATOMA, WITHOUT LOSS OF CONSCIOUSNESS, INITIAL ENCOUNTER (HCC): ICD-10-CM

## 2024-04-23 DIAGNOSIS — F51.01 PRIMARY INSOMNIA: ICD-10-CM

## 2024-04-23 PROCEDURE — 3078F DIAST BP <80 MM HG: CPT | Performed by: FAMILY MEDICINE

## 2024-04-23 PROCEDURE — 3074F SYST BP LT 130 MM HG: CPT | Performed by: FAMILY MEDICINE

## 2024-04-23 PROCEDURE — 99214 OFFICE O/P EST MOD 30 MIN: CPT | Performed by: FAMILY MEDICINE

## 2024-04-23 RX ORDER — LEVETIRACETAM 500 MG/1
500 TABLET ORAL 2 TIMES DAILY
COMMUNITY
Start: 2024-04-12

## 2024-04-23 RX ORDER — ALPRAZOLAM 0.25 MG/1
0.25 TABLET ORAL NIGHTLY PRN
Qty: 30 TABLET | Refills: 0 | Status: SHIPPED | OUTPATIENT
Start: 2024-04-23 | End: 2024-05-23

## 2024-04-23 ASSESSMENT — FIBROSIS 4 INDEX: FIB4 SCORE: 1.07

## 2024-04-23 NOTE — PROGRESS NOTES
Subjective:     Chief Complaint   Patient presents with    Hospital Follow-up         HPI:   Indy presents today with hospital follow-up.  She actually did end up having a subdural bleed evacuation with bur hole done at [facility].  Initial fall was early March.  See ER notes via [facility].  She has since followed up with [facility] neurosurgery in the office and has an appointment to follow-up with Dr. Eason in the next few weeks.  He was told to remain on Keppra until she sees Dr. Eason back in the office.    Not sleeping great at night time. In the past has used xanax, just a quarter of a dose, and this helped. Waking in the night, hard to get back to sleep.  She has been using 2 Tylenol at night because this has been helping her sleep somewhat.  Reports that headaches are much better since she had the bur hole placed.  No issues with drainage from the scalp.  She has been using baby shampoo to wash her hair.    Diet: Little bit more nutritious foods, more volume.   Appetite is better.   Doing well with acarbose for hypoglycemia. Doing well with this.   She is having some issues with her long term glucose monitor. Sometimes 40 points off.     Current Outpatient Medications Ordered in Epic   Medication Sig Dispense Refill    levETIRAcetam (KEPPRA) 500 MG Tab Take 500 mg by mouth 2 times a day.      ALPRAZolam (XANAX) 0.25 MG Tab Take 1 Tablet by mouth at bedtime as needed for Sleep for up to 30 days. 30 Tablet 0    acarbose (PRECOSE) 25 MG tablet Take 1 Tablet by mouth 2 times a day for 90 days. 180 Tablet 0    traZODone (DESYREL) 50 MG Tab Take 1 Tablet by mouth at bedtime. 90 Tablet 1    acetaminophen (TYLENOL) 325 MG Tab Take 2 Tablets by mouth every 6 hours as needed for Moderate Pain or Mild Pain.      prochlorperazine (COMPAZINE) 5 MG Tab Take 1 Tablet by mouth every 6 hours as needed for Nausea/Vomiting. 15 Tablet 0    nitroglycerin (NITROSTAT) 0.4 MG SL Tab Place 0.4 mg under the tongue as needed for  "Chest Pain.      verapamil ER (CALAN SR) 120 MG CAPSULE SR 24 HR Take 120 mg by mouth 2 times a day.      Cyanocobalamin (B-12) 1000 MCG Tab Take 1,000 mcg by mouth every day.      bimatoprost (LUMIGAN) 0.03 % Solution Administer 1 Drop into both eyes every evening.       No current Epic-ordered facility-administered medications on file.         ROS:  Gen: no fevers/chills, no changes in weight  Eyes: no changes in vision  ENT: no sore throat, no hearing loss, no bloody nose  Pulm: no sob, no cough  CV: no chest pain, no palpitations  GI: no nausea/vomiting, no diarrhea  : no dysuria  MSk: no myalgias  Skin: no rash  Neuro: no headaches, no numbness/tingling  Heme/Lymph: no easy bruising      Objective:     Exam:  /56 (BP Location: Right arm, Patient Position: Sitting, BP Cuff Size: Adult)   Pulse 80   Temp 36.4 °C (97.5 °F)   Resp 14   Ht 1.575 m (5' 2\")   Wt 47.4 kg (104 lb 6.4 oz)   SpO2 95%   BMI 19.10 kg/m²  Body mass index is 19.1 kg/m².    Gen: Alert and oriented, No apparent distress.  HEENT: Surgical incision from bur hole at the right temporal/parietal aspect.  Nontender.  Well-healed.  No erythema or drainage noted.  Neck: Neck is supple without lymphadenopathy.  Lungs: Normal effort, CTA bilaterally, no wheezes, rhonchi, or rales  CV: Regular rate and rhythm.  Systolic murmur present.  Ext: No clubbing, cyanosis, edema.      Assessment & Plan:     83 y.o. female with the following -     1. Primary insomnia  She typically uses a quarter of alprazolam at nighttime for sleep.  Has done this in the past and has worked well.  I am okay with her trying this.  If this does not work at the lowest dose that she is using but the order then she will let me know and we can adjust as needed.  We did discuss not taking the Tylenol with this to see if the alprazolam works just by itself.  Discussed monitoring for any hypersomnolence or dizziness.  She does have family involvement.  - ALPRAZolam (XANAX) " 0.25 MG Tab; Take 1 Tablet by mouth at bedtime as needed for Sleep for up to 30 days.  Dispense: 30 Tablet; Refill: 0    2. Post-traumatic subdural hematoma, without loss of consciousness, initial encounter ([facility])  Doing much better now post bur hole.  She does have an appointment for CT scan in mid May and then following with Dr. Eason thereafter.    3. Postprandial hypoglycemia  She will continue her acarbose.  She does have a fingerstick back up for if her long-term glucometer is giving her strange readings.  We did discuss really working on nutrition in general.  She can certainly trickling carbs here and there and not be too worried about keeping extremely low-carb diet.            No follow-ups on file.    Please note that this dictation was created using voice recognition software. I have made every reasonable attempt to correct obvious errors, but I expect that there are errors of grammar and possibly content that I did not discover before finalizing the note.

## 2024-05-06 ENCOUNTER — TELEPHONE (OUTPATIENT)
Dept: CARDIOLOGY | Facility: MEDICAL CENTER | Age: 84
End: 2024-05-06
Payer: MEDICARE

## 2024-05-06 NOTE — TELEPHONE ENCOUNTER
ZioXT EOS to BN for review on 5/7/24 as ADD  Monitor noted:  33 episodes of SVT , with an avg rate of 121 BPM  Sinus , with an avg rate of 66 BPM  Rare ectopics  No patient events

## 2024-05-08 ENCOUNTER — HOSPITAL ENCOUNTER (OUTPATIENT)
Dept: RADIOLOGY | Facility: MEDICAL CENTER | Age: 84
End: 2024-05-08
Attending: NURSE PRACTITIONER
Payer: MEDICARE

## 2024-05-08 DIAGNOSIS — S01.90XD SUBDURAL HEMORRHAGE, POSTINJURY, OPEN WITHOUT LOSS OF CONSCIOUSNESS, SUBSEQUENT ENCOUNTER: ICD-10-CM

## 2024-05-08 DIAGNOSIS — S06.5X0D SUBDURAL HEMORRHAGE, POSTINJURY, OPEN WITHOUT LOSS OF CONSCIOUSNESS, SUBSEQUENT ENCOUNTER: ICD-10-CM

## 2024-05-13 ENCOUNTER — APPOINTMENT (OUTPATIENT)
Dept: RADIOLOGY | Facility: MEDICAL CENTER | Age: 84
End: 2024-05-13
Attending: NURSE PRACTITIONER
Payer: MEDICARE

## 2024-05-14 ENCOUNTER — OFFICE VISIT (OUTPATIENT)
Dept: MEDICAL GROUP | Facility: LAB | Age: 84
End: 2024-05-14
Payer: MEDICARE

## 2024-05-14 VITALS
HEIGHT: 62 IN | TEMPERATURE: 96.6 F | OXYGEN SATURATION: 94 % | RESPIRATION RATE: 14 BRPM | WEIGHT: 107.81 LBS | HEART RATE: 68 BPM | BODY MASS INDEX: 19.84 KG/M2 | DIASTOLIC BLOOD PRESSURE: 64 MMHG | SYSTOLIC BLOOD PRESSURE: 118 MMHG

## 2024-05-14 DIAGNOSIS — M25.472 LEFT ANKLE SWELLING: ICD-10-CM

## 2024-05-14 PROCEDURE — 3074F SYST BP LT 130 MM HG: CPT | Performed by: STUDENT IN AN ORGANIZED HEALTH CARE EDUCATION/TRAINING PROGRAM

## 2024-05-14 PROCEDURE — 99213 OFFICE O/P EST LOW 20 MIN: CPT | Performed by: STUDENT IN AN ORGANIZED HEALTH CARE EDUCATION/TRAINING PROGRAM

## 2024-05-14 PROCEDURE — 3078F DIAST BP <80 MM HG: CPT | Performed by: STUDENT IN AN ORGANIZED HEALTH CARE EDUCATION/TRAINING PROGRAM

## 2024-05-14 ASSESSMENT — FIBROSIS 4 INDEX: FIB4 SCORE: 1.07

## 2024-05-14 ASSESSMENT — ENCOUNTER SYMPTOMS
FEVER: 0
SHORTNESS OF BREATH: 0
CHILLS: 0

## 2024-05-14 NOTE — ASSESSMENT & PLAN NOTE
Acute  -likely self limiting   -recommend epson salt bath and elevation  -if symptoms do not improve in 6 days will consider ankle xray  -patient will notify office is symptoms suddenly worsen   -acetaminophen recommended for pain

## 2024-05-14 NOTE — PROGRESS NOTES
"Subjective:     CC:   Chief Complaint   Patient presents with    Foot Swelling        HPI:     Problem   Left Ankle Swelling    -started roughly 6 days ago  -she did have associated pain on the lateral side of her ankle as well  -sh does report her symptoms are improving   -she has had ankle surgery in the past   -she does not recall a specific trauma          Current Outpatient Medications Ordered in Epic   Medication Sig Dispense Refill    levETIRAcetam (KEPPRA) 500 MG Tab Take 500 mg by mouth 2 times a day.      ALPRAZolam (XANAX) 0.25 MG Tab Take 1 Tablet by mouth at bedtime as needed for Sleep for up to 30 days. 30 Tablet 0    acarbose (PRECOSE) 25 MG tablet Take 1 Tablet by mouth 2 times a day for 90 days. 180 Tablet 0    traZODone (DESYREL) 50 MG Tab Take 1 Tablet by mouth at bedtime. 90 Tablet 1    acetaminophen (TYLENOL) 325 MG Tab Take 2 Tablets by mouth every 6 hours as needed for Moderate Pain or Mild Pain.      prochlorperazine (COMPAZINE) 5 MG Tab Take 1 Tablet by mouth every 6 hours as needed for Nausea/Vomiting. 15 Tablet 0    nitroglycerin (NITROSTAT) 0.4 MG SL Tab Place 0.4 mg under the tongue as needed for Chest Pain.      verapamil ER (CALAN SR) 120 MG CAPSULE SR 24 HR Take 120 mg by mouth 2 times a day.      Cyanocobalamin (B-12) 1000 MCG Tab Take 1,000 mcg by mouth every day.      bimatoprost (LUMIGAN) 0.03 % Solution Administer 1 Drop into both eyes every evening.       No current Epic-ordered facility-administered medications on file.           ROS:  Review of Systems   Constitutional:  Negative for chills and fever.   Respiratory:  Negative for shortness of breath.    Cardiovascular:  Negative for chest pain.   Musculoskeletal:  Positive for joint pain.       Objective:     Exam:  /64 (BP Location: Right arm, Patient Position: Sitting, BP Cuff Size: Adult)   Pulse 68   Temp 35.9 °C (96.6 °F) (Temporal)   Resp 14   Ht 1.575 m (5' 2\")   Wt 48.9 kg (107 lb 12.9 oz)   SpO2 94%   BMI " 19.72 kg/m²  Body mass index is 19.72 kg/m².    Physical Exam  Constitutional:       General: She is not in acute distress.     Appearance: She is not ill-appearing.   Pulmonary:      Effort: Pulmonary effort is normal.   Neurological:      Mental Status: She is alert.   Psychiatric:         Mood and Affect: Mood normal.         Behavior: Behavior normal.         Thought Content: Thought content normal.         Judgment: Judgment normal.                   Assessment & Plan:     Problem List Items Addressed This Visit       Left ankle swelling     Acute  -likely self limiting   -recommend epson salt bath and elevation  -if symptoms do not improve in 6 days will consider ankle xray  -patient will notify office is symptoms suddenly worsen   -acetaminophen recommended for pain                Please note that this dictation was created using voice recognition software. I have made every reasonable attempt to correct obvious errors, but I expect that there are errors of grammar and possibly content that I did not discover before finalizing the note.

## 2024-05-17 ENCOUNTER — OFFICE VISIT (OUTPATIENT)
Dept: CARDIOLOGY | Facility: MEDICAL CENTER | Age: 84
End: 2024-05-17
Attending: FAMILY MEDICINE
Payer: MEDICARE

## 2024-05-17 VITALS
DIASTOLIC BLOOD PRESSURE: 60 MMHG | WEIGHT: 103 LBS | SYSTOLIC BLOOD PRESSURE: 114 MMHG | HEART RATE: 68 BPM | BODY MASS INDEX: 18.95 KG/M2 | OXYGEN SATURATION: 94 % | RESPIRATION RATE: 15 BRPM | HEIGHT: 62 IN

## 2024-05-17 DIAGNOSIS — S06.5X0A POST-TRAUMATIC SUBDURAL HEMATOMA, WITHOUT LOSS OF CONSCIOUSNESS, INITIAL ENCOUNTER (HCC): ICD-10-CM

## 2024-05-17 DIAGNOSIS — R55 SYNCOPE AND COLLAPSE: ICD-10-CM

## 2024-05-17 DIAGNOSIS — I48.91 ATRIAL FIBRILLATION WITH RVR (HCC): ICD-10-CM

## 2024-05-17 DIAGNOSIS — I48.0 PAROXYSMAL ATRIAL FIBRILLATION (HCC): ICD-10-CM

## 2024-05-17 LAB — EKG IMPRESSION: NORMAL

## 2024-05-17 PROCEDURE — 3074F SYST BP LT 130 MM HG: CPT | Performed by: NURSE PRACTITIONER

## 2024-05-17 PROCEDURE — 3078F DIAST BP <80 MM HG: CPT | Performed by: NURSE PRACTITIONER

## 2024-05-17 PROCEDURE — 99214 OFFICE O/P EST MOD 30 MIN: CPT | Performed by: NURSE PRACTITIONER

## 2024-05-17 PROCEDURE — 2023F DILAT RTA XM W/O RTNOPTHY: CPT | Performed by: NURSE PRACTITIONER

## 2024-05-17 PROCEDURE — 93010 ELECTROCARDIOGRAM REPORT: CPT | Performed by: INTERNAL MEDICINE

## 2024-05-17 ASSESSMENT — FIBROSIS 4 INDEX: FIB4 SCORE: 1.07

## 2024-05-17 NOTE — PROGRESS NOTES
"Cardiology Follow up Note    DOS: 5/17/2024  [id number]  Indy Patel    Chief complaint/Reason for consult: monitor, syncopal episode, subdural bleed    HPI: Pt is a 83 y.o. female who presents to the clinic today in follow up for monitor and syncopal episode. Patient has a past medical history significant for but not limited to: postprandial hypoglycemia, GERD, vertigo, hypertension, migraine, paroxysmal atrial fibrillation, syncope, PSVT. Patient this year has had to undergo bur hole drainage for fall and traumatic injury to head in March. Patient recently wore a 2 week monitor which showed some SVT episodes, longest being 10 beats, no heart blocks or pauses, and low burden of ectopy. She does not think these feelings of lightheadedness are due to hypoglycemia because they do not feel as other episodes in the past have felt. Did discuss that we could do a loop recorder to ascertain if there is any other electrical problems. No events with any patient symptoms correlation. Can order echocardiogram to assess for any valvular abnormalities that could cause syncope. Patient atrial fibrillation diagnosis should be treated with anticoagulation, except for recent bleed. No atrial fibrillation on monitor. Long term due to recent subdural hematoma, Watchman may be best way to treat stroke risk. CHADS score 5.       Past Medical History:   Diagnosis Date    CATARACT     surgically corrected left    Colonic polyps     GERD (gastroesophageal reflux disease)     Glucose intolerance     \"reactive hypoglycemia\"    Heart burn     gerd    Heart murmur     History of fundoplication 2001    Indigestion     IPMN (intraductal papillary mucinous neoplasm) 2007    dx @ [facility] / Dr. Palmer doubts    IPMN (intraductal papillary mucinous neoplasm) 8/18/2015    Migraine     Osteopenia     Other specified disorder of intestines     diarrhea, steatorrhea    Pancreatitis chronic     ?    Personal history of venous thrombosis and " embolism 2001, 1991    leg    Reactive hypoglycemia     S/P appendectomy 1991    S/P cholecystectomy 2004    S/P hysterectomy with oophorectomy 1990    Superficial phlebitis of leg        Past Surgical History:   Procedure Laterality Date    GASTROSCOPY WITH BIOPSY  1/23/2012    Performed by KIM KIM at [facility]S    EGD W/ENDOSCOPIC ULTRASOUND  1/23/2012    Performed by KIM KIM at [facility] ORS    ERCP W/SPHINCTEROTOMY/PAPILL.  6/7/2011    Performed by KIM KIM at [facility] ORS    GASTROSCOPY WITH BIOPSY  11/18/2010    Performed by KIM KIM at [facility] ORS    EGD W/ENDOSCOPIC ULTRASOUND  11/18/2010    Performed by KIM KIM at [facility] ORS    CATARACT EXTRACTION WITH IOL  11/2010    left    EGD W/ENDOSCOPIC ULTRASOUND  10/21/2009    Performed by KIM KIM at [facility] ORS    GASTROSCOPY WITH BIOPSY  10/21/2009    Performed by KIM KIM at [facility] ORS    GASTROSCOPY WITH BIOPSY  4/25/08    Performed by KIM KIM at [facility] ORS    EGD W/ENDOSCOPIC ULTRASOUND  4/25/08    Performed by KIM KIM at [facility] ORS    NISSEN FUNDOPLICATION LAPAROSCOPIC  2003    redo    NISSEN FUNDOPLICATION LAPAROSCOPIC  2002    APPENDECTOMY  1991    GYN SURGERY      hysterectomy, bilateral salpingo-oophorectomies    OTHER ABDOMINAL SURGERY      cholecystectomy, Nissen fundoplication with Toupet reoperation, appendectomy    OTHER ORTHOPEDIC SURGERY      ankle surgery x3       Social History     Socioeconomic History    Marital status:      Spouse name: Not on file    Number of children: Not on file    Years of education: Not on file    Highest education level: Not on file   Occupational History    Not on file   Tobacco Use    Smoking status: Never    Smokeless tobacco: Never   Vaping Use    Vaping status: Not on file   Substance and Sexual Activity    Alcohol use: No      Comment: once a month    Drug use: No    Sexual activity: Not on file   Other Topics Concern    Not on file   Social History Narrative    Retired     Social Determinants of Health     Financial Resource Strain: Not on File (2019)    Received from [employer]     Financial Resource Strain     Financial Resource Strain: 0   Food Insecurity: Not on File (2019)    Received from [employer]     Food Insecurity     Food: 0   Transportation Needs: Not on File (2019)    Received from [employer]     Transportation Needs     Transportation: 0   Physical Activity: Not on File (2019)    Received from [employer]     Physical Activity     Physical Activity: 0   Stress: Not on File (2019)    Received from [employer]     Stress     Stress: 0   Social Connections: Not on File (2019)    Received from [employer]     Social Connections     Social Connections and Isolation: 0   Intimate Partner Violence: Not on file   Housing Stability: Not on File (2019)    Received from [employer]     Housing Stability     Housin       Family History   Problem Relation Age of Onset    Glasses Mother     Cancer Father         lung, smoker    Glasses Father     Heart Disease Father     Diabetes Paternal Aunt     Cancer Paternal Grandmother         breast cancer    Diabetes Paternal Grandfather     Diabetes Other     Heart Disease Other     Hypertension Other     Cancer Other        Allergies   Allergen Reactions    Metoclopramide Shortness of Breath     Other reaction(s): Other (See Comments), Other (See Comments), Other (See Comments), Other (Specify with Comments), Unspecified  Memory loss  Reglan  Memory loss  Memory loss  Reglan  Reglan  Reglan    Amitriptyline Unspecified     Dry mouth tremor  Imipramine     Dry mouth tremor  Imipramine     Dry mouth tremor  Imipramine       Other reaction(s): Other (See Comments), Other (Specify with Comments), Unspecified  Dry mouth tremor  Imipramine     Dry mouth tremor  Imipramine     Dry mouth tremor   Imipramine     Dry mouth tremor  Imipramine     Dry mouth tremor  Imipramine     Dry mouth tremor  Imipramine       Boniva [Ibandronate Sodium] Unspecified     Severe pain    Carvedilol Vomiting    Ciprofloxacin Hives and Unspecified     Other reaction(s): Other (See Comments)  Not sure why it is here    Denosumab      Severe pain  Other reaction(s): Other (See Comments), Other (Specify with Comments)  Severe prolonged myalgias  Severe pain throughout body.  Severe prolonged myalgias  Severe pain    Gabapentin Unspecified     ??? Side effect  ??? Side effect    Other reaction(s): Other (See Comments), Unspecified  ??? Side effect  ??? Side effect  ??? Side effect  ??? Side effect    Hydralazine Palpitations     Afib rvr    Hydrocodone-Acetaminophen Vomiting and Nausea    Ibandronic Acid      Other reaction(s): Unspecified  Severe pain  Other reaction(s): Other (See Comments), Other (Specify with Comments)  Myalgia  Myalgia  Myalgia      Metformin Unspecified     Blood glucose dropped lower  Blood glucose dropped lower  Blood glucose dropped lower    Other reaction(s): Other (See Comments), Other (See Comments), Other (Specify with Comments), Unspecified  Blood glucose dropped lower  Blood glucose dropped lower  Blood glucose dropped lower  Blood glucose dropped lower  Blood glucose dropped lowerBlood glucose dropped lower    Metoclopramide Hcl Unspecified     Reglan  Reglan      Metoprolol Vomiting    Ondansetron Unspecified     Possible atrial fibrillation  Other reaction(s): Arrhythmia  Possible atrial fibrillation  Other reaction(s): Unspecified  Possible atrial fibrillation      Oxycodone-Acetaminophen Unspecified and Vomiting     Other reaction(s): Dizziness  Other reaction(s): Other (See Comments), Unspecified    Paroxetine Hcl Unspecified     Nervous insomnia  Nervous insomnia    Other reaction(s): Other (See Comments), Unspecified  Nervous insomnia  Nervous insomnia  Nervous insomnia  Nervous insomnia     Reglan [Metoclopramide Hcl] Shortness of Breath    Vicodin [Hydrocodone-Acetaminophen] Vomiting and Nausea       Current Outpatient Medications   Medication Sig Dispense Refill    levETIRAcetam (KEPPRA) 500 MG Tab Take 500 mg by mouth 2 times a day.      ALPRAZolam (XANAX) 0.25 MG Tab Take 1 Tablet by mouth at bedtime as needed for Sleep for up to 30 days. 30 Tablet 0    traZODone (DESYREL) 50 MG Tab Take 1 Tablet by mouth at bedtime. 90 Tablet 1    acetaminophen (TYLENOL) 325 MG Tab Take 2 Tablets by mouth every 6 hours as needed for Moderate Pain or Mild Pain.      nitroglycerin (NITROSTAT) 0.4 MG SL Tab Place 0.4 mg under the tongue as needed for Chest Pain.      verapamil ER (CALAN SR) 120 MG CAPSULE SR 24 HR Take 120 mg by mouth 2 times a day.      Cyanocobalamin (B-12) 1000 MCG Tab Take 1,000 mcg by mouth every day.      bimatoprost (LUMIGAN) 0.03 % Solution Administer 1 Drop into both eyes every evening.      acarbose (PRECOSE) 25 MG tablet Take 1 Tablet by mouth 2 times a day for 90 days. 180 Tablet 0    prochlorperazine (COMPAZINE) 5 MG Tab Take 1 Tablet by mouth every 6 hours as needed for Nausea/Vomiting. (Patient not taking: Reported on 5/17/2024) 15 Tablet 0     No current facility-administered medications for this visit.       Vitals:    05/17/24 1107   BP: 114/60   Pulse: 68   Resp: 15   SpO2: 94%         Review of Systems   Constitutional: Negative.  Negative for malaise/fatigue.   HENT: Negative.     Eyes: Negative.    Respiratory: Negative.  Negative for shortness of breath and wheezing.    Cardiovascular:  Negative for chest pain, palpitations, orthopnea, claudication, leg swelling and PND.   Gastrointestinal: Negative.  Negative for nausea.   Genitourinary: Negative.    Musculoskeletal: Negative.    Skin: Negative.    Neurological: Negative.  Negative for dizziness and sensory change.   Endo/Heme/Allergies: Negative.  Does not bruise/bleed easily.   Psychiatric/Behavioral:  Negative for  "depression and hallucinations. The patient is not nervous/anxious.         Prior echo results reviewed: ordered        EKG interpreted by me: Sinus     Physical Exam  Constitutional:       Appearance: Normal appearance.   HENT:      Head: Normocephalic.   Eyes:      Pupils: Pupils are equal, round, and reactive to light.   Neck:      Vascular: No JVD.   Cardiovascular:      Rate and Rhythm: Normal rate and regular rhythm.      Pulses: Normal pulses.      Heart sounds: Normal heart sounds.   Pulmonary:      Effort: Pulmonary effort is normal.      Breath sounds: Normal breath sounds.   Abdominal:      General: Abdomen is flat.      Palpations: Abdomen is soft.   Musculoskeletal:      Cervical back: Normal range of motion.      Right lower leg: No edema.      Left lower leg: No edema.   Skin:     General: Skin is warm and dry.   Neurological:      Mental Status: She is alert and oriented to person, place, and time.   Psychiatric:         Mood and Affect: Mood normal.         Behavior: Behavior normal.          Data:  Lipids:   No results found for: \"CHOLSTRLTOT\", \"TRIGLYCERIDE\", \"HDL\", \"LDL\"     BMP:  Lab Results   Component Value Date/Time    SODIUM 140 04/01/2024 1132    POTASSIUM 4.3 04/01/2024 1132    CHLORIDE 103 04/01/2024 1132    CO2 25 04/01/2024 1132    GLUCOSE 94 04/01/2024 1132    BUN 17 04/01/2024 1132    CREATININE 0.82 04/01/2024 1132    CALCIUM 9.4 04/01/2024 1132    ANION 12.0 04/01/2024 1132       GFR:  Lab Results   Component Value Date/Time    IFAFRICA >60 11/22/2021 1754    IFNOTAFR >60 11/22/2021 1754        TSH:   Lab Results   Component Value Date/Time    TSHULTRASEN 1.200 11/22/2021 1754       MAGNESIUM:  Lab Results   Component Value Date/Time    MAGNESIUM 2.0 04/01/2024 1132    MAGNESIUM 2.2 03/12/2024 0517    MAGNESIUM 1.4 (L) 03/11/2024 0422        THYROXINE (T4):   No results found for: \"FREEDIR\"     CBC:   Lab Results   Component Value Date/Time    WBC 7.1 04/01/2024 11:32 AM    RBC 4.40 " 04/01/2024 11:32 AM    HEMOGLOBIN 14.3 04/01/2024 11:32 AM    HEMATOCRIT 42.4 04/01/2024 11:32 AM    MCV 96.4 04/01/2024 11:32 AM    MCH 32.5 04/01/2024 11:32 AM    MCHC 33.7 04/01/2024 11:32 AM    RDW 42.5 04/01/2024 11:32 AM    PLATELETCT 384 04/01/2024 11:32 AM    MPV 10.3 04/01/2024 11:32 AM    NEUTSPOLYS 74.10 (H) 04/01/2024 11:32 AM    LYMPHOCYTES 12.40 (L) 04/01/2024 11:32 AM    MONOCYTES 7.90 04/01/2024 11:32 AM    EOSINOPHILS 4.50 04/01/2024 11:32 AM    BASOPHILS 0.80 04/01/2024 11:32 AM    IMMGRAN 0.30 04/01/2024 11:32 AM    NRBC 0.00 04/01/2024 11:32 AM    NEUTS 5.23 04/01/2024 11:32 AM    LYMPHS 0.88 (L) 04/01/2024 11:32 AM    MONOS 0.56 04/01/2024 11:32 AM    EOS 0.32 04/01/2024 11:32 AM    BASO 0.06 04/01/2024 11:32 AM    IMMGRANAB 0.02 04/01/2024 11:32 AM    NRBCAB 0.00 04/01/2024 11:32 AM        CBC w/o DIFF  Lab Results   Component Value Date/Time    WBC 7.1 04/01/2024 11:32 AM    RBC 4.40 04/01/2024 11:32 AM    HEMOGLOBIN 14.3 04/01/2024 11:32 AM    MCV 96.4 04/01/2024 11:32 AM    MCH 32.5 04/01/2024 11:32 AM    MCHC 33.7 04/01/2024 11:32 AM    RDW 42.5 04/01/2024 11:32 AM    MPV 10.3 04/01/2024 11:32 AM       LIVER:  Lab Results   Component Value Date/Time    ALKPHOSPHAT 63 03/19/2024 06:41 PM    ASTSGOT 29 03/19/2024 06:41 PM    ALTSGPT 35 03/19/2024 06:41 PM    TBILIRUBIN 0.4 03/19/2024 06:41 PM       BNP:  Lab Results   Component Value Date/Time    BNPBTYPENAT 23 10/01/2013 04:10 AM       PT/INR:  Lab Results   Component Value Date/Time    PROTHROMBTM 12.7 03/10/2024 08:14 PM    PROTHROMBTM 13.7 03/10/2024 07:34 PM    PROTHROMBTM 12.5 09/02/2015 10:43 AM    INR 0.94 03/10/2024 08:14 PM    INR 1.01 03/10/2024 07:34 PM    INR 0.93 09/02/2015 10:43 AM             Impression/Plan:  Subdural hematoma, post-traumatic (HCC)   - recent monitor showed no atrial fibrillation   - short salvos of SVT   -     Paroxysmal atrial fibrillation (HCC)   - with recurrent burden should consider Watchman for stroke  protection    Syncope and collapse   - monitor did not show any electrical issues to give reason for syncope   - should consider ILR for longitudinal monitoring              A total of 30 minutes of time was spent on day of encounter reviewing medical record, performing history and examination, counseling, ordering medication/test/consults, collaborating with referring service, and documentation.    Solomon Best [facility] CenterCNP-EP  Cardiac Electrophysiology

## 2024-05-19 PROBLEM — R55 SYNCOPE AND COLLAPSE: Status: ACTIVE | Noted: 2024-05-19

## 2024-05-19 ASSESSMENT — ENCOUNTER SYMPTOMS
BRUISES/BLEEDS EASILY: 0
CONSTITUTIONAL NEGATIVE: 1
DIZZINESS: 0
ORTHOPNEA: 0
PALPITATIONS: 0
NEUROLOGICAL NEGATIVE: 1
MUSCULOSKELETAL NEGATIVE: 1
NERVOUS/ANXIOUS: 0
EYES NEGATIVE: 1
CLAUDICATION: 0
PND: 0
RESPIRATORY NEGATIVE: 1
GASTROINTESTINAL NEGATIVE: 1
DEPRESSION: 0
SENSORY CHANGE: 0
SHORTNESS OF BREATH: 0
HALLUCINATIONS: 0
WHEEZING: 0
NAUSEA: 0

## 2024-05-19 NOTE — ASSESSMENT & PLAN NOTE
- monitor did not show any electrical issues to give reason for syncope   - should consider ILR for longitudinal monitoring

## 2024-05-19 NOTE — ASSESSMENT & PLAN NOTE
- recent monitor showed no atrial fibrillation   - short salvos of SVT   -    Quality 226: Preventive Care And Screening: Tobacco Use: Screening And Cessation Intervention: Patient screened for tobacco use and is an ex/non-smoker Quality 110: Preventive Care And Screening: Influenza Immunization: Influenza Immunization Administered during Influenza season Detail Level: Detailed

## 2024-05-22 ENCOUNTER — HOSPITAL ENCOUNTER (OUTPATIENT)
Dept: CARDIOLOGY | Facility: MEDICAL CENTER | Age: 84
End: 2024-05-22
Attending: NURSE PRACTITIONER
Payer: MEDICARE

## 2024-05-22 DIAGNOSIS — R55 SYNCOPE AND COLLAPSE: ICD-10-CM

## 2024-05-22 LAB
LV EJECT FRACT  99904: 60
LV EJECT FRACT MOD 2C 99903: 63.64
LV EJECT FRACT MOD 4C 99902: 54
LV EJECT FRACT MOD BP 99901: 57.63

## 2024-05-22 PROCEDURE — 93306 TTE W/DOPPLER COMPLETE: CPT | Mod: 26 | Performed by: INTERNAL MEDICINE

## 2024-06-13 ENCOUNTER — NON-PROVIDER VISIT (OUTPATIENT)
Dept: NEUROLOGY | Facility: MEDICAL CENTER | Age: 84
End: 2024-06-13
Attending: STUDENT IN AN ORGANIZED HEALTH CARE EDUCATION/TRAINING PROGRAM
Payer: MEDICARE

## 2024-06-13 DIAGNOSIS — R56.9 SEIZURE-LIKE ACTIVITY (HCC): ICD-10-CM

## 2024-06-13 PROCEDURE — 95816 EEG AWAKE AND DROWSY: CPT | Mod: 26 | Performed by: STUDENT IN AN ORGANIZED HEALTH CARE EDUCATION/TRAINING PROGRAM

## 2024-06-13 PROCEDURE — 95816 EEG AWAKE AND DROWSY: CPT | Performed by: STUDENT IN AN ORGANIZED HEALTH CARE EDUCATION/TRAINING PROGRAM

## 2024-06-13 NOTE — PROCEDURES
OUTPATIENT ROUTINE VIDEO ELECTROENCEPHALOGRAM REPORT    REFERRING PROVIDER: .  ALINA Narvaez  DOS:  06/13/2024    STUDY DURATION:  34 minutes of total recording time.     INDICATION:  Indy Patel 83 y.o. female presenting with a history of right sided subdural hematoma s/p evacuation via bur-holes. Who had an episode of speech difficulty.     RELEVANT MEDICATIONS/TREATMENTS:   Current Outpatient Medications   Medication Instructions    acarbose (PRECOSE) 25 mg, Oral, 2 Times Daily (BID)    acetaminophen (TYLENOL) 650 mg, Oral, EVERY 6 HOURS PRN    B-12 1,000 mcg, Oral, DAILY    bimatoprost (LUMIGAN) 0.03 % Solution 1 Drop, Both Eyes, EVERY EVENING    levETIRAcetam (KEPPRA) 500 mg, Oral, 2 TIMES DAILY    nitroglycerin (NITROSTAT) 0.4 mg, Sublingual, PRN    prochlorperazine (COMPAZINE) 5 mg, Oral, EVERY 6 HOURS PRN    traZODone (DESYREL) 50 mg, Oral, EVERY BEDTIME    verapamil ER (CALAN SR) 120 mg, Oral, 2 TIMES DAILY        TECHNIQUE:   Routine VEEG was set up by a Neurodiagnostic technologist who performed education to the patient and staff. A minimum of 23 electrodes and 23 channel recording was setup and performed by Neurodiagnostic technologist, in accordance with the international 10-20 system. The study was reviewed in bipolar and referential montages. The recording examined the patient in the  awake and drowsy state(s).     DESCRIPTION OF THE RECORD:  During wakefulness, the background was continuous and showed a 9 Hz posterior dominant rhythm.  There was reactivity to eye closure/opening.  An anterior-posterior gradient was noted with faster beta frequencies seen anteriorly.  During drowsiness, theta/delta frequencies were seen.    EEG Sleep: N2 sleep architecture was not seen.    ICTAL AND INTERICTAL FINDINGS:   Occasional subtle right hemispheric theta>delta contoured slowing, at times sharply contoured and quasi-rhythmic at 3-5 Hz but not definitively epileptiform.      Ultimately, no definitively epileptiform discharges were seen    No seizures.     ACTIVATION PROCEDURES:   Intermittent Photic stimulation was performed in a stepwise fashion from 1 to 30 Hz and did not elicited any abnormalities on EEG.     EKG: Sampling of the EKG recording showed sinus rhythm    EVENTS:    None    INTERPRETATION:  Abnormal video EEG recording in the awake and drowsy state(s):  -Occasional subtle right hemispheric slowing, at times sharply contoured and quasi-rhythmic at 3-5 Hz but not definitively epileptiform. These findings are indicative on nonspecific focal dysfunction and/or underlying structural abnormality. Post-ictal slowing is also one of a number of possibilities. Clinical and radiographic correlation recommended  -No definitive epileptiform discharges were seen.  -No seizures.   -Clinical Events: None    EEG Disclaimer: This EEG does not rule out the possibility of seizures or exclude a diagnosis of epilepsy.  If the clinical suspicion remains high for seizures, a prolonged recording to capture clinical or subclinical events may be helpful.      Adrián Anand MD  Department of Neurology at [facility]  General Neurologist and Epileptologist  Director of [facility]'s Level III Comprehensive Epilepsy Program  Professor of Clinical Neurology, [facility].   Phone: [phone]  Fax: [phone]  E-mail: [email or web address]

## 2024-06-25 DIAGNOSIS — E16.1 IDIOPATHIC POSTPRANDIAL HYPOGLYCEMIA: ICD-10-CM

## 2024-06-25 RX ORDER — ACARBOSE 25 MG/1
25 TABLET ORAL 2 TIMES DAILY
Qty: 180 TABLET | Refills: 0 | Status: SHIPPED | OUTPATIENT
Start: 2024-06-25

## 2024-07-03 ENCOUNTER — APPOINTMENT (OUTPATIENT)
Dept: RADIOLOGY | Facility: MEDICAL CENTER | Age: 84
End: 2024-07-03
Attending: NURSE PRACTITIONER
Payer: MEDICARE

## 2024-07-03 DIAGNOSIS — G45.9 TRANSIENT CEREBRAL ISCHEMIA, UNSPECIFIED TYPE: ICD-10-CM

## 2024-07-03 PROCEDURE — 70547 MR ANGIOGRAPHY NECK W/O DYE: CPT

## 2024-07-03 PROCEDURE — 70544 MR ANGIOGRAPHY HEAD W/O DYE: CPT

## 2024-07-05 DIAGNOSIS — F51.01 PRIMARY INSOMNIA: ICD-10-CM

## 2024-07-05 RX ORDER — ALPRAZOLAM 0.25 MG/1
0.25 TABLET ORAL NIGHTLY PRN
Qty: 30 TABLET | Refills: 0 | Status: SHIPPED | OUTPATIENT
Start: 2024-07-05 | End: 2024-08-04

## 2024-08-16 ENCOUNTER — OFFICE VISIT (OUTPATIENT)
Dept: MEDICAL GROUP | Facility: LAB | Age: 84
End: 2024-08-16
Payer: MEDICARE

## 2024-08-16 ENCOUNTER — HOSPITAL ENCOUNTER (OUTPATIENT)
Dept: LAB | Facility: MEDICAL CENTER | Age: 84
End: 2024-08-16
Attending: FAMILY MEDICINE
Payer: MEDICARE

## 2024-08-16 VITALS
WEIGHT: 106 LBS | HEIGHT: 62 IN | RESPIRATION RATE: 16 BRPM | SYSTOLIC BLOOD PRESSURE: 120 MMHG | HEART RATE: 60 BPM | OXYGEN SATURATION: 95 % | TEMPERATURE: 98 F | DIASTOLIC BLOOD PRESSURE: 70 MMHG | BODY MASS INDEX: 19.51 KG/M2

## 2024-08-16 DIAGNOSIS — R19.7 DIARRHEA, UNSPECIFIED TYPE: ICD-10-CM

## 2024-08-16 DIAGNOSIS — R10.13 EPIGASTRIC PAIN: ICD-10-CM

## 2024-08-16 LAB
ALBUMIN SERPL BCP-MCNC: 3.7 G/DL (ref 3.2–4.9)
ALBUMIN/GLOB SERPL: 1.4 G/DL
ALP SERPL-CCNC: 63 U/L (ref 30–99)
ALT SERPL-CCNC: 17 U/L (ref 2–50)
ANION GAP SERPL CALC-SCNC: 13 MMOL/L (ref 7–16)
AST SERPL-CCNC: 19 U/L (ref 12–45)
BASOPHILS # BLD AUTO: 0.4 % (ref 0–1.8)
BASOPHILS # BLD: 0.02 K/UL (ref 0–0.12)
BILIRUB SERPL-MCNC: 0.2 MG/DL (ref 0.1–1.5)
BUN SERPL-MCNC: 17 MG/DL (ref 8–22)
CALCIUM ALBUM COR SERPL-MCNC: 9.3 MG/DL (ref 8.5–10.5)
CALCIUM SERPL-MCNC: 9.1 MG/DL (ref 8.5–10.5)
CHLORIDE SERPL-SCNC: 103 MMOL/L (ref 96–112)
CO2 SERPL-SCNC: 23 MMOL/L (ref 20–33)
CREAT SERPL-MCNC: 0.62 MG/DL (ref 0.5–1.4)
EOSINOPHIL # BLD AUTO: 0.08 K/UL (ref 0–0.51)
EOSINOPHIL NFR BLD: 1.5 % (ref 0–6.9)
ERYTHROCYTE [DISTWIDTH] IN BLOOD BY AUTOMATED COUNT: 40.1 FL (ref 35.9–50)
GFR SERPLBLD CREATININE-BSD FMLA CKD-EPI: 87 ML/MIN/1.73 M 2
GLOBULIN SER CALC-MCNC: 2.6 G/DL (ref 1.9–3.5)
GLUCOSE SERPL-MCNC: 88 MG/DL (ref 65–99)
HCT VFR BLD AUTO: 39.1 % (ref 37–47)
HGB BLD-MCNC: 13.1 G/DL (ref 12–16)
IMM GRANULOCYTES # BLD AUTO: 0.01 K/UL (ref 0–0.11)
IMM GRANULOCYTES NFR BLD AUTO: 0.2 % (ref 0–0.9)
LIPASE SERPL-CCNC: 69 U/L (ref 11–82)
LYMPHOCYTES # BLD AUTO: 1.19 K/UL (ref 1–4.8)
LYMPHOCYTES NFR BLD: 22.4 % (ref 22–41)
MCH RBC QN AUTO: 32.5 PG (ref 27–33)
MCHC RBC AUTO-ENTMCNC: 33.5 G/DL (ref 32.2–35.5)
MCV RBC AUTO: 97 FL (ref 81.4–97.8)
MONOCYTES # BLD AUTO: 0.48 K/UL (ref 0–0.85)
MONOCYTES NFR BLD AUTO: 9 % (ref 0–13.4)
NEUTROPHILS # BLD AUTO: 3.54 K/UL (ref 1.82–7.42)
NEUTROPHILS NFR BLD: 66.5 % (ref 44–72)
NRBC # BLD AUTO: 0 K/UL
NRBC BLD-RTO: 0 /100 WBC (ref 0–0.2)
PLATELET # BLD AUTO: 226 K/UL (ref 164–446)
PMV BLD AUTO: 11.4 FL (ref 9–12.9)
POTASSIUM SERPL-SCNC: 4.3 MMOL/L (ref 3.6–5.5)
PROT SERPL-MCNC: 6.3 G/DL (ref 6–8.2)
RBC # BLD AUTO: 4.03 M/UL (ref 4.2–5.4)
SODIUM SERPL-SCNC: 139 MMOL/L (ref 135–145)
WBC # BLD AUTO: 5.3 K/UL (ref 4.8–10.8)

## 2024-08-16 PROCEDURE — 85025 COMPLETE CBC W/AUTO DIFF WBC: CPT

## 2024-08-16 PROCEDURE — 83690 ASSAY OF LIPASE: CPT

## 2024-08-16 PROCEDURE — 3078F DIAST BP <80 MM HG: CPT | Performed by: FAMILY MEDICINE

## 2024-08-16 PROCEDURE — 36415 COLL VENOUS BLD VENIPUNCTURE: CPT

## 2024-08-16 PROCEDURE — 80053 COMPREHEN METABOLIC PANEL: CPT

## 2024-08-16 PROCEDURE — 99213 OFFICE O/P EST LOW 20 MIN: CPT | Performed by: FAMILY MEDICINE

## 2024-08-16 PROCEDURE — 3074F SYST BP LT 130 MM HG: CPT | Performed by: FAMILY MEDICINE

## 2024-08-16 ASSESSMENT — FIBROSIS 4 INDEX: FIB4 SCORE: 1.07

## 2024-08-16 NOTE — PROGRESS NOTES
Subjective:     Chief Complaint   Patient presents with    GI Problem    Abdominal Pain         HPI:   Indy presents today for abdominal pain.  This has been consistent since she had her fall and brain bleed back in the spring.  She does have a history of chronic pancreatitis.    She will have some explosive diarrhea or constipation. Off keppra now. Sometimes epigastrum, sometimes lower in abdomen.   Can't find any specific dietary triggers. Prilosec OTC has seemed to be helpful.     She is eating well also. Acarbose has been helpgin with glucose drops as well.           Current Outpatient Medications Ordered in Epic   Medication Sig Dispense Refill    acarbose (PRECOSE) 25 MG tablet Take 1 tablet by mouth twice daily 180 Tablet 0    levETIRAcetam (KEPPRA) 500 MG Tab Take 500 mg by mouth 2 times a day.      traZODone (DESYREL) 50 MG Tab Take 1 Tablet by mouth at bedtime. 90 Tablet 1    acetaminophen (TYLENOL) 325 MG Tab Take 2 Tablets by mouth every 6 hours as needed for Moderate Pain or Mild Pain.      prochlorperazine (COMPAZINE) 5 MG Tab Take 1 Tablet by mouth every 6 hours as needed for Nausea/Vomiting. (Patient not taking: Reported on 5/17/2024) 15 Tablet 0    nitroglycerin (NITROSTAT) 0.4 MG SL Tab Place 0.4 mg under the tongue as needed for Chest Pain.      verapamil ER (CALAN SR) 120 MG CAPSULE SR 24 HR Take 120 mg by mouth 2 times a day.      Cyanocobalamin (B-12) 1000 MCG Tab Take 1,000 mcg by mouth every day.      bimatoprost (LUMIGAN) 0.03 % Solution Administer 1 Drop into both eyes every evening.       No current Epic-ordered facility-administered medications on file.         ROS:  Gen: no fevers/chills, no changes in weight  Eyes: no changes in vision  ENT: no sore throat, no hearing loss, no bloody nose  Pulm: no sob, no cough  CV: no chest pain, no palpitations  : no dysuria  MSk: no myalgias  Skin: no rash  Neuro: no headaches, no numbness/tingling  Heme/Lymph: no easy bruising      Objective:  "    Exam:  /70   Pulse 60   Temp 36.7 °C (98 °F)   Resp 16   Ht 1.575 m (5' 2\")   Wt 48.1 kg (106 lb)   SpO2 95%   BMI 19.39 kg/m²  Body mass index is 19.39 kg/m².  Gen: AAOx3, NAD, well appearing  HEENT: NCAT, EOMI, Nares patent, Mucosa moist  Resp: Normal chest wall rise and fall, not SOB, no tachypnea  Skin: no rash or abnormality of visible skin.   Psych: normal speech, not slurred, good insight, affect full  MSK: Moves all four limbs equally and normally, gait normal        Assessment & Plan:     83 y.o. female with the following -     1. Diarrhea, unspecified type  Discussed some potential side effect of the acarbose causing some diarrhea.  She does also have a history of pancreatitis so this could be related as well.  Will get some updated labs just to make sure were not missing anything else as a source.  If she is doing better with that Prilosec on board she can complete a 2-week course and then try to hold it and see how she does.  If symptoms come right back she should just stay on the Prilosec going forward.  She is also status postcholecystectomy and 2004 which could also be a source for diarrhea.  - CBC WITH DIFFERENTIAL; Future    2. Epigastric pain  Gastritis versus pancreatitis.  Will get some updated labs for further assessment.  Paperwork filled out today for [employer].  She is cleared to drive.  - LIPASE; Future  - Comp Metabolic Panel; Future            No follow-ups on file.    Please note that this dictation was created using voice recognition software. I have made every reasonable attempt to correct obvious errors, but I expect that there are errors of grammar and possibly content that I did not discover before finalizing the note.        "

## 2024-09-03 DIAGNOSIS — F51.01 PRIMARY INSOMNIA: ICD-10-CM

## 2024-09-04 NOTE — TELEPHONE ENCOUNTER
Received request via: Pharmacy    Was the patient seen in the last year in this department? Yes  8/16/24  Does the patient have an active prescription (recently filled or refills available) for medication(s) requested? No    Pharmacy Name: [facility]    Does the patient have [facility] and need 100-day supply? (This applies to ALL medications) Patient does not have SCP

## 2024-09-05 RX ORDER — ALPRAZOLAM 0.25 MG
0.25 TABLET ORAL
Qty: 30 TABLET | Refills: 0 | Status: SHIPPED | OUTPATIENT
Start: 2024-09-05 | End: 2024-10-05

## 2024-09-20 DIAGNOSIS — E16.1 IDIOPATHIC POSTPRANDIAL HYPOGLYCEMIA: ICD-10-CM

## 2024-09-20 RX ORDER — ACARBOSE 25 MG/1
25 TABLET ORAL 2 TIMES DAILY
Qty: 180 TABLET | Refills: 0 | Status: SHIPPED | OUTPATIENT
Start: 2024-09-20

## 2024-10-29 DIAGNOSIS — F51.01 PRIMARY INSOMNIA: ICD-10-CM

## 2024-10-29 RX ORDER — ALPRAZOLAM 0.25 MG/1
0.25 TABLET ORAL
Qty: 30 TABLET | Refills: 0 | Status: SHIPPED | OUTPATIENT
Start: 2024-10-29 | End: 2024-11-28

## 2024-11-19 ENCOUNTER — OFFICE VISIT (OUTPATIENT)
Dept: MEDICAL GROUP | Facility: LAB | Age: 84
End: 2024-11-19
Payer: MEDICARE

## 2024-11-19 VITALS
HEIGHT: 62 IN | BODY MASS INDEX: 19.51 KG/M2 | RESPIRATION RATE: 16 BRPM | OXYGEN SATURATION: 93 % | SYSTOLIC BLOOD PRESSURE: 100 MMHG | DIASTOLIC BLOOD PRESSURE: 60 MMHG | WEIGHT: 106 LBS | HEART RATE: 64 BPM | TEMPERATURE: 97.9 F

## 2024-11-19 DIAGNOSIS — R10.10 UPPER ABDOMINAL PAIN: ICD-10-CM

## 2024-11-19 DIAGNOSIS — R32 URINARY INCONTINENCE, UNSPECIFIED TYPE: ICD-10-CM

## 2024-11-19 PROCEDURE — 99214 OFFICE O/P EST MOD 30 MIN: CPT | Performed by: STUDENT IN AN ORGANIZED HEALTH CARE EDUCATION/TRAINING PROGRAM

## 2024-11-19 PROCEDURE — 3078F DIAST BP <80 MM HG: CPT | Performed by: STUDENT IN AN ORGANIZED HEALTH CARE EDUCATION/TRAINING PROGRAM

## 2024-11-19 PROCEDURE — 3074F SYST BP LT 130 MM HG: CPT | Performed by: STUDENT IN AN ORGANIZED HEALTH CARE EDUCATION/TRAINING PROGRAM

## 2024-11-19 RX ORDER — VERAPAMIL HYDROCHLORIDE 120 MG/1
120 TABLET, FILM COATED, EXTENDED RELEASE ORAL 2 TIMES DAILY
COMMUNITY
Start: 2024-09-21

## 2024-11-19 ASSESSMENT — ENCOUNTER SYMPTOMS
ABDOMINAL PAIN: 1
VOMITING: 0
DIARRHEA: 0
FEVER: 0
COUGH: 0
CHILLS: 0
SHORTNESS OF BREATH: 0
WEIGHT LOSS: 0

## 2024-11-19 ASSESSMENT — FIBROSIS 4 INDEX: FIB4 SCORE: 1.73

## 2024-11-19 NOTE — PROGRESS NOTES
Verbal consent was acquired by the patient to use [facility] ambient listening note generation during this visit Yes.    Subjective:     Chief Complaint   Patient presents with    Abdominal Pain     X 1 month       HPI:   PCP unavailable.    History of Present Illness  The patient is an 84-year-old female presenting with a month-long history of intermittent abdominal pain.    She reports experiencing intermittent abdominal discomfort for over a month. She often burps after drinking water in the morning and occasionally experiences pain under her rib. Despite not experiencing pain today, she feels uneasy. Her pain, described as cramp-like, lasted for about half an hour yesterday. She took  hyoscyamine yesterday, which seemed to alleviate her symptoms and allowed her to sleep well. She took Tylenol last week for abdominal pain without relief. She underwent a Nissen fundoplication over 20 years ago and is concerned it may have loosened. She believes her symptoms began after receiving her COVID-19 and flu vaccines. She avoids tomatoes and acidic foods. She occasionally experiences bloating, which resolves by morning. She experiences daily belching and nausea. She experienced dry heaving a month ago but reports no diarrhea, constipation, nausea, or vomiting. She reports no heartburn, chest pain, burning sensation, blood in her stool, or fevers. She called her gastroenterologist, but they don't have an appointment until 2025 so she didn't make an appointment. She is unsure of her Nexium dosage, but has been taking daily.    She has been experiencing bladder leakage, particularly when playing pickleball, but this has not occurred for the past 3 weeks.    Patient will be leaving for the [place] months soon.    Review of Systems   Constitutional:  Negative for chills, fever, malaise/fatigue and weight loss.   Respiratory:  Negative for cough and shortness of breath.    Cardiovascular:  Negative for  "chest pain.   Gastrointestinal:  Positive for abdominal pain and nausea. Negative for blood in stool, constipation, diarrhea, heartburn and vomiting.   Genitourinary:  Negative for dysuria, frequency, hematuria and urgency.   Skin:  Negative for rash.     Objective:     Exam:  /60 (BP Location: Left arm, Patient Position: Sitting, BP Cuff Size: Adult)   Pulse 64   Temp 36.6 °C (97.9 °F)   Resp 16   Ht 1.575 m (5' 2\")   Wt 48.1 kg (106 lb)   SpO2 93%   BMI 19.39 kg/m²  Body mass index is 19.39 kg/m².    Physical Exam  Vitals reviewed.   Constitutional:       General: She is not in acute distress.     Appearance: Normal appearance. She is not ill-appearing.   HENT:      Head: Normocephalic and atraumatic.      Mouth/Throat:      Mouth: Mucous membranes are moist.   Eyes:      General: No scleral icterus.  Cardiovascular:      Rate and Rhythm: Normal rate and regular rhythm.      Heart sounds: Normal heart sounds.   Pulmonary:      Effort: Pulmonary effort is normal.      Breath sounds: Normal breath sounds.   Abdominal:      General: Abdomen is flat. Bowel sounds are normal. There is no distension.      Palpations: Abdomen is soft. There is no mass.      Tenderness: There is abdominal tenderness (minimal epigastric). There is no guarding or rebound.   Skin:     General: Skin is warm and dry.      Coloration: Skin is not jaundiced.   Neurological:      General: No focal deficit present.      Mental Status: She is alert and oriented to person, place, and time.   Psychiatric:         Mood and Affect: Mood normal.         Behavior: Behavior normal.         Thought Content: Thought content normal.       Assessment & Plan:     84 y.o. female with the following -     1. Upper abdominal pain  The patient has been experiencing upper abdominal pain and discomfort for over a month, which she describes as cramping and sometimes associated with belching and nausea. Unclear prognosis. Given her history of H. pylori and " the similarity of symptoms, a breath test for H. pylori will be conducted. She is advised to discontinue Nexium for 2 weeks prior to the test. Labs as below. A prescription for hyoscyamine has been sent to her pharmacy to manage her symptoms in the meantime since she reports this already to be effective.  She called but did not schedule an appointment with gastroenterology, recommend she schedule an appointment in the event that this does not resolve, worsens or is of unclear etiology.  - HELICOBACTER PYLORI BREATH TEST; Future  - LIPASE; Future  - CBC WITH DIFFERENTIAL; Future  - Comp Metabolic Panel; Future  - hyoscyamine (LEVSIN) 0.125 MG SL Tab; Place 1 Tablet under the tongue every four hours as needed for Cramping.  Dispense: 30 Tablet; Refill: 0    2. Urinary incontinence, unspecified type  Chronic, intermittent and now resolved x 3 weeks. She reports occasional bladder leakage, particularly during physical activities like playing pickleball. Pelvic floor physical therapy is recommended as it has been shown to be effective for stress incontinence-declined referral for now. She is advised to perform Kegel exercises regularly.    Return if symptoms worsen or fail to improve.    Please note that this dictation was created using voice recognition software. I have made every reasonable attempt to correct obvious errors, but I expect that there are errors of grammar and possibly content that I did not discover before finalizing the note.

## 2024-11-20 PROBLEM — R32 URINARY INCONTINENCE: Status: ACTIVE | Noted: 2024-11-20

## 2024-11-20 ASSESSMENT — ENCOUNTER SYMPTOMS
CONSTIPATION: 0
HEARTBURN: 0
BLOOD IN STOOL: 0
NAUSEA: 1

## 2024-11-20 NOTE — PATIENT INSTRUCTIONS
If we want to do the h pylori test you will need to stop all antacids at least 2 weeks prior to test.

## 2024-11-21 ENCOUNTER — HOSPITAL ENCOUNTER (OUTPATIENT)
Dept: LAB | Facility: MEDICAL CENTER | Age: 84
End: 2024-11-21
Attending: STUDENT IN AN ORGANIZED HEALTH CARE EDUCATION/TRAINING PROGRAM
Payer: MEDICARE

## 2024-11-21 DIAGNOSIS — R10.10 UPPER ABDOMINAL PAIN: ICD-10-CM

## 2024-11-21 LAB
ALBUMIN SERPL BCP-MCNC: 4.1 G/DL (ref 3.2–4.9)
ALBUMIN/GLOB SERPL: 1.5 G/DL
ALP SERPL-CCNC: 80 U/L (ref 30–99)
ALT SERPL-CCNC: 32 U/L (ref 2–50)
ANION GAP SERPL CALC-SCNC: 8 MMOL/L (ref 7–16)
AST SERPL-CCNC: 31 U/L (ref 12–45)
BASOPHILS # BLD AUTO: 0.5 % (ref 0–1.8)
BASOPHILS # BLD: 0.03 K/UL (ref 0–0.12)
BILIRUB SERPL-MCNC: 0.4 MG/DL (ref 0.1–1.5)
BUN SERPL-MCNC: 20 MG/DL (ref 8–22)
CALCIUM ALBUM COR SERPL-MCNC: 9.7 MG/DL (ref 8.5–10.5)
CALCIUM SERPL-MCNC: 9.8 MG/DL (ref 8.5–10.5)
CHLORIDE SERPL-SCNC: 102 MMOL/L (ref 96–112)
CO2 SERPL-SCNC: 29 MMOL/L (ref 20–33)
CREAT SERPL-MCNC: 0.63 MG/DL (ref 0.5–1.4)
EOSINOPHIL # BLD AUTO: 0.08 K/UL (ref 0–0.51)
EOSINOPHIL NFR BLD: 1.3 % (ref 0–6.9)
ERYTHROCYTE [DISTWIDTH] IN BLOOD BY AUTOMATED COUNT: 40.2 FL (ref 35.9–50)
GFR SERPLBLD CREATININE-BSD FMLA CKD-EPI: 87 ML/MIN/1.73 M 2
GLOBULIN SER CALC-MCNC: 2.8 G/DL (ref 1.9–3.5)
GLUCOSE SERPL-MCNC: 87 MG/DL (ref 65–99)
HCT VFR BLD AUTO: 45.5 % (ref 37–47)
HGB BLD-MCNC: 15.5 G/DL (ref 12–16)
IMM GRANULOCYTES # BLD AUTO: 0.04 K/UL (ref 0–0.11)
IMM GRANULOCYTES NFR BLD AUTO: 0.6 % (ref 0–0.9)
LIPASE SERPL-CCNC: 16 U/L (ref 11–82)
LYMPHOCYTES # BLD AUTO: 1.04 K/UL (ref 1–4.8)
LYMPHOCYTES NFR BLD: 16.8 % (ref 22–41)
MCH RBC QN AUTO: 33 PG (ref 27–33)
MCHC RBC AUTO-ENTMCNC: 34.1 G/DL (ref 32.2–35.5)
MCV RBC AUTO: 97 FL (ref 81.4–97.8)
MONOCYTES # BLD AUTO: 0.51 K/UL (ref 0–0.85)
MONOCYTES NFR BLD AUTO: 8.3 % (ref 0–13.4)
NEUTROPHILS # BLD AUTO: 4.48 K/UL (ref 1.82–7.42)
NEUTROPHILS NFR BLD: 72.5 % (ref 44–72)
NRBC # BLD AUTO: 0 K/UL
NRBC BLD-RTO: 0 /100 WBC (ref 0–0.2)
PLATELET # BLD AUTO: 267 K/UL (ref 164–446)
PMV BLD AUTO: 10.8 FL (ref 9–12.9)
POTASSIUM SERPL-SCNC: 4.7 MMOL/L (ref 3.6–5.5)
PROT SERPL-MCNC: 6.9 G/DL (ref 6–8.2)
RBC # BLD AUTO: 4.69 M/UL (ref 4.2–5.4)
SODIUM SERPL-SCNC: 139 MMOL/L (ref 135–145)
WBC # BLD AUTO: 6.2 K/UL (ref 4.8–10.8)

## 2024-11-21 PROCEDURE — 36415 COLL VENOUS BLD VENIPUNCTURE: CPT

## 2024-11-21 PROCEDURE — 85025 COMPLETE CBC W/AUTO DIFF WBC: CPT

## 2024-11-21 PROCEDURE — 80053 COMPREHEN METABOLIC PANEL: CPT

## 2024-11-21 PROCEDURE — 83690 ASSAY OF LIPASE: CPT

## 2024-11-25 ENCOUNTER — TELEPHONE (OUTPATIENT)
Dept: MEDICAL GROUP | Facility: LAB | Age: 84
End: 2024-11-25
Payer: MEDICARE

## 2024-11-25 NOTE — TELEPHONE ENCOUNTER
Patient stated she will get the last lab done next Tuesday. She has scheduled an appointment with GI but its not until March. She is concerned about her lymphocyte low and neutrophil high. Please advise

## 2024-11-25 NOTE — TELEPHONE ENCOUNTER
----- Message from Physician Saige Patel M.D. sent at 11/25/2024 11:16 AM PST -----  Regarding: FW: lab results  Labs are benign. I do agree with scheduling with GI. Also looks like there is still a test to perform, the H. Pylori test.     Thank you,   Saige Patel MD  ----- Message -----  From: Margarita Danielle  Sent: 11/25/2024   9:59 AM PST  To: Areli Vazquez Reyes, Med Ass't; #  Subject: lab results                                      Sharon called this morning about her labs she says her pain is still there and is living on Tums and Tylenol. Could you please call her and let her know what the next steps are.

## 2024-11-25 NOTE — TELEPHONE ENCOUNTER
I called and spoke to the Pt.  I was able to answer her question about her lab results.  The pt had no other questions or concerns at the end of the call.

## 2024-12-03 ENCOUNTER — HOSPITAL ENCOUNTER (OUTPATIENT)
Dept: LAB | Facility: MEDICAL CENTER | Age: 84
End: 2024-12-03
Attending: STUDENT IN AN ORGANIZED HEALTH CARE EDUCATION/TRAINING PROGRAM
Payer: MEDICARE

## 2024-12-03 DIAGNOSIS — R10.10 UPPER ABDOMINAL PAIN: ICD-10-CM

## 2024-12-03 LAB — UREA BREATH TEST QL: NEGATIVE

## 2024-12-03 PROCEDURE — 83013 H PYLORI (C-13) BREATH: CPT

## 2024-12-04 DIAGNOSIS — F51.01 PRIMARY INSOMNIA: ICD-10-CM

## 2024-12-04 NOTE — TELEPHONE ENCOUNTER
Received request via: Pharmacy    Was the patient seen in the last year in this department? Yes    Does the patient have an active prescription (recently filled or refills available) for medication(s) requested? No    Pharmacy Name:   [facility] - [id number]  [facility] [place] [address]  Phone: [phone] Fax: [phone]       Does the patient have [facility] and need 100-day supply? (This applies to ALL medications) Patient does not have SCP

## 2024-12-05 RX ORDER — ALPRAZOLAM 0.25 MG/1
0.25 TABLET ORAL
Qty: 30 TABLET | Refills: 0 | Status: SHIPPED | OUTPATIENT
Start: 2024-12-05 | End: 2025-01-04

## 2024-12-17 DIAGNOSIS — E16.1 IDIOPATHIC POSTPRANDIAL HYPOGLYCEMIA: ICD-10-CM

## 2024-12-18 RX ORDER — ACARBOSE 25 MG/1
25 TABLET ORAL 2 TIMES DAILY
Qty: 180 TABLET | Refills: 0 | Status: SHIPPED | OUTPATIENT
Start: 2024-12-18

## 2024-12-27 ENCOUNTER — TELEPHONE (OUTPATIENT)
Dept: MEDICAL GROUP | Facility: LAB | Age: 84
End: 2024-12-27
Payer: MEDICARE

## 2024-12-27 NOTE — TELEPHONE ENCOUNTER
Patient's daughter called because she is upset with the level of care she has receive, Indy has been admitted to [facility], after complaining of abdominal pain radiating from the back to the ribs. At this time she has not received any word from her provider. At [facility] they detected a mass on her pancreas that they think may be cancerous.

## 2025-03-03 NOTE — TELEPHONE ENCOUNTER
[facility] Medicine  Progress Note    Patient Name: Juan Carlos Yoo Sr.  MRN: [id number]  Patient Class: IP- Inpatient   Admission Date: 2/21/2025  Length of Stay: 9 days  Attending Physician: Joao Villegas III, MD  Primary Care Provider: Joao Villegas III, MD        Subjective     Principal Problem:Rhinovirus infection        HPI:  ED HPI:  Juan Carlos Yoo Sr. is a 70 y.o. male with PMHX of alcoholic cirrhosis, HCC, thrombocytopenia, Afib (not on anticoagulation due to thrombocytopenia), ESRD on HD M/W/F who presents to the emergency department C/O SOB.     Patient was recently  admitted to [facility] due to severe anasarca JAE, evaluated for hepatorenal syndrome and had 40 day hospitalization.  Patient released from rehab about a week ago.  Has endorse increasing shortness of breath for the past 3 days.  States at night it is worse when trying to sleep.  Patient lays on his side and uses 3 pillows to prop his head up.  Patient went to urgent care today because he felt like tonight was going to be another bad night and they sent him to ER.  He denies fever.  States swelling is about baseline.  Had typical dialysis session today.  He reports they were trying to take off 3 L but is unsure of how much volume was removed.  He makes urine but states it is difficult to urinate due to anasarca.  He states compliance to low-sodium diet and fluid restriction since recent discharge.  Has not had alcohol in over 7 months.    IM HPI:  Patient's chart reviewed and above history noted.  Patient's been admitted for acute on chronic dyspnea and was diagnosed with rhinovirus.  Patient had been having increasing dyspnea weakness coughing with clear sputum production over the last 36 hours and ultimately presented with worsening shortness of breath.  He has been placed on oxygen nasal cannula states he is feeling much better.  Patient has a mild cough and wheezing on exam.  Patient has a complex  Received request via: Pharmacy    Was the patient seen in the last year in this department? Yes    Does the patient have an active prescription (recently filled or refills available) for medication(s) requested? No    [facility]    Does the patient have assisted Plus and need 100-day supply? (This applies to ALL medications) Patient does not have SCP   medical history that is has been reviewed he seems slightly hypervolemic we will notify his nephrologist and he will likely need his routine hemodialysis if he ends up staying through Monday.  Patient is afebrile labs noted potassium low we will replete and maybe a good candidate for Aldactone.    Overview/Hospital Course:  2/23 FM:  Patient complaining of a area of redness and pain on the right thigh above the knee.  This started yesterday.  Patient's exam consistent with a area of focal cellulitis and induration.  Ordering ultrasound and starting antibiotics.  Patient's respiratory complaints are much improved and getting back to his baseline.  Patient's nephrologist aware he is in house and may need hemodialysis in a.m..    2/24 DL:  Patient doing well this morning.  He is sitting up in bed in his awake, alert, oriented.  Patient reports respiratory status improved.  Patient now off of supplemental O2.  Patient reports continued tenderness to area of redness to right inner thigh.  Redness and induration have spread past previously marked borders.  Patient is scheduled for ultrasound today.  Continue IV antibiotics.  Patient is scheduled for hemodialysis today.  Nephrology following.    2/25 DL:  Patient doing well this morning.  He is lying in bed in his awake, alert, oriented.  Respiratory status with continued improvement.  Labs and vital signs stable.  Ultrasound right thigh obtained yesterday reveals 12.6 x 6.1 x 3.0 cm complex fluid collection to the medial right thigh.  Venous ultrasound negative for DVT.  General surgery consulted.  Continue current IV antibiotics.  Renal function stable.  Nephrology following.    2/26 DL:  Patient doing well this morning.  He is lying in bed in his awake, alert, oriented.  Respiratory status at baseline.  Vital signs stable.  Patient afebrile without leukocytosis.  Continue current antibiotic treatment for abscess to right thigh.  General surgery consulted.  Appreciate  recs.  Hypokalemia and hypomagnesemia noted this morning.  We will replenish.  Nephrology following.  Appreciate recs.    2/27 DL:  Patient lying in bed resting comfortably.  He has just returned to room from OR for I and D of right inner thigh.  Patient tolerated procedure well.  Patient is afebrile without leukocytosis this morning.  We will continue current IV antibiotic treatment for right thigh abscess pending results of culture obtained per general surgery this morning.  Patient's potassium 2.8 this morning.  Continue p.o. replacement.  We will also give IV replacement today.  Magnesium pending.  Labs and vital signs otherwise stable.  Renal function stable.  Nephrology following.    2/28 DL:  Patient is sitting up on side of bed eating breakfast.  He endorses pain to right inner thigh I&D site that is controlled with current regimen.  Wound cultures with no growth to date x1 day.  Continue current antibiotic treatment.  Continued hypokalemia noted.  Continue p.o. replacement t.i.d.  We we will also give IV replacement today.  Hypomagnesemia also noted.  We will replenish today.  Renal function stable.  Nephrology following.  Labs and vital signs otherwise stable.  Patient with continued improved respiratory status.  Continue discharge planning.    3/1 DL:  Patient doing well this morning.  He is lying in bed is awake, alert, oriented.  Patient reports pain to right thigh controlled with current regimen.  Scant drainage from I&D site.  Patient is afebrile without leukocytosis.  Anaerobic culture pending.  Aerobic culture with no growth to date.  Continue current IV antibiotics.  Respiratory status stable.  Patient with continued hypokalemia.  We will switch p.o. potassium to effervescent potassium.  Additional K riders given.  Continue monitoring with daily labs.  Continue discharge planning.      3/2 DL:  Patient lying bed awake oriented.  POD #3 I&D right thigh.  He reports pain to right thigh controlled.   Patient states he has been able to ambulate in the room without difficulty.  Scant serous drainage noted from Penrose drain.  Wound cultures with no growth and pending.  Continue current IV antibiotics.  Patient afebrile without leukocytosis.  Remaining labs pending.  We will follow up once completed and continue to replace K hypokalemia continues.  Continue current treatment plan.  Continue discharge planning.    3/3 DL:  Patient lying in bed and is awake, alert, oriented.  Pod 4. Right thigh I&D.  Pain control.  Wound cultures pending and was IV antibiotics.  Patient afebrile without leukocytosis to 3.3.  Mag 1.3.  We will replenish.  Continue current treatment plan.  Continue discharge planning.  Patient will need follow up with General surgery in outpatient setting.    Past Medical History:   Diagnosis Date    Atrial fibrillation     Bulging disc     Cirrhosis     Colon polyp 12/29/2017    Rpt 5 yrs    EV (esophageal varices)     Hypertension 03/30/2023    Renal disorder     Skin cancer 03/2017    Thrombocytopenia        Past Surgical History:   Procedure Laterality Date    CATHETERIZATION OF BOTH LEFT AND RIGHT HEART N/A 2/8/2023    Procedure: CATHETERIZATION, HEART, BOTH LEFT AND RIGHT;  Surgeon: Flo Malone MD;  Location: [facility] CATH LAB;  Service: Cardiology;  Laterality: N/A;  low bleeding risk 1.0%    CHOLECYSTECTOMY      COLONOSCOPY      COLONOSCOPY N/A 12/29/2017    ta rpt 2022    CORONARY ANGIOGRAPHY N/A 2/8/2023    Procedure: ANGIOGRAM, CORONARY ARTERY;  Surgeon: Flo Malone MD;  Location: [facility] CATH LAB;  Service: Cardiology;  Laterality: N/A;    HERNIA REPAIR      INCISION AND DRAINAGE Right 2/27/2025    Procedure: Incision and Drainage, thigh;  Surgeon: Kayla Foster MD;  Location: [facility] OR;  Service: General;  Laterality: Right;  Plan to go first if pt has cardiac clearance - SB    SKIN BIOPSY  03/2017    TONSILLECTOMY      UPPER GASTROINTESTINAL ENDOSCOPY  2011    EV    UPPER  GASTROINTESTINAL ENDOSCOPY  2016    VASECTOMY         Review of patient's allergies indicates:  No Known Allergies    No current facility-administered medications on file prior to encounter.     Current Outpatient Medications on File Prior to Encounter   Medication Sig    famotidine (PEPCID) 20 MG tablet TAKE 1 TABLET BY MOUTH EVERY OTHER DAY    furosemide (LASIX) 80 MG tablet Take 80 mg by mouth every Tuesday, Thursday, Saturday, .    lactulose (CHRONULAC) 20 gram/30 mL Soln Take 45 mLs (30 g total) by mouth 3 (three) times daily. TITRATE TO 3-4 BOWEL MOVEMENTS DAILY.    metoprolol tartrate (LOPRESSOR) 25 MG tablet TAKE 0.5 TABLETS BY MOUTH 2 TIMES DAILY.    midodrine (PROAMATINE) 5 MG Tab Take 3 tablets (15 mg total) by mouth every 8 (eight) hours.    potassium chloride (K-TAB) 20 mEq SMARTSI Tablet(s) By Mouth    tamsulosin (FLOMAX) 0.4 mg Cap Take by mouth.    tuberculin,purif.prot.deriv. (TUBERSOL IDRM) Inject 0.1 mLs into the skin.     Family History       Problem Relation (Age of Onset)    Cancer Maternal Uncle    Heart disease Maternal Uncle    Hyperlipidemia Brother    Ovarian cancer Sister          Tobacco Use    Smoking status: Never     Passive exposure: Never    Smokeless tobacco: Never   Substance and Sexual Activity    Alcohol use: Not Currently     Alcohol/week: 0.0 standard drinks of alcohol    Drug use: No    Sexual activity: Not Currently     Review of Systems   Constitutional:  Positive for fatigue. Negative for activity change, appetite change, chills and fever.   HENT:  Negative for ear pain, mouth sores, nosebleeds and sore throat.    Eyes:  Negative for visual disturbance.   Respiratory:  Positive for cough and wheezing. Negative for chest tightness, shortness of breath and stridor.    Cardiovascular:  Positive for leg swelling. Negative for chest pain and palpitations.   Gastrointestinal:  Negative for abdominal distention, abdominal pain, anal bleeding, blood in stool,  constipation, diarrhea, nausea, rectal pain and vomiting.   Endocrine: Negative for polyphagia.   Genitourinary:  Positive for difficulty urinating. Negative for dysuria, flank pain and frequency.   Musculoskeletal:  Positive for gait problem. Negative for myalgias.   Skin:  Positive for color change and rash.   Neurological:  Positive for weakness. Negative for dizziness, tremors, seizures, syncope, facial asymmetry, speech difficulty and headaches.   Hematological:  Negative for adenopathy.   Psychiatric/Behavioral:  Positive for confusion. Negative for agitation and hallucinations. The patient is not nervous/anxious.      Objective:     Vital Signs (Most Recent):  Temp: 97.8 °F (36.6 °C) (03/03/25 0416)  Pulse: 87 (03/03/25 0416)  Resp: 18 (03/03/25 0416)  BP: (!) 107/56 (03/03/25 0416)  SpO2: 96 % (03/03/25 0416) Vital Signs (24h Range):  Temp:  [97.7 °F (36.5 °C)-98.2 °F (36.8 °C)] 97.8 °F (36.6 °C)  Pulse:  [80-94] 87  Resp:  [18-20] 18  SpO2:  [96 %-100 %] 96 %  BP: (100-143)/(50-70) 107/56     Weight: 131.5 kg (290 lb)  Body mass index is 38.26 kg/m².     Physical Exam  Vitals and nursing note reviewed.   Constitutional:       General: He is not in acute distress.     Appearance: He is obese. He is not ill-appearing, toxic-appearing or diaphoretic.   HENT:      Head: Normocephalic and atraumatic.      Right Ear: External ear normal.      Left Ear: External ear normal.      Nose: Nose normal. No rhinorrhea.      Mouth/Throat:      Mouth: Mucous membranes are moist.      Pharynx: No oropharyngeal exudate or posterior oropharyngeal erythema.   Eyes:      General: Scleral icterus present.      Extraocular Movements: Extraocular movements intact.   Neck:      Vascular: No carotid bruit.   Cardiovascular:      Rate and Rhythm: Normal rate and regular rhythm.      Heart sounds: Normal heart sounds. No murmur heard.     No friction rub. No gallop.   Pulmonary:      Effort: Pulmonary effort is normal. No respiratory  distress.      Breath sounds: No stridor. Wheezing and rhonchi present. No rales.   Chest:      Chest wall: No tenderness.   Abdominal:      General: There is no distension.      Palpations: Abdomen is soft. There is no mass.      Tenderness: There is no abdominal tenderness. There is no right CVA tenderness, left CVA tenderness, guarding or rebound.      Hernia: No hernia is present.   Musculoskeletal:         General: No swelling, tenderness or deformity.      Cervical back: No rigidity.      Right lower leg: No edema.      Left lower leg: No edema.      Comments: Right thigh penrose in place with serous drainage. Counter incisions clean and dry without exudate.    Lymphadenopathy:      Cervical: No cervical adenopathy.   Skin:     General: Skin is warm and dry.      Capillary Refill: Capillary refill takes less than 2 seconds.      Coloration: Skin is jaundiced.      Findings: Erythema and rash present.             Comments: 12 x 10 cm area of erythema and fluctuance to medial right thigh s/p I&D this morning.  ACE wrap in place without drainage.   Neurological:      Mental Status: He is alert. Mental status is at baseline.      Cranial Nerves: No cranial nerve deficit.      Sensory: No sensory deficit.      Motor: Weakness present.      Coordination: Coordination normal.   Psychiatric:         Mood and Affect: Mood normal.         Behavior: Behavior normal.         Thought Content: Thought content normal.         Judgment: Judgment normal.                Significant Labs: All pertinent labs within the past 24 hours have been reviewed.  Recent Lab Results         03/03/25  0551        Albumin 1.9       ALP 68       ALT 16       Anion Gap 9       AST 55       Baso # 0.06       Basophil % 1.2       BILIRUBIN TOTAL 2.9  Comment: For infants and newborns, interpretation of results should be based  on gestational age, weight and in agreement with clinical  observations.    Premature Infant recommended reference  ranges:  Up to 24 hours.............<8.0 mg/dL  Up to 48 hours............<12.0 mg/dL  3-5 days..................<15.0 mg/dL  6-29 days.................<15.0 mg/dL         BUN 25       Calcium 8.0       Chloride 90       CO2 34       Creatinine 1.7       Differential Method Automated       eGFR 42.6       Eos # 0.2       Eos % 3.6       Glucose 88       Gran # (ANC) 2.8       Gran % 55.8       Hematocrit 28.7       Hemoglobin 9.5       Immature Grans (Abs) 0.05  Comment: Mild elevation in immature granulocytes is non specific and   can be seen in a variety of conditions including stress response,   acute inflammation, trauma and pregnancy. Correlation with other   laboratory and clinical findings is essential.         Immature Granulocytes 1.0       Lymph # 1.1       Lymph % 22.2       Magnesium  1.3       MCH 30.4       MCHC 33.1       MCV 92       Mono # 0.8       Mono % 16.2       MPV 12.4       nRBC 0       Platelet Count 90       Potassium 3.3       PROTEIN TOTAL 6.1       RBC 3.13       RDW 18.6       Sodium 133       WBC 4.95               Significant Imaging: I have reviewed all pertinent imaging results/findings within the past 24 hours.    Assessment and Plan     * Rhinovirus infection  Conservative care, + now with O2 needs.    2/24:  Continue conservative care.  Symptoms improving.  Patient with stable O2 sats on room air.    2/27:  Stable.  Continue conservative care.    3/2:  Resolved      Hypomagnesemia  Patient has Abnormal Magnesium: hypomagnesemia. Will continue to monitor electrolytes closely. Will replace the affected electrolytes and repeat labs to be done after interventions completed. The patient's magnesium results have been reviewed and are listed below.  Recent Labs   Lab 03/03/25  0551   MG 1.3*          Cellulitis of right lower extremity  2/23 FM:  Starting abx, get US.    2/24:  Increased induration and erythema noted.  Ultrasound pending.  Patient afebrile without leukocytosis.   Continue IV antibiotics.      2/25:  Ultrasound obtained yesterday reveals 12.6 x 6.1 x 3.0 cm complex fluid collection to patient's medial right thigh.  General surgery consulted.  Appreciate recs.  Patient afebrile without leukocytosis.  Continue current IV antibiotic treatment.    2/26:  Patient remains afebrile without leukocytosis.  Continue antibiotic treatment.  General surgery consulted.  Appreciate recs.    2/27:  S/P I&D per general surgery this morning.  Cultures pending.  Continue current IV abx treatment.    2/28:  Afebrile without leukocytosis.  Cultures pending.  Continue current IV antibiotic treatment.    3/1:  Patient afebrile without leukocytosis.  Cultures pending.  Continue current IV antibiotic treatment.  Pain controlled.    3/2:  POD #3 s/p I&D.  Pain controlled.  Patient remains afebrile without leukocytosis.  Cultures negative and pending.  Continue current plan.    3/3:  POD #4 s/p I&D right thigh. Pain controlled.  Patient afebrile without leukocytosis.  Wound cultures negative and pending.  Current IV antibiotics.  General surgery following.  Appreciate recs.  Continue current treatment plan.        Hypokalemia  Patient's most recent potassium results are listed below.   Recent Labs     03/01/25  0553 03/02/25  0545 03/03/25  0551   K 2.7* 2.6* 3.3*       Plan  - Replete potassium per protocol  - Monitor potassium Daily  - Patient's hypokalemia is stable  - Nephrology following.  Appreciate recs.    3/1:  Continued hypokalemia noted.  We will adjust p.o. potassium regimen.  Additional K riders given.  Continue monitoring with daily labs.    3/2:  A.m. labs pending.  We will review once resulted.  Plan to continue scheduled p.o. replacement with additional rider's if needed.    3/3:  Continued hypokalemia but overall improving.  Continue p.o. and IV replacement.  Monitor with daily labs.    Morbid obesity  Body mass index is 38.26 kg/m². Morbid obesity complicates all aspects of disease  management from diagnostic modalities to treatment. Weight loss encouraged and health benefits explained to patient.         Moderate protein-calorie malnutrition  Nutrition consulted. Most recent weight and BMI monitored-     Measurements:  Wt Readings from Last 1 Encounters:   02/21/25 131.5 kg (290 lb)   Body mass index is 38.26 kg/m².    Patient has been screened and assessed by RD.    Malnutrition Type:  Context:    Level:      Malnutrition Characteristic Summary:       Interventions/Recommendations (treatment strategy):         Hypotension  Blood pressure stable.  Continue monitoring.      Hepatic encephalopathy  Continue lactulose.      CKD (chronic kidney disease) stage 4, GFR 15-29 ml/min  Creatine stable for now. BMP reviewed- noted Estimated Creatinine Clearance: 56.7 mL/min (A) (based on SCr of 1.7 mg/dL (H)). according to latest data. Based on current GFR, CKD stage is stage 5 - GFR < 15.  Monitor UOP and serial BMP and adjust therapy as needed. Renally dose meds. Avoid nephrotoxic medications and procedures.    Per renal, currently on MWF HD, ? Still needs this?    Suspected Hepatorenal syndrome  Slightly hypervolemic today, much improved from past care.      Longstanding persistent atrial fibrillation  Patient has long standing persistent (>12 months) atrial fibrillation. Patient is currently in atrial fibrillation. [id number] Score: 1. The patients heart rate in the last 24 hours is as follows:  Pulse  Min: 80  Max: 94     Antiarrhythmics  metoprolol tartrate (LOPRESSOR) tablet 25 mg, 2 times daily, Oral    Anticoagulants       Plan  - Replete lytes with a goal of K>4, Mg >2  - Patient is anticoagulated, see medications listed above.  - Patient's afib is currently controlled    Auto anticoagulated with CLD.        Coagulopathy  CLD      Cirrhosis  Patient with known Cirrhosis with Child's class C. Co-morbidities are present and inclusive of portal hypertension, hepatic encephalopathy, malnutrition,  anemia/pancytopenia, and anticoagulation.  MELD-Na score calculated; MELD 3.0: 28 at 2/23/2025  5:15 AM  MELD-Na: 26 at 2/23/2025  5:15 AM  Calculated from:  Serum Creatinine: 1.8 mg/dL at 2/23/2025  5:15 AM  Serum Sodium: 133 mmol/L at 2/23/2025  5:15 AM  Total Bilirubin: 3.9 mg/dL at 2/23/2025  5:15 AM  Serum Albumin: 2.1 g/dL at 2/23/2025  5:15 AM  INR(ratio): 1.9 at 2/21/2025  6:42 PM  Age at listing (hypothetical): 70 years  Sex: Male at 2/23/2025  5:15 AM      Continue chronic meds. Etiology likely ETOH. Will avoid any hepatotoxic meds, and monitor CBC/CMP/INR for synthetic function.       VTE Risk Mitigation (From admission, onward)           Ordered     Place sequential compression device  Until discontinued         02/22/25 1002     IP VTE HIGH RISK PATIENT  Once         02/21/25 2204     Place ANDREW hose  Until discontinued         02/21/25 2204                    Discharge Planning   AUTUMN:      Code Status: Full Code   Medical Readiness for Discharge Date:   Discharge Plan A: Home   Discharge Delays: None known at this time            Please place Justification for DME        Pilar Gates NP  Department of Hospital Medicine   [facility]